# Patient Record
Sex: MALE | Race: WHITE | NOT HISPANIC OR LATINO | Employment: FULL TIME | URBAN - METROPOLITAN AREA
[De-identification: names, ages, dates, MRNs, and addresses within clinical notes are randomized per-mention and may not be internally consistent; named-entity substitution may affect disease eponyms.]

---

## 2017-11-08 ENCOUNTER — OFFICE VISIT (OUTPATIENT)
Dept: URGENT CARE | Facility: CLINIC | Age: 37
End: 2017-11-08
Payer: COMMERCIAL

## 2017-11-08 VITALS
TEMPERATURE: 98 F | OXYGEN SATURATION: 98 % | WEIGHT: 300 LBS | HEIGHT: 72 IN | BODY MASS INDEX: 40.63 KG/M2 | HEART RATE: 109 BPM | DIASTOLIC BLOOD PRESSURE: 80 MMHG | SYSTOLIC BLOOD PRESSURE: 136 MMHG

## 2017-11-08 DIAGNOSIS — K52.9 GASTROENTERITIS: Primary | ICD-10-CM

## 2017-11-08 DIAGNOSIS — B34.9 VIRAL SYNDROME: ICD-10-CM

## 2017-11-08 PROCEDURE — 99213 OFFICE O/P EST LOW 20 MIN: CPT | Mod: 25,S$GLB,, | Performed by: NURSE PRACTITIONER

## 2017-11-08 PROCEDURE — 96372 THER/PROPH/DIAG INJ SC/IM: CPT | Mod: S$GLB,,, | Performed by: EMERGENCY MEDICINE

## 2017-11-08 RX ORDER — DICYCLOMINE HYDROCHLORIDE 20 MG/1
20 TABLET ORAL
Status: COMPLETED | OUTPATIENT
Start: 2017-11-08 | End: 2017-11-08

## 2017-11-08 RX ORDER — INSULIN LISPRO 100 [IU]/ML
INJECTION, SOLUTION INTRAVENOUS; SUBCUTANEOUS
COMMUNITY
End: 2017-11-09

## 2017-11-08 RX ORDER — ONDANSETRON 2 MG/ML
8 INJECTION INTRAMUSCULAR; INTRAVENOUS
Status: COMPLETED | OUTPATIENT
Start: 2017-11-08 | End: 2017-11-08

## 2017-11-08 RX ORDER — ONDANSETRON 8 MG/1
8 TABLET, ORALLY DISINTEGRATING ORAL
Status: DISCONTINUED | OUTPATIENT
Start: 2017-11-08 | End: 2017-11-08

## 2017-11-08 RX ORDER — ONDANSETRON 4 MG/1
4 TABLET, ORALLY DISINTEGRATING ORAL EVERY 6 HOURS PRN
Qty: 12 TABLET | Refills: 0 | Status: SHIPPED | OUTPATIENT
Start: 2017-11-08 | End: 2017-11-11

## 2017-11-08 RX ORDER — DICYCLOMINE HYDROCHLORIDE 20 MG/1
20 TABLET ORAL 3 TIMES DAILY PRN
Qty: 12 TABLET | Refills: 0 | Status: SHIPPED | OUTPATIENT
Start: 2017-11-08 | End: 2018-09-29

## 2017-11-08 RX ADMIN — DICYCLOMINE HYDROCHLORIDE 20 MG: 20 TABLET ORAL at 08:11

## 2017-11-08 RX ADMIN — ONDANSETRON 8 MG: 2 INJECTION INTRAMUSCULAR; INTRAVENOUS at 08:11

## 2017-11-09 ENCOUNTER — HOSPITAL ENCOUNTER (EMERGENCY)
Facility: HOSPITAL | Age: 37
Discharge: HOME | End: 2017-11-09
Attending: EMERGENCY MEDICINE
Payer: COMMERCIAL

## 2017-11-09 VITALS
OXYGEN SATURATION: 99 % | TEMPERATURE: 98 F | SYSTOLIC BLOOD PRESSURE: 134 MMHG | HEART RATE: 72 BPM | DIASTOLIC BLOOD PRESSURE: 73 MMHG | BODY MASS INDEX: 40.63 KG/M2 | HEIGHT: 72 IN | RESPIRATION RATE: 18 BRPM | WEIGHT: 300 LBS

## 2017-11-09 DIAGNOSIS — Z79.4 ENCOUNTER FOR LONG-TERM (CURRENT) USE OF INSULIN: ICD-10-CM

## 2017-11-09 DIAGNOSIS — Z96.41 INSULIN PUMP STATUS: ICD-10-CM

## 2017-11-09 DIAGNOSIS — R10.13 EPIGASTRIC PAIN: Primary | ICD-10-CM

## 2017-11-09 DIAGNOSIS — E10.8 TYPE I DIABETES MELLITUS WITH MANIFESTATIONS: ICD-10-CM

## 2017-11-09 LAB
ALBUMIN SERPL BCP-MCNC: 3.3 G/DL
ALLENS TEST: ABNORMAL
ALP SERPL-CCNC: 103 U/L
ALT SERPL W/O P-5'-P-CCNC: 12 U/L
ANION GAP SERPL CALC-SCNC: 12 MMOL/L
AST SERPL-CCNC: 14 U/L
BACTERIA #/AREA URNS AUTO: ABNORMAL /HPF
BASOPHILS # BLD AUTO: 0.03 K/UL
BASOPHILS NFR BLD: 0.3 %
BILIRUB SERPL-MCNC: 0.8 MG/DL
BILIRUB UR QL STRIP: NEGATIVE
BUN SERPL-MCNC: 27 MG/DL
CALCIUM SERPL-MCNC: 9.2 MG/DL
CHLORIDE SERPL-SCNC: 98 MMOL/L
CLARITY UR REFRACT.AUTO: CLEAR
CO2 SERPL-SCNC: 21 MMOL/L
COLOR UR AUTO: YELLOW
CREAT SERPL-MCNC: 1.2 MG/DL
DIFFERENTIAL METHOD: ABNORMAL
EOSINOPHIL # BLD AUTO: 0 K/UL
EOSINOPHIL NFR BLD: 0.4 %
ERYTHROCYTE [DISTWIDTH] IN BLOOD BY AUTOMATED COUNT: 12.8 %
EST. GFR  (AFRICAN AMERICAN): >60 ML/MIN/1.73 M^2
EST. GFR  (NON AFRICAN AMERICAN): >60 ML/MIN/1.73 M^2
GLUCOSE SERPL-MCNC: 286 MG/DL
GLUCOSE UR QL STRIP: ABNORMAL
HCO3 UR-SCNC: 21.9 MMOL/L (ref 24–28)
HCT VFR BLD AUTO: 45.6 %
HGB BLD-MCNC: 15.5 G/DL
HGB UR QL STRIP: NEGATIVE
HYALINE CASTS UR QL AUTO: 2 /LPF
IMM GRANULOCYTES # BLD AUTO: 0.04 K/UL
IMM GRANULOCYTES NFR BLD AUTO: 0.4 %
KETONES UR QL STRIP: ABNORMAL
LEUKOCYTE ESTERASE UR QL STRIP: NEGATIVE
LIPASE SERPL-CCNC: 5 U/L
LYMPHOCYTES # BLD AUTO: 0.9 K/UL
LYMPHOCYTES NFR BLD: 8.4 %
MCH RBC QN AUTO: 26.7 PG
MCHC RBC AUTO-ENTMCNC: 34 G/DL
MCV RBC AUTO: 79 FL
MICROSCOPIC COMMENT: ABNORMAL
MONOCYTES # BLD AUTO: 1 K/UL
MONOCYTES NFR BLD: 9.2 %
NEUTROPHILS # BLD AUTO: 8.6 K/UL
NEUTROPHILS NFR BLD: 81.3 %
NITRITE UR QL STRIP: NEGATIVE
NRBC BLD-RTO: 0 /100 WBC
PCO2 BLDA: 39 MMHG (ref 35–45)
PH SMN: 7.36 [PH] (ref 7.35–7.45)
PH UR STRIP: 6 [PH] (ref 5–8)
PLATELET # BLD AUTO: 295 K/UL
PMV BLD AUTO: 8.9 FL
PO2 BLDA: 21 MMHG (ref 40–60)
POC BE: -4 MMOL/L
POC SATURATED O2: 32 % (ref 95–100)
POC TCO2: 23 MMOL/L (ref 24–29)
POCT GLUCOSE: 231 MG/DL (ref 70–110)
POCT GLUCOSE: 274 MG/DL (ref 70–110)
POTASSIUM SERPL-SCNC: 4.1 MMOL/L
PROT SERPL-MCNC: 7.2 G/DL
PROT UR QL STRIP: NEGATIVE
RBC # BLD AUTO: 5.81 M/UL
RBC #/AREA URNS AUTO: 0 /HPF (ref 0–4)
SAMPLE: ABNORMAL
SITE: ABNORMAL
SODIUM SERPL-SCNC: 131 MMOL/L
SP GR UR STRIP: 1.03 (ref 1–1.03)
SQUAMOUS #/AREA URNS AUTO: 1 /HPF
URN SPEC COLLECT METH UR: ABNORMAL
UROBILINOGEN UR STRIP-ACNC: NEGATIVE EU/DL
WBC # BLD AUTO: 10.59 K/UL
WBC #/AREA URNS AUTO: 1 /HPF (ref 0–5)
YEAST UR QL AUTO: ABNORMAL

## 2017-11-09 PROCEDURE — 99284 EMERGENCY DEPT VISIT MOD MDM: CPT | Mod: ,,,

## 2017-11-09 PROCEDURE — 80053 COMPREHEN METABOLIC PANEL: CPT

## 2017-11-09 PROCEDURE — 99284 EMERGENCY DEPT VISIT MOD MDM: CPT | Mod: 25

## 2017-11-09 PROCEDURE — 63600175 PHARM REV CODE 636 W HCPCS

## 2017-11-09 PROCEDURE — 25000003 PHARM REV CODE 250

## 2017-11-09 PROCEDURE — 82803 BLOOD GASES ANY COMBINATION: CPT

## 2017-11-09 PROCEDURE — 85025 COMPLETE CBC W/AUTO DIFF WBC: CPT

## 2017-11-09 PROCEDURE — 83690 ASSAY OF LIPASE: CPT

## 2017-11-09 PROCEDURE — 96361 HYDRATE IV INFUSION ADD-ON: CPT

## 2017-11-09 PROCEDURE — 96375 TX/PRO/DX INJ NEW DRUG ADDON: CPT

## 2017-11-09 PROCEDURE — 96372 THER/PROPH/DIAG INJ SC/IM: CPT

## 2017-11-09 PROCEDURE — S0028 INJECTION, FAMOTIDINE, 20 MG: HCPCS

## 2017-11-09 PROCEDURE — 82962 GLUCOSE BLOOD TEST: CPT

## 2017-11-09 PROCEDURE — 96374 THER/PROPH/DIAG INJ IV PUSH: CPT

## 2017-11-09 PROCEDURE — 81001 URINALYSIS AUTO W/SCOPE: CPT

## 2017-11-09 RX ORDER — PROMETHAZINE HYDROCHLORIDE 25 MG/1
25 SUPPOSITORY RECTAL EVERY 6 HOURS PRN
Qty: 12 SUPPOSITORY | Refills: 0 | Status: ON HOLD | OUTPATIENT
Start: 2017-11-09 | End: 2018-10-01

## 2017-11-09 RX ORDER — DICYCLOMINE HYDROCHLORIDE 10 MG/ML
20 INJECTION INTRAMUSCULAR
Status: COMPLETED | OUTPATIENT
Start: 2017-11-09 | End: 2017-11-09

## 2017-11-09 RX ORDER — FAMOTIDINE 10 MG/ML
20 INJECTION INTRAVENOUS
Status: COMPLETED | OUTPATIENT
Start: 2017-11-09 | End: 2017-11-09

## 2017-11-09 RX ORDER — PANTOPRAZOLE SODIUM 20 MG/1
20 TABLET, DELAYED RELEASE ORAL DAILY
Qty: 30 TABLET | Refills: 0 | OUTPATIENT
Start: 2017-11-09 | End: 2018-09-29

## 2017-11-09 RX ORDER — ONDANSETRON 2 MG/ML
4 INJECTION INTRAMUSCULAR; INTRAVENOUS
Status: COMPLETED | OUTPATIENT
Start: 2017-11-09 | End: 2017-11-09

## 2017-11-09 RX ADMIN — DICYCLOMINE HYDROCHLORIDE 20 MG: 20 INJECTION, SOLUTION INTRAMUSCULAR at 08:11

## 2017-11-09 RX ADMIN — SODIUM CHLORIDE 1000 ML: 0.9 INJECTION, SOLUTION INTRAVENOUS at 09:11

## 2017-11-09 RX ADMIN — FAMOTIDINE 20 MG: 10 INJECTION, SOLUTION INTRAVENOUS at 08:11

## 2017-11-09 RX ADMIN — ONDANSETRON 4 MG: 2 INJECTION INTRAMUSCULAR; INTRAVENOUS at 08:11

## 2017-11-09 RX ADMIN — ALUMINUM HYDROXIDE, MAGNESIUM HYDROXIDE, AND SIMETHICONE 50 ML: 200; 200; 20 SUSPENSION ORAL at 09:11

## 2017-11-09 NOTE — ED PROVIDER NOTES
Encounter Date: 11/9/2017       History     Chief Complaint   Patient presents with    Emesis     stomach bug since monday, nvd, all went around my house, diabetic and still vomiting inspite of zofran , on insuline pump     37 y.o. male with medical history of DM type 1 on insulin pump presents to ED with abdominal pain. Patient endorses nausea, vomiting, subjective fever. Symptoms have been present since Monday. Patient states his entire household has similar symptoms. Patient denies hematemesis, blood in stool. Patient seen at [facility] last night and prescribed zofran with minimal relief. Patient has had minimal solid and liquid intake since Monday and reports glucose readings have been elevated. Also reports history of gastric ulcers in the past that feel similar. Patient denies chills, chest pain, shortness of breath, changes in urination, weakness, syncope.          Review of patient's allergies indicates:   Allergen Reactions    Codeine Nausea And Vomiting     Past Medical History:   Diagnosis Date    Diabetes mellitus type I      History reviewed. No pertinent surgical history.  History reviewed. No pertinent family history.  Social History   Substance Use Topics    Smoking status: Never Smoker    Smokeless tobacco: Never Used    Alcohol use Yes      Comment: occas, none recently     Review of Systems   Constitutional: Positive for fatigue. Negative for chills, diaphoresis and fever (subjective).   HENT: Negative for congestion and nosebleeds.    Eyes: Negative for visual disturbance.   Respiratory: Negative for cough and shortness of breath.    Cardiovascular: Negative for chest pain and leg swelling.   Gastrointestinal: Positive for abdominal pain, diarrhea, nausea and vomiting. Negative for abdominal distention and blood in stool.   Genitourinary: Negative for dysuria and hematuria.   Musculoskeletal: Negative for back pain and neck pain.   Skin: Negative for rash.   Neurological: Negative  for syncope, weakness, light-headedness and headaches.   Psychiatric/Behavioral: The patient is not nervous/anxious.        Physical Exam     Initial Vitals [11/09/17 0749]   BP Pulse Resp Temp SpO2   (!) 142/74 93 18 98.2 °F (36.8 °C) 97 %      MAP       96.67         Physical Exam    Vitals reviewed.  Constitutional: He appears well-developed and well-nourished. He is not diaphoretic. No distress.   HENT:   Head: Normocephalic and atraumatic.   Nose: Nose normal.   Mouth/Throat: Oropharynx is clear and moist. No oropharyngeal exudate.   Eyes: Conjunctivae and EOM are normal. Pupils are equal, round, and reactive to light.   Neck: Normal range of motion. Neck supple. No thyromegaly present.   Cardiovascular: Normal rate, regular rhythm and intact distal pulses.   Pulmonary/Chest: Breath sounds normal. No respiratory distress. He has no wheezes. He exhibits no tenderness.   Abdominal: Soft. He exhibits no distension. Bowel sounds are increased. There is tenderness in the epigastric area. There is guarding. There is no rigidity, no rebound, no CVA tenderness, no tenderness at McBurney's point and negative Solis's sign.   Musculoskeletal: Normal range of motion.   Lymphadenopathy:     He has no cervical adenopathy.   Neurological: He is alert and oriented to person, place, and time. He has normal strength. No cranial nerve deficit or sensory deficit.   Skin: Skin is warm and dry. Capillary refill takes less than 2 seconds. No rash noted.   Psychiatric: He has a normal mood and affect.         ED Course   Procedures  Labs Reviewed   CBC W/ AUTO DIFFERENTIAL - Abnormal; Notable for the following:        Result Value    MCV 79 (*)     MCH 26.7 (*)     MPV 8.9 (*)     Gran # 8.6 (*)     Lymph # 0.9 (*)     Gran% 81.3 (*)     Lymph% 8.4 (*)     All other components within normal limits   COMPREHENSIVE METABOLIC PANEL - Abnormal; Notable for the following:     Sodium 131 (*)     CO2 21 (*)     Glucose 286 (*)     BUN, Bld  27 (*)     Albumin 3.3 (*)     All other components within normal limits   URINALYSIS, REFLEX TO URINE CULTURE - Abnormal; Notable for the following:     Glucose, UA 3+ (*)     Ketones, UA 3+ (*)     All other components within normal limits    Narrative:     Preferred Collection Type->Urine, Clean Catch   URINALYSIS MICROSCOPIC - Abnormal; Notable for the following:     Hyaline Casts, UA 2 (*)     All other components within normal limits    Narrative:     Preferred Collection Type->Urine, Clean Catch   POCT GLUCOSE - Abnormal; Notable for the following:     POCT Glucose 274 (*)     All other components within normal limits   ISTAT PROCEDURE - Abnormal; Notable for the following:     POC PO2 21 (*)     POC HCO3 21.9 (*)     POC SATURATED O2 32 (*)     POC TCO2 23 (*)     All other components within normal limits   POCT GLUCOSE - Abnormal; Notable for the following:     POCT Glucose 231 (*)     All other components within normal limits   LIPASE             Medical Decision Making:   History:   Old Medical Records: I decided to obtain old medical records.  Old Records Summarized: records from clinic visits.  Clinical Tests:   Lab Tests: Ordered and Reviewed       APC / Resident Notes:   37 y.o. male with medical history of DM type 1 on insulin pump presents to ED with abdominal pain.     DDX includes but is not limited to gastroenteritis, pancreatitis, hyperglycemia, dehydration, GERMAN, electrolyte abnormality. Will get labs and give IVF, IV zofran 4mg, IV pepcid 20mg, IM bentyl 20mg and reassess. Patient reports minimal relief after pepcid. Patient tender in epigastric region, no RUQ or RLQ tenderness. BUN 27, sodium 131, glucose 286. All other labs benign. After IVF and GI cocktail, patient states pain is much improved. POCT 231.patient tolerated po challenge. Most likely gastroenteritis, considered gastric ulcer.  Discharged to home in stable condition, return to ED warnings given, follow up and patient care  instructions given.  Will discharge home with phenergan suppository and protonix 20mg with GI follow up.    I have discussed and reviewed with my supervising physician.              ED Course      Clinical Impression:   The encounter diagnosis was Epigastric pain.    Disposition:   Disposition: Discharged  Condition: Stable                        Shae Patterson PA-C  11/09/17 1442

## 2017-11-09 NOTE — ED TRIAGE NOTES
Patient states vomiting diarrhea since Monday, Today emesis x1, Went to Urgent Care yesterday, shot and rx Zofran that has not helped. On insulin pump with basal rate.

## 2017-11-09 NOTE — ED NOTES
Patient identifiers verified and correct for Mr Waters  C/C: Vomiting  APPEARANCE: awake and alert in NAD.  SKIN: warm, dry and intact. No breakdown or bruising.Right flank insulin pump  MUSCULOSKELETAL: Patient moving all extremities spontaneously, no obvious swelling or deformities noted. Ambulates independently.  RESPIRATORY: Denies shortness of breath.Respirations unlabored.   CARDIAC: Denies CP, 2+ distal pulses; no peripheral edema  ABDOMEN:Abdomen soft tender upper abdomen, positive vomiting and diarrhea.   : voids spontaneously, denies difficulty  Neurologic: AAO x 4; follows commands equal strength in all extremities; denies numbness/tingling. Denies dizziness

## 2017-11-09 NOTE — PROGRESS NOTES
Subjective:       Patient ID: Clay Waters is a 37 y.o. male.    Vitals:  height is 6' (1.829 m) and weight is 136.1 kg (300 lb). His oral temperature is 97.9 °F (36.6 °C). His blood pressure is 136/80 and his pulse is 109. His oxygen saturation is 98%.     Chief Complaint: Vomiting    Emesis    This is a new problem. The current episode started in the past 7 days. The problem occurs 2 to 4 times per day. The problem has been waxing and waning. The emesis has an appearance of stomach contents and bile. There has been no fever. The fever has been present for 1 to 2 days. Associated symptoms include diarrhea and headaches. Pertinent negatives include no abdominal pain, chest pain, chills, coughing, dizziness, fever or myalgias. Risk factors include ill contacts (family member with same symptoms). He has tried increased fluids, sleep and acetaminophen for the symptoms. The treatment provided mild relief.     Review of Systems   Constitution: Positive for malaise/fatigue. Negative for chills and fever.   HENT: Negative for congestion, ear pain, hoarse voice and sore throat.    Eyes: Negative for discharge and redness.   Cardiovascular: Negative for chest pain, dyspnea on exertion and leg swelling.   Respiratory: Negative for cough, shortness of breath, sputum production and wheezing.    Musculoskeletal: Negative for myalgias.   Gastrointestinal: Positive for diarrhea, nausea and vomiting. Negative for abdominal pain.   Neurological: Positive for headaches. Negative for dizziness.       Objective:      Physical Exam   Constitutional: He is oriented to person, place, and time. He appears well-developed and well-nourished.   HENT:   Head: Normocephalic and atraumatic.   Right Ear: Hearing, tympanic membrane, external ear and ear canal normal. Tympanic membrane is not erythematous.   Left Ear: Hearing, tympanic membrane, external ear and ear canal normal. Tympanic membrane is not erythematous.   Nose: Nose normal.  No mucosal edema or rhinorrhea. Right sinus exhibits no maxillary sinus tenderness and no frontal sinus tenderness. Left sinus exhibits no maxillary sinus tenderness and no frontal sinus tenderness.   Mouth/Throat: Uvula is midline, oropharynx is clear and moist and mucous membranes are normal. No posterior oropharyngeal edema or posterior oropharyngeal erythema.   Eyes: Conjunctivae, EOM and lids are normal. Right conjunctiva is not injected. Left conjunctiva is not injected.   Neck: Normal range of motion and full passive range of motion without pain. Neck supple.   Cardiovascular: Regular rhythm, S1 normal, S2 normal, normal heart sounds and normal pulses.  Tachycardia present.    Pulmonary/Chest: Effort normal and breath sounds normal. No respiratory distress. He has no decreased breath sounds. He has no wheezes.   Abdominal: Soft. Normal appearance. Bowel sounds are increased. There is no tenderness.   Neurological: He is alert and oriented to person, place, and time.   Skin: Skin is warm and dry.   Nursing note and vitals reviewed.      Assessment:       1. Gastroenteritis    2. Viral syndrome        Plan:         Gastroenteritis    Viral syndrome    Other orders  -     dicyclomine tablet 20 mg; Take 1 tablet (20 mg total) by mouth one time.  -     Discontinue: ondansetron disintegrating tablet 8 mg; Take 1 tablet (8 mg total) by mouth one time.  -     ondansetron (ZOFRAN-ODT) 4 MG TbDL; Take 1 tablet (4 mg total) by mouth every 6 (six) hours as needed (nausea vomiting).  Dispense: 12 tablet; Refill: 0  -     dicyclomine (BENTYL) 20 mg tablet; Take 1 tablet (20 mg total) by mouth 3 (three) times daily as needed.  Dispense: 12 tablet; Refill: 0  -     ondansetron injection 8 mg; Inject 8 mg into the vein one time.        accucheck unavailable in clinic, patient instructed to go to ER if >400 or unable to tolerate fluids

## 2017-11-09 NOTE — PATIENT INSTRUCTIONS
Please arrange follow up with your primary medical clinic as soon as possible. You must understand that you've received an Urgent Care treatment only and that you may be released before all of your medical problems are known or treated. You, the patient, will arrange for follow up as instructed. If your symptoms worsen or fail to improve you should go to the Emergency Room.      Noninfectious Gastroenteritis (Ages 6 Years to Adult)    Gastroenteritis can cause nausea, vomiting, diarrhea, and abdominal cramping. This may occur as a result of food sensitivity, inflammation of your gastrointestinal tract, medicines, stress, or other causes not related to infection. Your symptoms will usually last from 1 to 3 days, but can last longer. Antibiotics are not effective, but simple home treatment will be helpful.  Home care  Medicine  · You may use acetaminophen or NSAID medicines like ibuprofen or naproxen to control fever, unless another medicine is prescribed. (Note: If you have chronic liver or kidney disease, or ever had a stomach ulcer or gastrointestinalI bleeding, talk with your healthcare provider before using these medicines.) Aspirin should never be used in anyone under 18 years of age who is ill with a fever. It may cause severe liver damage. Don't increase your NSAID medicines if you are already taking these medicines for another condition (like arthritis). Don't use NSAIDS if you are on aspirin (such as for heart disease, or after a stroke).  · If medicines for diarrhea or vomiting are prescribed, take only as directed.  General care and preventing spread of the illness  · If symptoms are severe, rest at home for the next 24 hours or until you feel better.  · Hand washing with soap and water is the best way to prevent the spread of infection. Wash your hands after touching anyone who is sick.  · Wash your hands after using the toilet and before meals. Clean the toilet after each use.  · Caffeine, tobacco, and  alcohol can make your diarrhea, cramping, and pain worse.  Diet  · Water and clear liquids are important so you do not get dehydrated. Drink a small amount at a time.  · Do not force yourself to eat, especially if you have cramps, vomiting, or diarrhea. When you finally decide to start eating, do not eat large amounts at a time, even if you are hungry.  · If you eat, avoid fatty, greasy, spicy, or fried foods.  · Do not eat dairy products if you have diarrhea; they can make the diarrhea worse.  During the first 24 hours (the first full day), follow the diet below:  · Beverages: Water, clear liquids, soft drinks without caffeine, like ginger ale; mineral water (plain or flavored); decaffeinated tea and coffee.  · Soups: Clear broth, consommé, and bouillon Sports drinks aren't a good choice because they have too much sugar and not enough electrolytes. In this case, commercially available products called oral rehydration solutions are best.  · Desserts: Plain gelatin, popsicles, and fruit juice bars.  During the next 24 hours (the second day), you may add the following to the above if you have improved. If not, continue what you did the first day:  · Hot cereal, plain toast, bread, rolls, crackers  · Plain noodles, rice, mashed potatoes, chicken noodle or rice soup  · Unsweetened canned fruit (avoid pineapple), bananas  · Limit caffeine and chocolate. No spices or seasonings except salt.  During the next 24 hours  · Gradually resume a normal diet, as you feel better and your symptoms improve.  · If at any time your symptoms start getting worse, go back to clear liquids until you feel better.  Food preparation  · If you have diarrhea, you should not prepare food for others. When you  prepare food for yourself, wash your hands before and after.  · Wash your hands after using cutting boards, countertops, and knives that have been in contact with raw food.  · Keep uncooked meats away from cooked and ready-to-eat  foods.  Follow-up care  Follow up with your healthcare provider if you are not improving over the next 2 to 3 days, or as advised. If a stool (diarrhea) sample was taken, call for the results as directed.  When to seek medical care  Call your healthcare provider right away if any of these occur:   · Increasing abdominal pain or constant lower right abdominal pain  · Continued vomiting (unable to keep liquids down)  · Frequent diarrhea (more than 5 times a day)  · Blood in vomit or stool (black or red color)  · Inability to tolerate solid food after a few days.  · Dark urine, reduced urine output  · Weakness, dizziness  · Drowsiness  · Fever of 100.4ºF (38.0ºC) or higher, or as directed by your healthcare provider  · New rash  Call 911  Call 911 if any of these occur:  · Trouble breathing  · Chest pain  · Confusion  · Severe drowsiness or trouble awakening  · Seizure  · Stiff neck  Date Last Reviewed: 11/16/2015  © 4926-8294 [employer]. [address] [place] [address], [place] [address]. All rights reserved. This information is not intended as a substitute for professional medical care. Always follow your healthcare professional's instructions.

## 2018-09-29 PROCEDURE — 0H94XZZ DRAINAGE OF NECK SKIN, EXTERNAL APPROACH: ICD-10-PCS | Performed by: EMERGENCY MEDICINE

## 2018-09-30 ENCOUNTER — HOSPITAL ENCOUNTER (INPATIENT)
Facility: HOSPITAL | Age: 38
LOS: 2 days | Discharge: HOME | DRG: 603 | End: 2018-10-03
Attending: EMERGENCY MEDICINE | Admitting: HOSPITALIST
Payer: COMMERCIAL

## 2018-09-30 DIAGNOSIS — E10.65 TYPE 1 DIABETES MELLITUS WITH HYPERGLYCEMIA: ICD-10-CM

## 2018-09-30 DIAGNOSIS — L03.221 CELLULITIS OF NECK: Primary | ICD-10-CM

## 2018-09-30 DIAGNOSIS — I10 ESSENTIAL HYPERTENSION: ICD-10-CM

## 2018-09-30 DIAGNOSIS — Z96.41 INSULIN PUMP IN PLACE: ICD-10-CM

## 2018-09-30 DIAGNOSIS — L03.90 CELLULITIS: ICD-10-CM

## 2018-09-30 DIAGNOSIS — R91.8 LUNG NODULE, MULTIPLE: ICD-10-CM

## 2018-09-30 LAB
ALLENS TEST: ABNORMAL
ANION GAP SERPL CALC-SCNC: 15 MMOL/L
B-OH-BUTYR BLD STRIP-SCNC: 1.4 MMOL/L
BASOPHILS # BLD AUTO: 0.12 K/UL
BASOPHILS NFR BLD: 1.1 %
BUN SERPL-MCNC: 20 MG/DL
CALCIUM SERPL-MCNC: 9.3 MG/DL
CHLORIDE SERPL-SCNC: 100 MMOL/L
CO2 SERPL-SCNC: 17 MMOL/L
CREAT SERPL-MCNC: 1.2 MG/DL
DELSYS: ABNORMAL
DIFFERENTIAL METHOD: ABNORMAL
EOSINOPHIL # BLD AUTO: 0.3 K/UL
EOSINOPHIL NFR BLD: 2.6 %
ERYTHROCYTE [DISTWIDTH] IN BLOOD BY AUTOMATED COUNT: 11.9 %
EST. GFR  (AFRICAN AMERICAN): >60 ML/MIN/1.73 M^2
EST. GFR  (NON AFRICAN AMERICAN): >60 ML/MIN/1.73 M^2
GLUCOSE SERPL-MCNC: 571 MG/DL
HCO3 UR-SCNC: 25 MMOL/L (ref 24–28)
HCT VFR BLD AUTO: 45.5 %
HGB BLD-MCNC: 14.9 G/DL
IMM GRANULOCYTES # BLD AUTO: 0.04 K/UL
IMM GRANULOCYTES NFR BLD AUTO: 0.4 %
INR PPP: 1
LDH SERPL L TO P-CCNC: 0.81 MMOL/L (ref 0.5–2.2)
LYMPHOCYTES # BLD AUTO: 1.9 K/UL
LYMPHOCYTES NFR BLD: 17.2 %
MCH RBC QN AUTO: 26.9 PG
MCHC RBC AUTO-ENTMCNC: 32.7 G/DL
MCV RBC AUTO: 82 FL
MODE: ABNORMAL
MONOCYTES # BLD AUTO: 0.9 K/UL
MONOCYTES NFR BLD: 8.1 %
NEUTROPHILS # BLD AUTO: 7.7 K/UL
NEUTROPHILS NFR BLD: 70.6 %
NRBC BLD-RTO: 0 /100 WBC
PCO2 BLDA: 42.3 MMHG (ref 35–45)
PH SMN: 7.38 [PH] (ref 7.35–7.45)
PLATELET # BLD AUTO: 295 K/UL
PMV BLD AUTO: 9.4 FL
PO2 BLDA: 34 MMHG (ref 40–60)
POC BE: 0 MMOL/L
POC SATURATED O2: 64 % (ref 95–100)
POC TCO2: 26 MMOL/L (ref 24–29)
POCT GLUCOSE: 482 MG/DL (ref 70–110)
POCT GLUCOSE: 493 MG/DL (ref 70–110)
POTASSIUM SERPL-SCNC: 4.4 MMOL/L
PROTHROMBIN TIME: 10.2 SEC
RBC # BLD AUTO: 5.54 M/UL
SAMPLE: ABNORMAL
SITE: ABNORMAL
SODIUM SERPL-SCNC: 132 MMOL/L
WBC # BLD AUTO: 10.91 K/UL

## 2018-09-30 PROCEDURE — 96365 THER/PROPH/DIAG IV INF INIT: CPT

## 2018-09-30 PROCEDURE — 85610 PROTHROMBIN TIME: CPT

## 2018-09-30 PROCEDURE — S0077 INJECTION, CLINDAMYCIN PHOSP: HCPCS | Performed by: EMERGENCY MEDICINE

## 2018-09-30 PROCEDURE — 99285 EMERGENCY DEPT VISIT HI MDM: CPT | Mod: 25

## 2018-09-30 PROCEDURE — 99284 EMERGENCY DEPT VISIT MOD MDM: CPT | Mod: ,,, | Performed by: EMERGENCY MEDICINE

## 2018-09-30 PROCEDURE — 99900035 HC TECH TIME PER 15 MIN (STAT)

## 2018-09-30 PROCEDURE — 96375 TX/PRO/DX INJ NEW DRUG ADDON: CPT

## 2018-09-30 PROCEDURE — 82803 BLOOD GASES ANY COMBINATION: CPT

## 2018-09-30 PROCEDURE — 87040 BLOOD CULTURE FOR BACTERIA: CPT

## 2018-09-30 PROCEDURE — 80048 BASIC METABOLIC PNL TOTAL CA: CPT

## 2018-09-30 PROCEDURE — G0378 HOSPITAL OBSERVATION PER HR: HCPCS

## 2018-09-30 PROCEDURE — 25500020 PHARM REV CODE 255: Performed by: EMERGENCY MEDICINE

## 2018-09-30 PROCEDURE — 96376 TX/PRO/DX INJ SAME DRUG ADON: CPT

## 2018-09-30 PROCEDURE — 83036 HEMOGLOBIN GLYCOSYLATED A1C: CPT

## 2018-09-30 PROCEDURE — 25000003 PHARM REV CODE 250: Performed by: EMERGENCY MEDICINE

## 2018-09-30 PROCEDURE — 83605 ASSAY OF LACTIC ACID: CPT

## 2018-09-30 PROCEDURE — 96361 HYDRATE IV INFUSION ADD-ON: CPT

## 2018-09-30 PROCEDURE — 63600175 PHARM REV CODE 636 W HCPCS: Performed by: EMERGENCY MEDICINE

## 2018-09-30 PROCEDURE — 85025 COMPLETE CBC W/AUTO DIFF WBC: CPT

## 2018-09-30 PROCEDURE — 82962 GLUCOSE BLOOD TEST: CPT

## 2018-09-30 PROCEDURE — 82010 KETONE BODYS QUAN: CPT

## 2018-09-30 RX ORDER — CLINDAMYCIN PHOSPHATE 900 MG/50ML
900 INJECTION, SOLUTION INTRAVENOUS
Status: COMPLETED | OUTPATIENT
Start: 2018-09-30 | End: 2018-09-30

## 2018-09-30 RX ADMIN — SODIUM CHLORIDE 1000 ML: 0.9 INJECTION, SOLUTION INTRAVENOUS at 10:09

## 2018-09-30 RX ADMIN — INSULIN HUMAN 10 UNITS: 100 INJECTION, SOLUTION PARENTERAL at 09:09

## 2018-09-30 RX ADMIN — SODIUM CHLORIDE 1000 ML: 0.9 INJECTION, SOLUTION INTRAVENOUS at 09:09

## 2018-09-30 RX ADMIN — INSULIN HUMAN 10 UNITS: 100 INJECTION, SOLUTION PARENTERAL at 10:09

## 2018-09-30 RX ADMIN — CLINDAMYCIN IN 5 PERCENT DEXTROSE 900 MG: 18 INJECTION, SOLUTION INTRAVENOUS at 09:09

## 2018-09-30 RX ADMIN — Medication 2 ML: at 08:09

## 2018-09-30 RX ADMIN — IOHEXOL 100 ML: 350 INJECTION, SOLUTION INTRAVENOUS at 10:09

## 2018-10-01 PROBLEM — L03.221 CELLULITIS OF NECK: Status: ACTIVE | Noted: 2018-09-30

## 2018-10-01 PROBLEM — E66.813 CLASS 3 SEVERE OBESITY IN ADULT: Status: ACTIVE | Noted: 2018-10-01

## 2018-10-01 PROBLEM — E66.01 CLASS 3 SEVERE OBESITY IN ADULT: Status: ACTIVE | Noted: 2018-10-01

## 2018-10-01 PROBLEM — E11.10 METABOLIC ACIDOSIS DUE TO DIABETES MELLITUS: Status: ACTIVE | Noted: 2018-10-01

## 2018-10-01 PROBLEM — E10.65 TYPE 1 DIABETES MELLITUS WITH HYPERGLYCEMIA: Status: ACTIVE | Noted: 2018-10-01

## 2018-10-01 PROBLEM — I10 ESSENTIAL HYPERTENSION: Status: ACTIVE | Noted: 2018-10-01

## 2018-10-01 PROBLEM — E10.9 DIABETES MELLITUS TYPE I: Status: ACTIVE | Noted: 2018-10-01

## 2018-10-01 PROBLEM — Z96.41 INSULIN PUMP IN PLACE: Status: ACTIVE | Noted: 2018-10-01

## 2018-10-01 LAB
ALBUMIN SERPL BCP-MCNC: 3.1 G/DL
ANION GAP SERPL CALC-SCNC: 10 MMOL/L
B-OH-BUTYR BLD STRIP-SCNC: 1.7 MMOL/L
BUN SERPL-MCNC: 17 MG/DL
CALCIUM SERPL-MCNC: 8.5 MG/DL
CHLORIDE SERPL-SCNC: 105 MMOL/L
CO2 SERPL-SCNC: 21 MMOL/L
CREAT SERPL-MCNC: 0.9 MG/DL
EST. GFR  (AFRICAN AMERICAN): >60 ML/MIN/1.73 M^2
EST. GFR  (NON AFRICAN AMERICAN): >60 ML/MIN/1.73 M^2
ESTIMATED AVG GLUCOSE: 252 MG/DL
GLUCOSE SERPL-MCNC: 325 MG/DL
HBA1C MFR BLD HPLC: 10.4 %
MAGNESIUM SERPL-MCNC: 1.9 MG/DL
POCT GLUCOSE: 193 MG/DL (ref 70–110)
POCT GLUCOSE: 253 MG/DL (ref 70–110)
POCT GLUCOSE: 261 MG/DL (ref 70–110)
POCT GLUCOSE: 270 MG/DL (ref 70–110)
POCT GLUCOSE: 413 MG/DL (ref 70–110)
POTASSIUM SERPL-SCNC: 4.4 MMOL/L
SODIUM SERPL-SCNC: 136 MMOL/L

## 2018-10-01 PROCEDURE — 25000003 PHARM REV CODE 250: Performed by: HOSPITALIST

## 2018-10-01 PROCEDURE — 99219 PR INITIAL OBSERVATION CARE,LEVL II: CPT | Mod: ,,, | Performed by: HOSPITALIST

## 2018-10-01 PROCEDURE — 36415 COLL VENOUS BLD VENIPUNCTURE: CPT

## 2018-10-01 PROCEDURE — S0077 INJECTION, CLINDAMYCIN PHOSP: HCPCS | Performed by: HOSPITALIST

## 2018-10-01 PROCEDURE — 83735 ASSAY OF MAGNESIUM: CPT

## 2018-10-01 PROCEDURE — 80048 BASIC METABOLIC PNL TOTAL CA: CPT

## 2018-10-01 PROCEDURE — 82010 KETONE BODYS QUAN: CPT

## 2018-10-01 PROCEDURE — 63600175 PHARM REV CODE 636 W HCPCS: Performed by: HOSPITALIST

## 2018-10-01 PROCEDURE — 11000001 HC ACUTE MED/SURG PRIVATE ROOM

## 2018-10-01 PROCEDURE — 82040 ASSAY OF SERUM ALBUMIN: CPT

## 2018-10-01 PROCEDURE — 99223 1ST HOSP IP/OBS HIGH 75: CPT | Mod: ,,, | Performed by: NURSE PRACTITIONER

## 2018-10-01 RX ORDER — KETOROLAC TROMETHAMINE 30 MG/ML
15 INJECTION, SOLUTION INTRAMUSCULAR; INTRAVENOUS EVERY 6 HOURS PRN
Status: DISCONTINUED | OUTPATIENT
Start: 2018-10-01 | End: 2018-10-03 | Stop reason: HOSPADM

## 2018-10-01 RX ORDER — IBUPROFEN 200 MG
16 TABLET ORAL
Status: DISCONTINUED | OUTPATIENT
Start: 2018-10-01 | End: 2018-10-03 | Stop reason: HOSPADM

## 2018-10-01 RX ORDER — IBUPROFEN 200 MG
16 TABLET ORAL
Status: DISCONTINUED | OUTPATIENT
Start: 2018-10-01 | End: 2018-10-01

## 2018-10-01 RX ORDER — CLINDAMYCIN PHOSPHATE 900 MG/50ML
900 INJECTION, SOLUTION INTRAVENOUS
Status: DISCONTINUED | OUTPATIENT
Start: 2018-10-01 | End: 2018-10-01

## 2018-10-01 RX ORDER — LOSARTAN POTASSIUM 50 MG/1
50 TABLET ORAL DAILY
Status: DISCONTINUED | OUTPATIENT
Start: 2018-10-01 | End: 2018-10-03 | Stop reason: HOSPADM

## 2018-10-01 RX ORDER — CLINDAMYCIN PHOSPHATE 900 MG/50ML
900 INJECTION, SOLUTION INTRAVENOUS
Status: DISCONTINUED | OUTPATIENT
Start: 2018-10-01 | End: 2018-10-03 | Stop reason: HOSPADM

## 2018-10-01 RX ORDER — INSULIN ASPART 100 [IU]/ML
1-10 INJECTION, SOLUTION INTRAVENOUS; SUBCUTANEOUS
Status: DISCONTINUED | OUTPATIENT
Start: 2018-10-01 | End: 2018-10-01

## 2018-10-01 RX ORDER — RAMELTEON 8 MG/1
8 TABLET ORAL NIGHTLY PRN
Status: DISCONTINUED | OUTPATIENT
Start: 2018-10-01 | End: 2018-10-03 | Stop reason: HOSPADM

## 2018-10-01 RX ORDER — IBUPROFEN 200 MG
24 TABLET ORAL
Status: DISCONTINUED | OUTPATIENT
Start: 2018-10-01 | End: 2018-10-03 | Stop reason: HOSPADM

## 2018-10-01 RX ORDER — IBUPROFEN 200 MG
24 TABLET ORAL
Status: DISCONTINUED | OUTPATIENT
Start: 2018-10-01 | End: 2018-10-01

## 2018-10-01 RX ORDER — ACETAMINOPHEN 325 MG/1
650 TABLET ORAL EVERY 4 HOURS PRN
Status: DISCONTINUED | OUTPATIENT
Start: 2018-10-01 | End: 2018-10-03 | Stop reason: HOSPADM

## 2018-10-01 RX ORDER — AMOXICILLIN 250 MG
1 CAPSULE ORAL 2 TIMES DAILY PRN
Status: DISCONTINUED | OUTPATIENT
Start: 2018-10-01 | End: 2018-10-03 | Stop reason: HOSPADM

## 2018-10-01 RX ORDER — ONDANSETRON 4 MG/1
4 TABLET, ORALLY DISINTEGRATING ORAL EVERY 8 HOURS PRN
Status: DISCONTINUED | OUTPATIENT
Start: 2018-10-01 | End: 2018-10-03 | Stop reason: HOSPADM

## 2018-10-01 RX ORDER — GLUCAGON 1 MG
1 KIT INJECTION
Status: DISCONTINUED | OUTPATIENT
Start: 2018-10-01 | End: 2018-10-01

## 2018-10-01 RX ORDER — SODIUM CHLORIDE 0.9 % (FLUSH) 0.9 %
5 SYRINGE (ML) INJECTION
Status: DISCONTINUED | OUTPATIENT
Start: 2018-10-01 | End: 2018-10-03 | Stop reason: HOSPADM

## 2018-10-01 RX ORDER — LOSARTAN POTASSIUM 50 MG/1
50 TABLET ORAL DAILY
COMMUNITY
End: 2021-01-15 | Stop reason: SDUPTHER

## 2018-10-01 RX ORDER — GLUCAGON 1 MG
1 KIT INJECTION
Status: DISCONTINUED | OUTPATIENT
Start: 2018-10-01 | End: 2018-10-03 | Stop reason: HOSPADM

## 2018-10-01 RX ADMIN — KETOROLAC TROMETHAMINE 15 MG: 30 INJECTION, SOLUTION INTRAMUSCULAR; INTRAVENOUS at 08:10

## 2018-10-01 RX ADMIN — CLINDAMYCIN IN 5 PERCENT DEXTROSE 900 MG: 18 INJECTION, SOLUTION INTRAVENOUS at 10:10

## 2018-10-01 RX ADMIN — CLINDAMYCIN IN 5 PERCENT DEXTROSE 900 MG: 18 INJECTION, SOLUTION INTRAVENOUS at 08:10

## 2018-10-01 RX ADMIN — CLINDAMYCIN IN 5 PERCENT DEXTROSE 900 MG: 18 INJECTION, SOLUTION INTRAVENOUS at 02:10

## 2018-10-01 RX ADMIN — LOSARTAN POTASSIUM 50 MG: 50 TABLET ORAL at 09:10

## 2018-10-01 RX ADMIN — INSULIN ASPART 6 UNITS: 100 INJECTION, SOLUTION INTRAVENOUS; SUBCUTANEOUS at 09:10

## 2018-10-01 RX ADMIN — KETOROLAC TROMETHAMINE 15 MG: 30 INJECTION, SOLUTION INTRAMUSCULAR; INTRAVENOUS at 10:10

## 2018-10-01 RX ADMIN — KETOROLAC TROMETHAMINE 15 MG: 30 INJECTION, SOLUTION INTRAMUSCULAR; INTRAVENOUS at 01:10

## 2018-10-01 NOTE — SUBJECTIVE & OBJECTIVE
Interval HPI:   Patient in observation. BG elevated in ED; patient forgot to bolus for meal. Pump stopped in ED. Given regular insulin x2 and novolog for meals this AM.   Eatin%  Nausea: No  Hypoglycemia and intervention: No  Fever: No  TPN and/or TF: No    PMH, PSH, FH, SH reviewed     Review of Systems   Constitutional: Negative for weight changes.  Eyes: Negative for visual disturbance.  Respiratory: Negative for cough.   Cardiovascular: Negative for chest pain.  Gastrointestinal: Negative for nausea.  Endocrine: Negative for polyuria, polydipsia.  Musculoskeletal: Negative for back pain.  Skin: Negative for rash.  Neurological: Negative for syncope.  Psychiatric/Behavioral: Negative for depression.      Current Medications and/or Treatments Impacting Glycemic Control  Immunotherapy:    Immunosuppressants     None        Steroids:   Hormones (From admission, onward)    None        Pressors:    Autonomic Drugs (From admission, onward)    None        Hyperglycemia/Diabetes Medications:   Antihyperglycemics (From admission, onward)    Start     Stop Route Frequency Ordered    10/01/18 0107  insulin aspart U-100 pen 1-10 Units      -- SubQ Before meals & nightly PRN 10/01/18 0007             PHYSICAL EXAMINATION:  Vitals:    10/01/18 0936   BP: 134/78   Pulse: 86   Resp: 18   Temp: 98.5 °F (36.9 °C)     Body mass index is 40.29 kg/m².    Physical Exam   Constitutional:  Well developed, well nourished, NAD.  ENT: External ears no masses with nose patent; normal hearing.   Neck:  Supple; trachea midline.  Cardiovascular: Normal heart sounds, no LE edema.     Lungs:  Normal effort; lungs anterior bilaterally clear to auscultation.  Abdomen:  Soft, no masses,  no hernias.  MS: No clubbing or cyanosis of nails noted; unable to assess gait.  Skin: Redness and swelling to posterior neck.  No rashes, lesions, or ulcers; no nodules.  Psychiatric: Good judgement and insight; normal mood and affect.  Neurological:  Cranial nerves are grossly intact.

## 2018-10-01 NOTE — NURSING
Insulin pump restarted by endocrinology. Basal rate 1.6 at this time and changes will be made by pt.

## 2018-10-01 NOTE — ED NOTES
Dr. Ramirez to speak with medicine regarding orders for admission. Per Dr. Ramirez and charge rn, DH: OK to start insulin infusion in [facility].

## 2018-10-01 NOTE — ASSESSMENT & PLAN NOTE
Patient is alert and oriented and can manage his/her pump.  Infusion set and insertion site to be changed every 48-72hrs or prn  Date of last infusion set and insertion site change was 10/2/18  The insertion is located to the LL abdomen  and is clear, without redness

## 2018-10-01 NOTE — ASSESSMENT & PLAN NOTE
BG goal 110-150 (not critically ill)    Resume home insulin pump with above settings except change target to 110-150.

## 2018-10-01 NOTE — NURSING
Pt to room 3079 from ER via wheel chair with transport. Pt is alert and oriented. Pt has peripheral IV to LAC, pt has insulin pump to LLQ that is currently off. Pt has pain of 10/10 to incision to back of neck, dressing is clean, dry, and intact some swelling noted. Pt does have Toradol will administer. Pt is ambulatory and has ambulated to restroom. Will assess and continue to monitor.

## 2018-10-01 NOTE — ED NOTES
"Pt returned from CT and states he was "feeling a little weird." Pt denies any SOB/CP/chills. Pt requesting water. Dr. Ramirez contacted and states to begin 1000 NS bolus and to hold off on PO intake until CT results. Pt updated on POC.  "

## 2018-10-01 NOTE — HPI
Reason for Consult: Management of type 1 DM, Hyperglycemia     Surgical Procedure and Date: n/a    Diabetes diagnosis year: 7    Home Diabetes Medications:  Novolog in medtronic 751   Basal rates  12a- 5a: 1.6 u/hr  5a-9a: 3.2 u/hr  9a-1300: 2.2 u/hr  2850-2657: 2.3 u/hr  6906-6367: 2 u/hr    ICR   3651-8305: 5  5169-2853: 6.5  1630-000-: 5    ISF 25    Target     IOB 4 hours        How often checking glucose at home? 3 times a day  BG readings on regimen: 120-260  Hypoglycemia on the regimen?  yes weekly or less  Missed doses on regimen?  No    Diabetes Complications include:     Hyperglycemia and Hypoglycemia     Complicating diabetes co morbidities:   infection      HPI:   Patient is a 38 y.o. male with a diagnosis of T1DM (A1C of 10.4, uses insulin pump) and HTN admitted for cellulitis/abscess and DKA. Patient forgot to bolus dose his insulin pump with dinner and his resultant glucose in the ED was >500. In the ED, labs were notable for HCO3 of 17 , AG of 15, BHB of 1.4, with no GERMAN. CT neck showed no drainable abscess. Denies chills, fevers, NV, or systemic symptoms.

## 2018-10-01 NOTE — CONSULTS
[facility]  Endocrinology  Diabetes Consult Note    Consult Requested by: Carmen Hoskins MD   Reason for admit: Cellulitis of neck    HISTORY OF PRESENT ILLNESS:  Reason for Consult: Management of type 1 DM, Hyperglycemia     Surgical Procedure and Date: n/a    Diabetes diagnosis year: 7    Home Diabetes Medications:  Novolog in medtronic 751   Basal rates  12a- 5a: 1.6 u/hr  5a-9a: 3.2 u/hr  9a-1300: 2.2 u/hr  5754-9761: 2.3 u/hr  5987-1266: 2 u/hr    ICR   3391-2973: 5  7761-1914: 6.5  1630-000-: 5    ISF 25    Target     IOB 4 hours        How often checking glucose at home? 3 times a day  BG readings on regimen: 120-260  Hypoglycemia on the regimen?  yes weekly or less  Missed doses on regimen?  No    Diabetes Complications include:     Hyperglycemia and Hypoglycemia     Complicating diabetes co morbidities:   infection      HPI:   Patient is a 38 y.o. male with a diagnosis of T1DM (A1C of 10.4, uses insulin pump) and HTN admitted for cellulitis/abscess and DKA. Patient forgot to bolus dose his insulin pump with dinner and his resultant glucose in the ED was >500. In the ED, labs were notable for HCO3 of 17 , AG of 15, BHB of 1.4, with no GERMAN. CT neck showed no drainable abscess. Denies chills, fevers, NV, or systemic symptoms.         No new subjective & objective note has been filed under this hospital service since the last note was generated.      Labs Reviewed and Include   Recent Labs   Lab  10/01/18   0501   GLU  325*   CALCIUM  8.5*   ALBUMIN  3.1*   NA  136   K  4.4   CO2  21*   CL  105   BUN  17   CREATININE  0.9     Lab Results   Component Value Date    WBC 10.91 09/30/2018    HGB 14.9 09/30/2018    HCT 45.5 09/30/2018    MCV 82 09/30/2018     09/30/2018     No results for input(s): TSH, FREET4 in the last 168 hours.  Lab Results   Component Value Date    HGBA1C 10.4 (H) 09/30/2018       Nutritional status:   Body mass index is 40.29 kg/m².  Lab Results   Component  Value Date    ALBUMIN 3.1 (L) 10/01/2018    ALBUMIN 3.3 (L) 11/09/2017    ALBUMIN 4.6 07/30/2007     No results found for: PREALBUMIN    Estimated Creatinine Clearance: 161.3 mL/min (based on SCr of 0.9 mg/dL).    Accu-Checks  Recent Labs      09/30/18   2110  09/30/18   2149  09/30/18   2247  10/01/18   0841  10/01/18   1240   POCTGLUCOSE  493*  482*  413*  270*  193*        ASSESSMENT and PLAN    * Cellulitis of neck    Managed per primary.   Infection may increase insulin resistance.           Type 1 diabetes mellitus with hyperglycemia    BG goal 110-150 (not critically ill)    Resume home insulin pump with above settings except change target to 110-150.         Insulin pump in place    Patients is alert and oriented and can manage his/her pump  Infusion set and insertion site to be changed every 48-72hrs or prn  Date of last infusion set and insertion site change was 9/30/18  The insertion is located to the LL abdomen  and is clear, without redness          Class 3 severe obesity in adult    May increase insulin resistance.  Body mass index is 40.29 kg/m².                Plan discussed with patient, family, and RN at bedside.     Edith Fisher NP  Endocrinology  [facility]

## 2018-10-01 NOTE — ED TRIAGE NOTES
Pt reports to ED with c/o abscess last night and received an I&D. Pt reports increased swelling/redness today. Pt reports limited ROM near site. Pt reports mild nausea, but denies fever/chills or h/a.    Patient identifiers verified and correct  LOC: The patient is awake, alert and aware of environment with an appropriate affect, the patient is oriented x 3 and speaking appropriately.   APPEARANCE: Patient appears comfortable and in no acute distress, patient is clean and well groomed.  SKIN: The skin is warm, red, and swollen to the back of the neck.  MUSCULOSKELETAL: Patient moving all extremities spontaneously, no swelling noted.  RESPIRATORY: Airway is open and patent, respirations are spontaneous, patient has a normal effort and rate, no accessory muscle use noted

## 2018-10-01 NOTE — HPI
Mr. Clay Waters is a 38 y.o. male with uncontrolled T1DM (A1C of 10.4, uses insulin pump) and HTN presented to the ED on 9/30 with worsening posterior neck pain associated with cellulitis/ abscess. approx 1 week ago, patient noted a small ingrown hair/ pimple. Since then, it has been increasing in size and pain has been worsening. He was seen in the ED on 9/28, at which time an attempt to drain the site was made, however, only 1/2 cc of pus was returned. Patient was prescribed bactrim, and took it for 2-3 doses. On 9/30, the pain and swelling was worsening which prompted the pt to come back to the ED. Patient forgot to bolus dose his insulin pump with dinner and his resultant glucose in the ED was >500. In the ED, labs were notable for HCO3 of 17 , AG of 15, BHB of 1.4, with no GERMAN. CT neck showed no drainable abscess. Denied any fevers or systemic symptoms. No prior hx of MRSA

## 2018-10-01 NOTE — ED PROVIDER NOTES
Encounter Date: 9/30/2018    SCRIBE #1 NOTE: I, Shae Denney, am scribing for, and in the presence of,  Dr. Ramirez. I have scribed the following portions of the note - Other sections scribed: HPI, ROS, PE.       History     Chief Complaint   Patient presents with    Abscess     Patient reports that he was seen in the ED last night for an abscess to his neck. he had an I/D and was sent home with antibiotics. Pt reports increased swelling     Time patient was seen by the provider: 8:45 PM      The patient is a 38 y.o. male with HTN and DM who presents to the ED with a complaint of worsening neck pain and swelling. Patient was seen in the ED yesterday for similar complaint and at that time redness of neck was opened with 18g needle with drainage of approx 0.5ml pus. He was treated with a dose of bactrim in the ED and was sent home with rx for bactrim. He has taken 2 doses of bactrim today. He now states the redness and swelling has worsened in size and pain. He reports his wife applied compression and massaged the area to produce puss, but no relief. He notes he kept a band-aid on throughout he night, and once he took it off today, there was a small amount of pus. He endorses limited ROM to neck and mild nausea. He denies fevers, SOB or chest pain. HI blood sugar has been elevated recently.      The history is provided by the patient and medical records.     Review of patient's allergies indicates:   Allergen Reactions    Codeine Nausea And Vomiting     Past Medical History:   Diagnosis Date    Diabetes mellitus type I     Hypertension      History reviewed. No pertinent surgical history.  History reviewed. No pertinent family history.  Social History     Tobacco Use    Smoking status: Never Smoker    Smokeless tobacco: Never Used   Substance Use Topics    Alcohol use: Yes     Comment: occas, none recently    Drug use: No     Review of Systems   Constitutional: Negative for fever.   HENT: Negative for sore  throat.    Respiratory: Negative for shortness of breath.    Cardiovascular: Negative for chest pain.   Gastrointestinal: Positive for nausea (mild).   Musculoskeletal: Positive for neck pain. Negative for neck stiffness.   Skin: Positive for wound (neck abscess with pain).   Neurological: Negative for headaches.       Physical Exam     Initial Vitals [09/30/18 2013]   BP Pulse Resp Temp SpO2   (!) 157/72 84 18 98.7 °F (37.1 °C) 97 %      MAP       --         Physical Exam    Nursing note and vitals reviewed.  Constitutional: He appears well-developed and well-nourished. No distress.   HENT:   Head: Normocephalic and atraumatic.   Mouth/Throat: No oropharyngeal exudate.   Eyes: EOM are normal.   Neck: Neck supple.   approx 10x5cm area redness and induration Rt posterior neck, small punctate area purulence. No fluctuance. Limited rom of head to lt   Cardiovascular: Normal rate.   Pulmonary/Chest: Breath sounds normal. No respiratory distress.   Abdominal: Soft. Bowel sounds are normal. There is no tenderness.   Musculoskeletal: Normal range of motion. He exhibits no tenderness.   Neurological: He is alert and oriented to person, place, and time. GCS score is 15. GCS eye subscore is 4. GCS verbal subscore is 5. GCS motor subscore is 6.   Skin: Skin is warm and dry.         ED Course   Procedures  Labs Reviewed   CBC W/ AUTO DIFFERENTIAL - Abnormal; Notable for the following components:       Result Value    MCH 26.9 (*)     Lymph% 17.2 (*)     All other components within normal limits   BASIC METABOLIC PANEL - Abnormal; Notable for the following components:    Sodium 132 (*)     CO2 17 (*)     Glucose 571 (*)     All other components within normal limits   BETA - HYDROXYBUTYRATE, SERUM - Abnormal; Notable for the following components:    Beta-Hydroxybutyrate 1.4 (*)     All other components within normal limits   HEMOGLOBIN A1C - Abnormal; Notable for the following components:    Hemoglobin A1C 10.4 (*)     Estimated  Avg Glucose 252 (*)     All other components within normal limits    Narrative:     Add on per Dr Craig order #[id number] 10/01/2018  00:18 [facility]   POCT GLUCOSE - Abnormal; Notable for the following components:    POCT Glucose 493 (*)     All other components within normal limits   ISTAT PROCEDURE - Abnormal; Notable for the following components:    POC PO2 34 (*)     POC SATURATED O2 64 (*)     All other components within normal limits   POCT GLUCOSE - Abnormal; Notable for the following components:    POCT Glucose 482 (*)     All other components within normal limits   POCT GLUCOSE - Abnormal; Notable for the following components:    POCT Glucose 413 (*)     All other components within normal limits   CULTURE, BLOOD   CULTURE, BLOOD   PROTIME-INR   HEMOGLOBIN A1C   POCT GLUCOSE MONITORING CONTINUOUS   POCT GLUCOSE MONITORING CONTINUOUS   POCT GLUCOSE MONITORING CONTINUOUS   POCT GLUCOSE MONITORING CONTINUOUS          Imaging Results          CT Soft Tissue Neck With Contrast (Final result)     Abnormal  Result time 09/30/18 23:16:13    Final result by Han Kruse MD (09/30/18 23:16:13)                 Impression:      Sub solid nodules at both apices measuring 6.5 mm at the right apex and 7.6 mm at the left apex. For multiple sub solid nodules with any ?6 mm, Fleischner Society 2017 guidelines recommend short term follow up non-contrast chest CT in 3-6 months, as these may be secondary to infectious etiology. If these findings persist at that time, subsequent management should be based on the most suspicious nodule.    Over the left occipital scalp region there is some skin thickening and subcutaneous fat stranding as well as a 5 mm lymph node.  Findings could be related to cellulitis and/or postsurgical change from recent incision and drainage.  No drainable abscess collection identified.    Right maxillary sinus mucous retention cyst.    This report was flagged in Epic as abnormal.      Electronically  signed by: Han Kruse MD  Date:    09/30/2018  Time:    23:16             Narrative:    EXAMINATION:  CT SOFT TISSUE NECK WITH CONTRAST    CLINICAL HISTORY:  Mass or lump, skull base;    TECHNIQUE:  Low dose axial images as well as sagittal and coronal reconstructions were performed from the skull base to the clavicles following the intravenous administration of 100 mL of Omnipaque 350.    COMPARISON:  None.    FINDINGS:  Skull Base and Brain (limited evaluation): No obvious abnormality.    Sinuses and Mastoid Air Cells: 2.4 cm mucous retention cyst right maxillary antrum.  Remaining visualized paranasal sinuses and mastoid air cells appear clear.    Salivary Glands: Some fatty involution of the right parotid gland.  Salivary glands otherwise appear unremarkable.    Thyroid: Normal.    Cervical Lymph Nodes: No pathologic enlargement.    Pharynx/Larynx: Normal, with preserved fat planes.    Vasculature (limited evaluation): No obvious filling defect.    Upper Airways and Lungs: Sub solid nodules at both apices measuring 6.5 mm at the right apex and 7.6 mm at the left apex.    Bones: No acute fracture or suspicious lytic or sclerotic lesion.  Over the left occipital scalp region there is some skin thickening and subcutaneous fat stranding as well as a 5 mm lymph node.  No drainable abscess collection identified.                                 Medical Decision Making:   Initial Assessment:   39 y/o M HTN and DM with posteriot Lt neck cellulitis- mild worsening of redness and swelling despite 3 doses of bactrim.  No fluctuance- unlikely abscess but atttempt to needle decompress unsuccessful  Will send routine blood work including blood cultures  POC glucose >450  Beta hydroxybutyrate ordered as well as pH  Bolus 10U insulin and 1L saline and IV clindamycin  CT neck IV contrast to eval for deep abscess/fluid collection  Clinical Tests:   Lab Tests: Ordered and Reviewed  Radiological Study: Ordered and Reviewed             Scribe Attestation:   Scribe #1: I performed the above scribed service and the documentation accurately describes the services I performed. I attest to the accuracy of the note.    Attending Attestation:             Attending ED Notes:   PH 7.38  CT without e/o fluid collection  Discussed with pt going home and continuing PO abx 1-2 days more but pt wants to ensure cellulitis is treated as fast as possible. Will admit for IVF, glucose control and IV abx.             Clinical Impression:   The encounter diagnosis was Cellulitis.      Disposition:   Disposition: Admitted  Condition: Stable                        Floresita Ramirez MD  10/01/18 0328

## 2018-10-01 NOTE — PLAN OF CARE
Problem: Patient Care Overview  Goal: Plan of Care Review  Outcome: Ongoing (interventions implemented as appropriate)  Pt is alert and oriented and is able to ambulate to the restroom. Pt is admitted with cellulitis to back of neck. Pt had an I&D on 9/30 and was released home on antibiotics, pt returned to ER with complaints of swelling and pain. CT of the neck was done and showed some sub-solid nodules and skin thickening. Pt is now receiving IV antibiotics of clindamycin every 6 hours, pt has toradol and tylenol for pain. Blood cultures drawn 9/30 no result to date. Pt is a diabetic and has an insulin pump located to his [facility], the pump was turned off in the ER. Pt did sign insulin pump paperwork. Pt last BG was 274. Pt vitals have been stable no fever noted. Pt is on a diabetic diet. Will continue to monitor pt.

## 2018-10-01 NOTE — PROGRESS NOTES
Food & Nutrition  Education    Diet Education:  Diabetes education  Time Spent:   15 minutes  Learners:  Patient      Nutrition Education provided with handouts: pt did not want handouts      Comments: A1c 10.4%. Pt with DM since young age, uses insulin pump. Per pt, he was very busy over the weekend and forgot to bolus. Pt is aware that one high glucose level will not cause elevated A1c. Discussed importance of monitoring and using insulin pump properly as well as diet and exercise.      All questions and concerns answered. Dietitian's contact information provided.       Follow-Up: 10/08/18    Please Re-consult as needed  Chasity Ruano RD      Thanks!

## 2018-10-01 NOTE — ASSESSMENT & PLAN NOTE
BG goal 110-150 (not critically ill)  Target 110-150  FBG 89, with BG levels >200 throughout the day.       Recommend:  Lower 0000 basal to 1.3u/h (20% decrease)  Increase 0900 basal to 2.6u/h (20% increase)  Adjust ISF 25   Target adjusted to 110-150  Continue ICR 1:5

## 2018-10-01 NOTE — H&P
History and Physical   Hospital Medicine     Patient Name: Clay Waters  MRN:  [id number]  Hospital Medicine Team: Networked reference to record PCT  Shy Craig MD  Date of Admission:  9/30/2018     Principal Problem:  Cellulitis of neck   Primary Care Physician: Primary Doctor No      History of Present Illness:     Mr. Clay Waters is a 38 y.o. male with uncontrolled T1DM (A1C of 10.4, uses insulin pump) and HTN presented to the ED on 9/30 with worsening posterior neck pain associated with cellulitis/ abscess. approx 1 week ago, patient noted a small ingrown hair/ pimple. Since then, it has been increasing in size and pain has been worsening. He was seen in the ED on 9/28, at which time an attempt to drain the site was made, however, only 1/2 cc of pus was returned. Patient was prescribed bactrim, and took it for 2-3 doses. On 9/30, the pain and swelling was worsening which prompted the pt to come back to the ED. Patient forgot to bolus dose his insulin pump with dinner and his resultant glucose in the ED was >500. In the ED, labs were notable for HCO3 of 17 , AG of 15, BHB of 1.4, with no GERMAN. CT neck showed no drainable abscess. Denied any fevers or systemic symptoms. No prior hx of MRSA    Review of Systems   Constitutional: Negative for chills, fatigue, fever.   HENT: Negative for sore throat, trouble swallowing.    Eyes: Negative for photophobia, visual disturbance.   Respiratory: Negative for cough, shortness of breath.    Cardiovascular: Negative for chest pain, palpitations, leg swelling.   Gastrointestinal: Negative for abdominal pain, constipation, diarrhea, nausea, vomiting.   Endocrine: Negative for cold intolerance, heat intolerance.   Genitourinary: Negative for dysuria, frequency.   Musculoskeletal: Negative for arthralgias, myalgias.   Skin: + posterior neck swelling, erythema and tenderness   Neurological: Negative for dizziness, syncope, weakness, light-headedness.    Psychiatric/Behavioral: Negative for confusion, hallucinations, anxiety  All other systems reviewed and are negative.      Past Medical History:   Past Medical History:   Diagnosis Date    Diabetes mellitus type I     Hypertension        Past Surgical History:   History reviewed. No pertinent surgical history.    Social History:   Social History     Tobacco Use    Smoking status: Never Smoker    Smokeless tobacco: Never Used   Substance Use Topics    Alcohol use: Yes     Comment: occas, none recently    Drug use: No       Family History:   History reviewed. No pertinent family history.      Medications: Scheduled Meds:   clindamycin (CLEOCIN) IVPB  900 mg Intravenous Q6H    losartan  50 mg Oral Daily     Continuous Infusions:  PRN Meds:.acetaminophen, dextrose 50%, dextrose 50%, glucagon (human recombinant), glucose, glucose, insulin aspart U-100, ketorolac, ondansetron, ramelteon, senna-docusate 8.6-50 mg, sodium chloride 0.9%    Allergies: Patient is allergic to codeine.    Physical Exam:     Vital Signs (Most Recent):  Temp: 98.7 °F (37.1 °C) (10/01/18 0017)  Pulse: 89 (10/01/18 0017)  Resp: 17 (10/01/18 0017)  BP: (!) 147/93 (10/01/18 0017)  SpO2: 99 % (10/01/18 0017) Vital Signs Range (Last 24H):  Temp:  [98.7 °F (37.1 °C)]   Pulse:  [84-89]   Resp:  [17-18]   BP: (147-157)/(72-93)   SpO2:  [97 %-99 %]    Body mass index is 40.01 kg/m².     Physical Exam:  Constitutional: Well-developed, well-nourished, non-distressed, not diaphoretic.   HENT: NC/AT, external ears normal, oropharynx clear, MMM w/o exudates.   Eyes: PERRL, EOMI, conjunctiva normal, no discharge b/l, no scleral icterus   Neck: normal ROM, supple, (-) cervical adenopathy   CV: RRR, no m/r/g, no carotid bruits, +2 peripheral pulses.  Pulmonary/Chest wall: Breathing comfortably w/o distress, CTAB, no w/r/r, no crackles.  GI: Soft, non-tender, non-distended, (+) BS, (+) BM   Musculoskeletal: Normal ROM, no edema, no atrophy, no tenderness  throughout   Neurological: AAO x 4, CN II-XI in tact, nl sensation, nl strength/tone   Skin: posterior neck with erythema, swelling, tenderness, small area of induration with no drainable abscess  Psych: normal mood and affect, normal behavior, thought content and judgement.  Vitals reviewed.            Labs:    ABG:   Recent Labs   Lab  09/30/18 2133   PH  7.379   PCO2  42.3   PO2  34*   HCO3  25.0   POCSATURATED  64*   BE  0        Chemistries:   Recent Labs   Lab  09/30/18 2106   NA  132*   K  4.4   CL  100   CO2  17*   BUN  20   CREATININE  1.2   CALCIUM  9.3        CBC/Anemia Labs: Coags:    Recent Labs   Lab  09/30/18 2106   WBC  10.91   HGB  14.9   HCT  45.5   PLT  295   MCV  82   RDW  11.9    Recent Labs   Lab  09/30/18 2106   INR  1.0        POCT Glucose: HbA1c:    Recent Labs   Lab  09/30/18 2110 09/30/18 2149 09/30/18   2247   POCTGLUCOSE  493*  482*  413*    Hemoglobin A1C   Date Value Ref Range Status   09/30/2018 10.4 (H) 4.0 - 5.6 % Final     Comment:        Diagnostic Results:    CT Soft Tissue Neck With Contrast     Over the left occipital scalp region there is some skin thickening and subcutaneous fat stranding as well as a 5 mm lymph node.  Findings could be related to cellulitis and/or postsurgical change from recent incision and drainage.  No drainable abscess collection identified.    Sub solid nodules at both apices measuring 6.5 mm at the right apex and 7.6 mm at the left apex. For multiple sub solid nodules with any ?6 mm, Fleischner Society 2017 guidelines recommend short term follow up non-contrast chest CT in 3-6 months, as these may be secondary to infectious etiology. If these findings persist at that time, subsequent management should be based on the most suspicious nodule.      Assessment and Plan:     Mr. Clay Waters is a 38 y.o. male with uncontrolled T1DM (A1C of 10.4, uses insulin pump) and HTN presented to the ED on 9/30 with worsening posterior neck  cellulitis.     Active Hospital Problems    Diagnosis  POA    *Cellulitis of neck [L03.221]  Yes     Priority: 1 - High    Type 1 diabetes mellitus with hyperglycemia [E10.65]  Yes     Priority: 2     Metabolic acidosis due to diabetes mellitus [E11.69, E87.2]  Yes     Priority: 2     Essential hypertension [I10]  Yes      Resolved Hospital Problems   No resolved problems to display.     Cellulitis of neck  - see HPI and photo above. Did not technically fail outpatient bactrim therapy  - no drainable abscess  - IV clindamycin     T2DM with hyperglycemia- A1C of 10.4  Metabolic acidosis due to diabetes mellitus  - s/p 10U insulin IV X2 with 2L NS, in the ED  - bicarb of 17, AG of 15, BHBof 1.4. pH wnl. Likely 2/2 ongoing cellulitis. No concern for DKA at this time  - cont home insulin pump infusion  - POCT glucose checks Q 4 hrs  - diabetic diet  - check BMP in the AM to ensure resolution     HTN  - cont home losartan 50mg       Diet:  diabetic  GI PPx:  n/a  DVT PPx:  scd  Goals of Care:   Disposition:  Admitted to OBS. D/c likely PM of 10/1    Signing Physician:     Shy Craig MD  Hospital Medicine Department  [facility]  Pager [phone] [id number]  10/1/2018

## 2018-10-01 NOTE — ED NOTES
Per Dr. Ramirez: after speaking with med team, insulin not to be given. Insulin not given per order.

## 2018-10-02 PROBLEM — R91.8 LUNG NODULE, MULTIPLE: Status: ACTIVE | Noted: 2018-10-02

## 2018-10-02 LAB
ANION GAP SERPL CALC-SCNC: 12 MMOL/L
BUN SERPL-MCNC: 14 MG/DL
CALCIUM SERPL-MCNC: 9.2 MG/DL
CHLORIDE SERPL-SCNC: 103 MMOL/L
CO2 SERPL-SCNC: 24 MMOL/L
CREAT SERPL-MCNC: 0.9 MG/DL
EST. GFR  (AFRICAN AMERICAN): >60 ML/MIN/1.73 M^2
EST. GFR  (NON AFRICAN AMERICAN): >60 ML/MIN/1.73 M^2
GLUCOSE SERPL-MCNC: 234 MG/DL
POCT GLUCOSE: 190 MG/DL (ref 70–110)
POCT GLUCOSE: 213 MG/DL (ref 70–110)
POCT GLUCOSE: 256 MG/DL (ref 70–110)
POCT GLUCOSE: 265 MG/DL (ref 70–110)
POCT GLUCOSE: 272 MG/DL (ref 70–110)
POTASSIUM SERPL-SCNC: 3.7 MMOL/L
SODIUM SERPL-SCNC: 139 MMOL/L

## 2018-10-02 PROCEDURE — 11000001 HC ACUTE MED/SURG PRIVATE ROOM

## 2018-10-02 PROCEDURE — 36415 COLL VENOUS BLD VENIPUNCTURE: CPT

## 2018-10-02 PROCEDURE — S0077 INJECTION, CLINDAMYCIN PHOSP: HCPCS | Performed by: HOSPITALIST

## 2018-10-02 PROCEDURE — 80048 BASIC METABOLIC PNL TOTAL CA: CPT

## 2018-10-02 PROCEDURE — 25000003 PHARM REV CODE 250: Performed by: HOSPITALIST

## 2018-10-02 PROCEDURE — 63600175 PHARM REV CODE 636 W HCPCS: Performed by: HOSPITALIST

## 2018-10-02 PROCEDURE — 99232 SBSQ HOSP IP/OBS MODERATE 35: CPT | Mod: ,,, | Performed by: INTERNAL MEDICINE

## 2018-10-02 RX ADMIN — CLINDAMYCIN IN 5 PERCENT DEXTROSE 900 MG: 18 INJECTION, SOLUTION INTRAVENOUS at 02:10

## 2018-10-02 RX ADMIN — LOSARTAN POTASSIUM 50 MG: 50 TABLET ORAL at 09:10

## 2018-10-02 RX ADMIN — KETOROLAC TROMETHAMINE 15 MG: 30 INJECTION, SOLUTION INTRAMUSCULAR; INTRAVENOUS at 12:10

## 2018-10-02 RX ADMIN — KETOROLAC TROMETHAMINE 15 MG: 30 INJECTION, SOLUTION INTRAMUSCULAR; INTRAVENOUS at 09:10

## 2018-10-02 RX ADMIN — CLINDAMYCIN IN 5 PERCENT DEXTROSE 900 MG: 18 INJECTION, SOLUTION INTRAVENOUS at 12:10

## 2018-10-02 RX ADMIN — CLINDAMYCIN IN 5 PERCENT DEXTROSE 900 MG: 18 INJECTION, SOLUTION INTRAVENOUS at 06:10

## 2018-10-02 RX ADMIN — CLINDAMYCIN IN 5 PERCENT DEXTROSE 900 MG: 18 INJECTION, SOLUTION INTRAVENOUS at 09:10

## 2018-10-02 RX ADMIN — KETOROLAC TROMETHAMINE 15 MG: 30 INJECTION, SOLUTION INTRAMUSCULAR; INTRAVENOUS at 04:10

## 2018-10-02 NOTE — MEDICAL/APP STUDENT
Hospital Medicine  Progress Note    Patient Name: Clay Waters  MRN: [id number]  Team: [facility] D Caroline Mosquera MD  Admit Date: 9/30/2018  MUSA 10/4/2018  Code status: Full Code    Principal Problem:  Cellulitis of neck    Interval history: Still reporting some neck pain, worse with movement.      Review of Systems   Constitutional: Negative for fever.   Respiratory: Negative for shortness of breath.    Musculoskeletal: Positive for neck pain.       Physical Exam:  Temp:  [96.1 °F (35.6 °C)-98.8 °F (37.1 °C)]   Pulse:  [63-75]   Resp:  [17-20]   BP: (109-144)/(62-74)   SpO2:  [93 %-99 %]      Temp: 96.4 °F (35.8 °C) (10/02/18 1147)  Pulse: 75 (10/02/18 1147)  Resp: 18 (10/02/18 1147)  BP: 139/73 (10/02/18 1147)  SpO2: 98 % (10/02/18 1147)    Intake/Output Summary (Last 24 hours) at 10/2/2018 1430  Last data filed at 10/2/2018 0855  Gross per 24 hour   Intake 240 ml   Output --   Net 240 ml     Weight: 134.8 kg (297 lb 1.1 oz)  Body mass index is 40.29 kg/m².    Physical Exam   Constitutional: No distress.   Eyes: Conjunctivae and lids are normal.   Cardiovascular: S1 normal and S2 normal.   Pulmonary/Chest: Effort normal and breath sounds normal.   Abdominal: Soft. Bowel sounds are normal. There is no tenderness.   Musculoskeletal: He exhibits no edema.   Skin: Skin is warm and dry. There is erythema (Posterior neck, decreased).   Psychiatric: Mood and affect normal.       Significant Labs:  Recent Labs   Lab  09/30/18   2106   WBC  10.91   HGB  14.9   HCT  45.5   PLT  295     Recent Labs   Lab  09/30/18   2106  10/01/18   0501  10/02/18   0545   NA  132*  136  139   K  4.4  4.4  3.7   CL  100  105  103   CO2  17*  21*  24   BUN  20  17  14   CREATININE  1.2  0.9  0.9   GLU  571*  325*  234*   CALCIUM  9.3  8.5*  9.2   MG   --   1.9   --    ALBUMIN   --   3.1*   --    INR  1.0   --    --      Recent Labs   Lab  10/01/18   1240  10/01/18   1739  10/01/18   2013  10/02/18   0432  10/02/18   0756   10/02/18   1237   POCTGLUCOSE  193*  261*  253*  265*  190*  272*     A1C:   Recent Labs   Lab  09/30/18   2119   HGBA1C  10.4*       ABGs:   Recent Labs   Lab  09/30/18   2133   PH  7.379   PCO2  42.3   HCO3  25.0   POCSATURATED  64*   BE  0         Inpatient Medications prescribed for management of current Problems:   Scheduled Meds:    clindamycin (CLEOCIN) IVPB  900 mg Intravenous Q6H    losartan  50 mg Oral Daily     Continuous Infusions:    Home Insulin Pump       As Needed: acetaminophen, dextrose 50%, dextrose 50%, glucagon (human recombinant), glucose, glucose, influenza, ketorolac, ondansetron, pneumoc 13-johnny conj-dip cr(PF), ramelteon, senna-docusate 8.6-50 mg, sodium chloride 0.9%    Active Hospital Problems    Diagnosis  POA    *Cellulitis of neck [L03.221]  Yes    Lung nodule, multiple [R91.8]  Yes    Type 1 diabetes mellitus with hyperglycemia [E10.65]  Yes    Essential hypertension [I10]  Yes    Metabolic acidosis due to diabetes mellitus [E11.69, E87.2]  Yes    Class 3 severe obesity in adult [E66.01]  Yes    Insulin pump in place [Z96.41]  Not Applicable      Resolved Hospital Problems   No resolved problems to display.       Overview:    38 y.o. male with uncontrolled T1DM (A1C of 10.4%, uses insulin pump) and HTN presented with worsening posterior neck cellulitis.     Assessment and Plan for Problems addressed today:    Cellulitis of neck  Lung nodule, multiple  · CT Neck: No drainable abscess  · Continue IV clindamycin, improving  · Consulted ID for further antibiotic recommendations in a diabetic in setting of incidental, multiple sub-solid pulmonary nodules on CT with possibility of infectious etiology (10/2)      Type I DM with hyperglycemia- A1C of 10.4  Metabolic acidosis due to diabetes mellitus  Class 3 severe obesity in adult   · S/p 10Units insulin IV X2 with 2L NS, in the ED.   · Acidosis likely compounded by ongoing cellulitis. No concern for DKA at time of  admission.  · Endocrinology consulted: Continue home insulin pump infusion; monitoring BG.      HTN  · Continue home losartan    Diet: Diet diabetic [facility]; 2000 Calorie  DVT Prophylaxis: KALA hose/SCDs  Anticoagulants   Medication Route Frequency     L/D/A:  PIV    Wound:  Neck    Discharge plan and follow up  Home or Self Care      Provider  Caroline Mosquera MD  [facility] D   Department of Hospital Medicine    [place] Attestation: I personally scribed for Caroline Mosquera MD on 10/02/2018 at 2:41 PM. Electronically signed by katie Edouard on 10/02/2018 at 2:41 PM.

## 2018-10-02 NOTE — PLAN OF CARE
Reviewed BG levels and insulin regimen.     Goal BG while inpatient: 140-180 mg/dl  Current BG levels are above goal    Type 1 DM, on insulin pump with his home settings.  Currently being treated for cellulitis of neck.     Recommendations:   Continue insulin pump with current settings.   POC Q4h    Will continue to follow

## 2018-10-02 NOTE — NURSING
Patient is lying in bed aaox4 with no complaint of pain or discomfort. Vitals are WDL and fall precautions are in place. Patient has verbalized understanding concerning iv antibiotic therapy, and pain medication administration. Will continue to monitor until hand off report.

## 2018-10-02 NOTE — PLAN OF CARE
Problem: Patient Care Overview  Goal: Plan of Care Review  Outcome: Ongoing (interventions implemented as appropriate)  NO ACUTE EVENTS DURING SHIFT. PT TOLERATED ANY AND ALL ACTIVITIES WELL. PT VOIDING WITH NO DIFFICULTY. PT PARTICIPATED WITH APPROPRIATE THERAPIES. ALL QUESTIONS ANSWERED. WILL ENDORSE CARE TO NOC RN

## 2018-10-03 VITALS
HEART RATE: 71 BPM | SYSTOLIC BLOOD PRESSURE: 132 MMHG | OXYGEN SATURATION: 99 % | TEMPERATURE: 98 F | RESPIRATION RATE: 20 BRPM | BODY MASS INDEX: 40.23 KG/M2 | HEIGHT: 72 IN | DIASTOLIC BLOOD PRESSURE: 56 MMHG | WEIGHT: 297.06 LBS

## 2018-10-03 PROBLEM — E11.10 METABOLIC ACIDOSIS DUE TO DIABETES MELLITUS: Status: RESOLVED | Noted: 2018-10-01 | Resolved: 2018-10-03

## 2018-10-03 LAB
ALBUMIN SERPL BCP-MCNC: 3.1 G/DL
ANION GAP SERPL CALC-SCNC: 10 MMOL/L
BASOPHILS # BLD AUTO: 0.07 K/UL
BASOPHILS NFR BLD: 0.9 %
BUN SERPL-MCNC: 10 MG/DL
CALCIUM SERPL-MCNC: 9.4 MG/DL
CHLORIDE SERPL-SCNC: 104 MMOL/L
CO2 SERPL-SCNC: 27 MMOL/L
CREAT SERPL-MCNC: 0.8 MG/DL
DIFFERENTIAL METHOD: NORMAL
EOSINOPHIL # BLD AUTO: 0.3 K/UL
EOSINOPHIL NFR BLD: 4.2 %
ERYTHROCYTE [DISTWIDTH] IN BLOOD BY AUTOMATED COUNT: 12.2 %
EST. GFR  (AFRICAN AMERICAN): >60 ML/MIN/1.73 M^2
EST. GFR  (NON AFRICAN AMERICAN): >60 ML/MIN/1.73 M^2
GLUCOSE SERPL-MCNC: 80 MG/DL
HCT VFR BLD AUTO: 44.8 %
HGB BLD-MCNC: 14.9 G/DL
IMM GRANULOCYTES # BLD AUTO: 0.03 K/UL
IMM GRANULOCYTES NFR BLD AUTO: 0.4 %
LYMPHOCYTES # BLD AUTO: 1.8 K/UL
LYMPHOCYTES NFR BLD: 23.2 %
MAGNESIUM SERPL-MCNC: 1.8 MG/DL
MCH RBC QN AUTO: 27.2 PG
MCHC RBC AUTO-ENTMCNC: 33.3 G/DL
MCV RBC AUTO: 82 FL
MONOCYTES # BLD AUTO: 0.8 K/UL
MONOCYTES NFR BLD: 10.3 %
NEUTROPHILS # BLD AUTO: 4.6 K/UL
NEUTROPHILS NFR BLD: 61 %
NRBC BLD-RTO: 0 /100 WBC
PHOSPHATE SERPL-MCNC: 4.4 MG/DL
PLATELET # BLD AUTO: 292 K/UL
PMV BLD AUTO: 9.8 FL
POCT GLUCOSE: 171 MG/DL (ref 70–110)
POCT GLUCOSE: 89 MG/DL (ref 70–110)
POTASSIUM SERPL-SCNC: 3.6 MMOL/L
RBC # BLD AUTO: 5.47 M/UL
SODIUM SERPL-SCNC: 141 MMOL/L
WBC # BLD AUTO: 7.58 K/UL

## 2018-10-03 PROCEDURE — S0077 INJECTION, CLINDAMYCIN PHOSP: HCPCS | Performed by: HOSPITALIST

## 2018-10-03 PROCEDURE — 85025 COMPLETE CBC W/AUTO DIFF WBC: CPT

## 2018-10-03 PROCEDURE — 63600175 PHARM REV CODE 636 W HCPCS: Performed by: HOSPITALIST

## 2018-10-03 PROCEDURE — 99232 SBSQ HOSP IP/OBS MODERATE 35: CPT | Mod: ,,, | Performed by: INTERNAL MEDICINE

## 2018-10-03 PROCEDURE — 25000003 PHARM REV CODE 250: Performed by: HOSPITALIST

## 2018-10-03 PROCEDURE — 90686 IIV4 VACC NO PRSV 0.5 ML IM: CPT | Performed by: INTERNAL MEDICINE

## 2018-10-03 PROCEDURE — 99239 HOSP IP/OBS DSCHRG MGMT >30: CPT | Mod: ,,, | Performed by: INTERNAL MEDICINE

## 2018-10-03 PROCEDURE — 99254 IP/OBS CNSLTJ NEW/EST MOD 60: CPT | Mod: ,,, | Performed by: INTERNAL MEDICINE

## 2018-10-03 PROCEDURE — 3E02340 INTRODUCTION OF INFLUENZA VACCINE INTO MUSCLE, PERCUTANEOUS APPROACH: ICD-10-PCS | Performed by: INTERNAL MEDICINE

## 2018-10-03 PROCEDURE — 63600175 PHARM REV CODE 636 W HCPCS: Performed by: INTERNAL MEDICINE

## 2018-10-03 PROCEDURE — 80069 RENAL FUNCTION PANEL: CPT

## 2018-10-03 PROCEDURE — 83735 ASSAY OF MAGNESIUM: CPT

## 2018-10-03 PROCEDURE — 90471 IMMUNIZATION ADMIN: CPT | Performed by: INTERNAL MEDICINE

## 2018-10-03 PROCEDURE — 36415 COLL VENOUS BLD VENIPUNCTURE: CPT

## 2018-10-03 RX ORDER — CLINDAMYCIN HYDROCHLORIDE 300 MG/1
300 CAPSULE ORAL EVERY 6 HOURS
Qty: 40 CAPSULE | Refills: 0 | Status: SHIPPED | OUTPATIENT
Start: 2018-10-03 | End: 2018-10-13

## 2018-10-03 RX ADMIN — INFLUENZA A VIRUS A/MICHIGAN/45/2015 X-275 (H1N1) ANTIGEN (FORMALDEHYDE INACTIVATED), INFLUENZA A VIRUS A/SINGAPORE/INFIMH-16-0019/2016 IVR-186 (H3N2) ANTIGEN (FORMALDEHYDE INACTIVATED), INFLUENZA B VIRUS B/PHUKET/3073/2013 ANTIGEN (FORMALDEHYDE INACTIVATED), AND INFLUENZA B VIRUS B/MARYLAND/15/2016 BX-69A ANTIGEN (FORMALDEHYDE INACTIVATED) 0.5 ML: 15; 15; 15; 15 INJECTION, SUSPENSION INTRAMUSCULAR at 06:10

## 2018-10-03 RX ADMIN — LOSARTAN POTASSIUM 50 MG: 50 TABLET ORAL at 10:10

## 2018-10-03 RX ADMIN — CLINDAMYCIN IN 5 PERCENT DEXTROSE 900 MG: 18 INJECTION, SOLUTION INTRAVENOUS at 02:10

## 2018-10-03 RX ADMIN — KETOROLAC TROMETHAMINE 15 MG: 30 INJECTION, SOLUTION INTRAMUSCULAR; INTRAVENOUS at 03:10

## 2018-10-03 RX ADMIN — KETOROLAC TROMETHAMINE 15 MG: 30 INJECTION, SOLUTION INTRAMUSCULAR; INTRAVENOUS at 10:10

## 2018-10-03 RX ADMIN — CLINDAMYCIN IN 5 PERCENT DEXTROSE 900 MG: 18 INJECTION, SOLUTION INTRAVENOUS at 03:10

## 2018-10-03 RX ADMIN — KETOROLAC TROMETHAMINE 15 MG: 30 INJECTION, SOLUTION INTRAMUSCULAR; INTRAVENOUS at 05:10

## 2018-10-03 RX ADMIN — CLINDAMYCIN IN 5 PERCENT DEXTROSE 900 MG: 18 INJECTION, SOLUTION INTRAVENOUS at 10:10

## 2018-10-03 NOTE — CONSULTS
[facility]  Infectious Disease  Consult Note    Patient Name: Clay Waters  MRN: [id number]  Admission Date: 9/30/2018  Hospital Length of Stay: 2 days  Attending Physician: Caroline Mosquera MD  Primary Care Provider: Primary Doctor No     Isolation Status: No active isolations        Inpatient consult to Infectious Diseases  Consult performed by: Mary Jane Chakraborty MD  Consult ordered by: Caroline Mosquera MD      Consult received full consult to follow

## 2018-10-03 NOTE — PLAN OF CARE
BG and insulin regimen reviewed.  Patient T1DM on home insulin pump.    BG goal 140-180.  FBG 80s.        PUMP SETTINGS:   Basal rates  12a- 5a: 1.6 u/hr  5a-9a: 3.2 u/hr  9a-1300: 2.2 u/hr  1389-8726: 2.3 u/hr  3333-5361: 2 u/hr     ICR   4785-5725: 5  5074-8056: 6.5  1630-000-: 5     ISF 25     Target      IOB 4 hours      Recommend:  Lower 0000 basal to 1.3u/h (20% decrease)  Increase 0900 basal to 2.6u/h (20% increase)  Adjust ISF 25      Noemi Rubens Guthrie MD  Endocrinology Fellow

## 2018-10-03 NOTE — SUBJECTIVE & OBJECTIVE
Past Medical History:   Diagnosis Date    Diabetes mellitus type I     Hypertension        History reviewed. No pertinent surgical history.    Review of patient's allergies indicates:   Allergen Reactions    Codeine Nausea And Vomiting       Medications:  Medications Prior to Admission   Medication Sig    INSULIN PUMP CARTRIDGE SUBQ Inject into the skin.    losartan (COZAAR) 50 MG tablet Take 50 mg by mouth once daily.    sulfamethoxazole-trimethoprim 800-160mg (BACTRIM DS) 800-160 mg Tab Take 1 tablet by mouth 2 (two) times daily. for 7 days    [DISCONTINUED] promethazine (PHENERGAN) 25 MG suppository Place 1 suppository (25 mg total) rectally every 6 (six) hours as needed for Nausea.     Antibiotics (From admission, onward)    Start     Stop Route Frequency Ordered    10/01/18 0300  clindamycin 900 MG/50 ML D5W 900 mg/50 mL IVPB 900 mg      -- IV Every 6 hours (non-standard times) 10/01/18 0038        Antifungals (From admission, onward)    None        Antivirals (From admission, onward)    None           There is no immunization history for the selected administration types on file for this patient.    Family History     None        Social History     Socioeconomic History    Marital status:      Spouse name: None    Number of children: None    Years of education: None    Highest education level: None   Social Needs    Financial resource strain: None    Food insecurity - worry: None    Food insecurity - inability: None    Transportation needs - medical: None    Transportation needs - non-medical: None   Occupational History    None   Tobacco Use    Smoking status: Never Smoker    Smokeless tobacco: Never Used   Substance and Sexual Activity    Alcohol use: Yes     Comment: occas, none recently    Drug use: No    Sexual activity: None   Other Topics Concern    None   Social History Narrative    None     Review of Systems   Constitutional: Negative for activity change, appetite  change, chills, diaphoresis, fatigue and fever.   Eyes: Negative for visual disturbance.   Respiratory: Negative for cough, choking, chest tightness, shortness of breath, wheezing and stridor.    Cardiovascular: Negative for chest pain, palpitations and leg swelling.   Gastrointestinal: Negative for abdominal pain.   Musculoskeletal: Negative for arthralgias, back pain and joint swelling.   Neurological: Negative for headaches.     Objective:     Vital Signs (Most Recent):  Temp: 98.6 °F (37 °C) (10/03/18 0830)  Pulse: 71 (10/03/18 0830)  Resp: 19 (10/03/18 0830)  BP: 136/70 (10/03/18 0830)  SpO2: 99 % (10/03/18 0830) Vital Signs (24h Range):  Temp:  [96.1 °F (35.6 °C)-98.6 °F (37 °C)] 98.6 °F (37 °C)  Pulse:  [61-87] 71  Resp:  [18-19] 19  SpO2:  [95 %-99 %] 99 %  BP: (119-142)/(70-82) 136/70     Weight: 134.8 kg (297 lb 1.1 oz)  Body mass index is 40.29 kg/m².    Estimated Creatinine Clearance: 161.3 mL/min (based on SCr of 0.9 mg/dL).    Physical Exam   Constitutional: He is oriented to person, place, and time. He appears well-developed and well-nourished. No distress.   HENT:   Head: Normocephalic and atraumatic.   Eyes: EOM are normal. Pupils are equal, round, and reactive to light.   Neck: Normal range of motion. Neck supple.   Cellulitis in the back side of neck, warm and tender    Cardiovascular: Normal rate, regular rhythm and normal heart sounds.   Pulmonary/Chest: Effort normal and breath sounds normal. No stridor. No respiratory distress. He has no wheezes. He has no rales. He exhibits no tenderness.   Abdominal: He exhibits no distension. There is no tenderness.   Musculoskeletal: He exhibits no edema.   Neurological: He is alert and oriented to person, place, and time.   Skin: Skin is warm and dry. Capillary refill takes less than 2 seconds. He is not diaphoretic. There is erythema.   Psychiatric: He has a normal mood and affect. His behavior is normal. Judgment and thought content normal.   Vitals  reviewed.      Significant Labs:   Blood Culture:   Recent Labs   Lab  09/30/18 2106 09/30/18 2129   LABBLOO  No Growth to date  No Growth to date  No Growth to date  No Growth to date  No Growth to date  No Growth to date     CMP:   Recent Labs   Lab  10/02/18   0545   NA  139   K  3.7   CL  103   CO2  24   GLU  234*   BUN  14   CREATININE  0.9   CALCIUM  9.2   ANIONGAP  12   EGFRNONAA  >60.0       Significant Imaging: I have reviewed all pertinent imaging results/findings within the past 24 hours.

## 2018-10-03 NOTE — SUBJECTIVE & OBJECTIVE
Interval HPI:   Overnight events:  AFVSS. NAEO  Patient concerned with pump settings.  BG globally elevated (200s).  FBG 89 today.     Eatin%  Nausea: No  Hypoglycemia and intervention: No  Fever: No  TPN and/or TF: No    /70   Pulse 71   Temp 98.6 °F (37 °C)   Resp 19   Ht 6' (1.829 m)   Wt 134.8 kg (297 lb 1.1 oz)   SpO2 99%   BMI 40.29 kg/m²     Labs Reviewed and Include    No results for input(s): GLU, CALCIUM, ALBUMIN, PROT, NA, K, CO2, CL, BUN, CREATININE, ALKPHOS, ALT, AST, BILITOT in the last 24 hours.  Lab Results   Component Value Date    WBC 10.91 2018    HGB 14.9 2018    HCT 45.5 2018    MCV 82 2018     2018     No results for input(s): TSH, FREET4 in the last 168 hours.  Lab Results   Component Value Date    HGBA1C 10.4 (H) 2018       Nutritional status:   Body mass index is 40.29 kg/m².  Lab Results   Component Value Date    ALBUMIN 3.1 (L) 10/01/2018    ALBUMIN 3.3 (L) 2017    ALBUMIN 4.6 2007     No results found for: PREALBUMIN    Estimated Creatinine Clearance: 161.3 mL/min (based on SCr of 0.9 mg/dL).    Accu-Checks  Recent Labs      10/01/18   0841  10/01/18   1240  10/01/18   1739  10/01/18   2013  10/02/18   0432  10/02/18   0759  10/02/18   1237  10/02/18   1713  10/02/18   2136  10/03/18   0834   POCTGLUCOSE  270*  193*  261*  253*  265*  190*  272*  256*  213*  89       Current Medications and/or Treatments Impacting Glycemic Control  Immunotherapy:    Immunosuppressants     None        Steroids:   Hormones (From admission, onward)    None        Pressors:    Autonomic Drugs (From admission, onward)    None        Hyperglycemia/Diabetes Medications:   Antihyperglycemics (From admission, onward)    Start     Stop Route Frequency Ordered    10/03/18 0930  insulin aspart U-100 (NovoLOG) insulin pump from home     Question Answer Comment   Basal Rate #1 1.6    Target number 150    Sensitivity #1 25    Carbohydrate  coverage #1 5    Basal rate #1 time 0000    Basal Rate #2 3.2    Basal rate #2 time 0500    Basal rate #3 2.2    Basal rate #3 time 0900    Basal rate #4 2.1    Basal rate #4 time 1300    Basal rate #5 2.3    Basal rate #5 time 1730    Carbohydrate coverage #1 time 1100    Carbohydrate coverage #2 6.5    Carbohydrate coverage #2 time 1630    Carbohydrate coverage #3 5    Sensitivity #1 time 0000        -- SubQ Continuous 10/03/18 0916

## 2018-10-03 NOTE — ASSESSMENT & PLAN NOTE
Patient is a 37 yo male with IDDM, current cellulitis, and incidental found multiple lung nodules with no respiratory symptoms. Afebrile, no change in weight. No risk factor for ID etiologies.  Patient does not have any symptoms/RF concerning for acute/sub acute infectious etiology for the lung nodules. At this time patient does not need to have a work up for this medical problem, although it is benefital to f/u with PCP with interval lung imaging to observe the nodules.  Considering patient's age, Hx of DM, and current cellulitis it is recommended to test for HIV.

## 2018-10-03 NOTE — PROGRESS NOTES
[facility]  Endocrinology  Progress Note    Admit Date: 2018     Reason for Consult: Management of type 1 DM, Hyperglycemia     Surgical Procedure and Date: n/a    Diabetes diagnosis year: 7    Home Diabetes Medications:  Novolog in medtronic 751   Basal rates  12a- 5a: 1.6 u/hr  5a-9a: 3.2 u/hr  9a-1300: 2.2 u/hr  5028-9284: 2.3 u/hr  4433-2684: 2 u/hr    ICR   8708-4445: 5  2252-3979: 6.5  1630-000-: 5    ISF 25    Target     IOB 4 hours        How often checking glucose at home? 3 times a day  BG readings on regimen: 120-260  Hypoglycemia on the regimen?  yes weekly or less  Missed doses on regimen?  No    Diabetes Complications include:     Hyperglycemia and Hypoglycemia     Complicating diabetes co morbidities:   infection      HPI:   Patient is a 38 y.o. male with a diagnosis of T1DM (A1C of 10.4, uses insulin pump) and HTN admitted for cellulitis/abscess and DKA. Patient forgot to bolus dose his insulin pump with dinner and his resultant glucose in the ED was >500. In the ED, labs were notable for HCO3 of 17 , AG of 15, BHB of 1.4, with no GERMAN. CT neck showed no drainable abscess. Denies chills, fevers, NV, or systemic symptoms.         Interval HPI:   Overnight events:  AFVSS. NAEO  Patient concerned with pump settings.  BG globally elevated (200s).  FBG 89 today.     Eatin%  Nausea: No  Hypoglycemia and intervention: No  Fever: No  TPN and/or TF: No    /70   Pulse 71   Temp 98.6 °F (37 °C)   Resp 19   Ht 6' (1.829 m)   Wt 134.8 kg (297 lb 1.1 oz)   SpO2 99%   BMI 40.29 kg/m²      Labs Reviewed and Include    No results for input(s): GLU, CALCIUM, ALBUMIN, PROT, NA, K, CO2, CL, BUN, CREATININE, ALKPHOS, ALT, AST, BILITOT in the last 24 hours.  Lab Results   Component Value Date    WBC 10.91 2018    HGB 14.9 2018    HCT 45.5 2018    MCV 82 2018     2018     No results for input(s): TSH, FREET4 in the last 168 hours.  Lab  Results   Component Value Date    HGBA1C 10.4 (H) 09/30/2018       Nutritional status:   Body mass index is 40.29 kg/m².  Lab Results   Component Value Date    ALBUMIN 3.1 (L) 10/01/2018    ALBUMIN 3.3 (L) 11/09/2017    ALBUMIN 4.6 07/30/2007     No results found for: PREALBUMIN    Estimated Creatinine Clearance: 161.3 mL/min (based on SCr of 0.9 mg/dL).    Accu-Checks  Recent Labs      10/01/18   0841  10/01/18   1240  10/01/18   1739  10/01/18   2013  10/02/18   0432  10/02/18   0759  10/02/18   1237  10/02/18   1713  10/02/18   2136  10/03/18   0834   POCTGLUCOSE  270*  193*  261*  253*  265*  190*  272*  256*  213*  89       Current Medications and/or Treatments Impacting Glycemic Control  Immunotherapy:    Immunosuppressants     None        Steroids:   Hormones (From admission, onward)    None        Pressors:    Autonomic Drugs (From admission, onward)    None        Hyperglycemia/Diabetes Medications:   Antihyperglycemics (From admission, onward)    Start     Stop Route Frequency Ordered    10/03/18 0930  insulin aspart U-100 (NovoLOG) insulin pump from home     Question Answer Comment   Basal Rate #1 1.6    Target number 150    Sensitivity #1 25    Carbohydrate coverage #1 5    Basal rate #1 time 0000    Basal Rate #2 3.2    Basal rate #2 time 0500    Basal rate #3 2.2    Basal rate #3 time 0900    Basal rate #4 2.1    Basal rate #4 time 1300    Basal rate #5 2.3    Basal rate #5 time 1730    Carbohydrate coverage #1 time 1100    Carbohydrate coverage #2 6.5    Carbohydrate coverage #2 time 1630    Carbohydrate coverage #3 5    Sensitivity #1 time 0000        -- SubQ Continuous 10/03/18 0916          ASSESSMENT and PLAN    * Cellulitis of neck    Managed per primary.   Infection may increase insulin resistance.           Insulin pump in place    Patient is alert and oriented and can manage his/her pump.  Infusion set and insertion site to be changed every 48-72hrs or prn  Date of last infusion set and  insertion site change was 10/2/18  The insertion is located to the LL abdomen  and is clear, without redness          Class 3 severe obesity in adult    May increase insulin resistance.  Body mass index is 40.29 kg/m².            Type 1 diabetes mellitus with hyperglycemia    BG goal 110-150 (not critically ill)  Target 110-150  FBG 89, with BG levels >200 throughout the day.       Recommend:  Lower 0000 basal to 1.3u/h (20% decrease)  Increase 0900 basal to 2.6u/h (20% increase)  Adjust ISF 25   Target adjusted to 110-150  Continue ICR 1:5            Noemi Guthrie MD  Endocrinology  [facility]

## 2018-10-03 NOTE — HPI
Clay Waters is a 37 yo male with PMHx of IDDM(HbA1c:10.4, 9/18). He is here due to cellulitis on his neck s/p I&D, currently getting treated with IV Clindamycin. He incidentally found to have pulmonary nodules, largest ones are BL apical, 0.9 and 0.7 cm. ID is consulted to evaluate for infectious etiologies.  Patient is from [facility], has been leaving here, had a trip to [place] in5/18 which had an episode of folliculitis which was treated with oral steroids, no abx.  He works in [employer], no exposure to fumes, never smoked. He has not been abroad, not in skilled nursing, no IVDU.  Patient has an allergic history, asthma in childhood, currently denies cough, hemoptysis, SOB, gets sob with distant walking which per patient is consistent with his obesity and similar to past. No recent weight loss. Does have a dog, no exposure to birds.

## 2018-10-03 NOTE — CONSULTS
[facility]  Infectious Disease  Consult Note    Patient Name: Clay Waters  MRN: [id number]  Admission Date: 9/30/2018  Hospital Length of Stay: 2 days  Attending Physician: Caroline Mosquera MD  Primary Care Provider: Primary Doctor No     Isolation Status: No active isolations    Patient information was obtained from patient, spouse/SO, past medical records and ER records.      Consults  Assessment/Plan:     Lung nodule, multiple    Patient is a 39 yo male with IDDM, current cellulitis, and incidental found multiple lung nodules with no respiratory symptoms. Afebrile, no change in weight. No risk factor for ID etiologies.  Patient does not have any symptoms/RF concerning for acute/sub acute infectious etiology for the lung nodules. At this time patient does not need to have a work up for this medical problem, although it is benefital to f/u with PCP with interval lung imaging to observe the nodules.  Considering patient's age, Hx of DM, and current cellulitis it is recommended to test for HIV.                Thank you for your consult. I will sign off. Please contact us if you have any additional questions.    Lisa Orta MD  Infectious Disease  [facility]    Subjective:     Principal Problem: Cellulitis of neck    HPI: Clay Waters is a 39 yo male with PMHx of IDDM(HbA1c:10.4, 9/18). He is here due to cellulitis on his neck s/p I&D, currently getting treated with IV Clindamycin. He incidentally found to have pulmonary nodules, largest ones are BL apical, 0.9 and 0.7 cm. ID is consulted to evaluate for infectious etiologies.  Patient is from [facility], has been leaving here, had a trip to [place] in5/18 which had an episode of folliculitis which was treated with oral steroids, no abx.  He works in [employer], no exposure to fumes, never smoked. He has not been abroad, not in [facility], no IVDU.  Patient has an allergic history, asthma in childhood, currently denies cough,  hemoptysis, SOB, gets sob with distant walking which per patient is consistent with his obesity and similar to past. No recent weight loss. Does have a dog, no exposure to birds.             Past Medical History:   Diagnosis Date    Diabetes mellitus type I     Hypertension        History reviewed. No pertinent surgical history.    Review of patient's allergies indicates:   Allergen Reactions    Codeine Nausea And Vomiting       Medications:  Medications Prior to Admission   Medication Sig    INSULIN PUMP CARTRIDGE SUBQ Inject into the skin.    losartan (COZAAR) 50 MG tablet Take 50 mg by mouth once daily.    sulfamethoxazole-trimethoprim 800-160mg (BACTRIM DS) 800-160 mg Tab Take 1 tablet by mouth 2 (two) times daily. for 7 days    [DISCONTINUED] promethazine (PHENERGAN) 25 MG suppository Place 1 suppository (25 mg total) rectally every 6 (six) hours as needed for Nausea.     Antibiotics (From admission, onward)    Start     Stop Route Frequency Ordered    10/01/18 0300  clindamycin 900 MG/50 ML D5W 900 mg/50 mL IVPB 900 mg      -- IV Every 6 hours (non-standard times) 10/01/18 0038        Antifungals (From admission, onward)    None        Antivirals (From admission, onward)    None           There is no immunization history for the selected administration types on file for this patient.    Family History     None        Social History     Socioeconomic History    Marital status:      Spouse name: None    Number of children: None    Years of education: None    Highest education level: None   Social Needs    Financial resource strain: None    Food insecurity - worry: None    Food insecurity - inability: None    Transportation needs - medical: None    Transportation needs - non-medical: None   Occupational History    None   Tobacco Use    Smoking status: Never Smoker    Smokeless tobacco: Never Used   Substance and Sexual Activity    Alcohol use: Yes     Comment: occas, none recently     Drug use: No    Sexual activity: None   Other Topics Concern    None   Social History Narrative    None     Review of Systems   Constitutional: Negative for activity change, appetite change, chills, diaphoresis, fatigue and fever.   Eyes: Negative for visual disturbance.   Respiratory: Negative for cough, choking, chest tightness, shortness of breath, wheezing and stridor.    Cardiovascular: Negative for chest pain, palpitations and leg swelling.   Gastrointestinal: Negative for abdominal pain.   Musculoskeletal: Negative for arthralgias, back pain and joint swelling.   Neurological: Negative for headaches.     Objective:     Vital Signs (Most Recent):  Temp: 98.6 °F (37 °C) (10/03/18 0830)  Pulse: 71 (10/03/18 0830)  Resp: 19 (10/03/18 0830)  BP: 136/70 (10/03/18 0830)  SpO2: 99 % (10/03/18 0830) Vital Signs (24h Range):  Temp:  [96.1 °F (35.6 °C)-98.6 °F (37 °C)] 98.6 °F (37 °C)  Pulse:  [61-87] 71  Resp:  [18-19] 19  SpO2:  [95 %-99 %] 99 %  BP: (119-142)/(70-82) 136/70     Weight: 134.8 kg (297 lb 1.1 oz)  Body mass index is 40.29 kg/m².    Estimated Creatinine Clearance: 161.3 mL/min (based on SCr of 0.9 mg/dL).    Physical Exam   Constitutional: He is oriented to person, place, and time. He appears well-developed and well-nourished. No distress.   HENT:   Head: Normocephalic and atraumatic.   Eyes: EOM are normal. Pupils are equal, round, and reactive to light.   Neck: Normal range of motion. Neck supple.   Cellulitis in the back side of neck, warm and tender    Cardiovascular: Normal rate, regular rhythm and normal heart sounds.   Pulmonary/Chest: Effort normal and breath sounds normal. No stridor. No respiratory distress. He has no wheezes. He has no rales. He exhibits no tenderness.   Abdominal: He exhibits no distension. There is no tenderness.   Musculoskeletal: He exhibits no edema.   Neurological: He is alert and oriented to person, place, and time.   Skin: Skin is warm and dry. Capillary refill  takes less than 2 seconds. He is not diaphoretic. There is erythema.   Psychiatric: He has a normal mood and affect. His behavior is normal. Judgment and thought content normal.   Vitals reviewed.      Significant Labs:   Blood Culture:   Recent Labs   Lab  09/30/18 2106 09/30/18 2129   LABBLOO  No Growth to date  No Growth to date  No Growth to date  No Growth to date  No Growth to date  No Growth to date     CMP:   Recent Labs   Lab  10/02/18   0545   NA  139   K  3.7   CL  103   CO2  24   GLU  234*   BUN  14   CREATININE  0.9   CALCIUM  9.2   ANIONGAP  12   EGFRNONAA  >60.0       Significant Imaging: I have reviewed all pertinent imaging results/findings within the past 24 hours.

## 2018-10-03 NOTE — PROGRESS NOTES
Physician Attestation for Scribe:  I, Caroline Mosquera MD, personally performed the services described in this documentation. All medical record entries made by the scribe were at my direction and in my presence.  I have reviewed this note and agree that the record reflects my personal performance and is accurate and complete.     Hospital Medicine  Progress Note     Patient Name: Clay Waters  MRN: [id number]  [place] [facility] [place] [facility] Caroline Mosquera MD  Admit Date: 9/30/2018  MUSA 10/4/2018  Code status: Full Code     Principal Problem:  Cellulitis of neck     Interval history: Still reporting some neck pain, worse with movement.       Review of Systems   Constitutional: Negative for fever.   Respiratory: Negative for shortness of breath.    Musculoskeletal: Positive for neck pain.         Physical Exam:  Temp:  [96.1 °F (35.6 °C)-98.8 °F (37.1 °C)]   Pulse:  [63-75]   Resp:  [17-20]   BP: (109-144)/(62-74)   SpO2:  [93 %-99 %]       Temp: 96.4 °F (35.8 °C) (10/02/18 1147)  Pulse: 75 (10/02/18 1147)  Resp: 18 (10/02/18 1147)  BP: 139/73 (10/02/18 1147)  SpO2: 98 % (10/02/18 1147)     Intake/Output Summary (Last 24 hours) at 10/2/2018 1430  Last data filed at 10/2/2018 0855      Gross per 24 hour   Intake 240 ml   Output --   Net 240 ml      Weight: 134.8 kg (297 lb 1.1 oz)  Body mass index is 40.29 kg/m².     Physical Exam   Constitutional: No distress.   Eyes: Conjunctivae and lids are normal.   Cardiovascular: S1 normal and S2 normal.   Pulmonary/Chest: Effort normal and breath sounds normal.   Abdominal: Soft. Bowel sounds are normal. There is no tenderness.   Musculoskeletal: He exhibits no edema.   Skin: Skin is warm and dry. There is erythema (Posterior neck, decreased).   Psychiatric: Mood and affect normal.         Significant Labs:      Recent Labs   Lab  09/30/18 2106   WBC  10.91   HGB  14.9   HCT  45.5   PLT  295            Recent Labs   Lab  09/30/18   2106  10/01/18   0501  10/02/18   0545   NA   132*  136  139   K  4.4  4.4  3.7   CL  100  105  103   CO2  17*  21*  24   BUN  20  17  14   CREATININE  1.2  0.9  0.9   GLU  571*  325*  234*   CALCIUM  9.3  8.5*  9.2   MG   --   1.9   --    ALBUMIN   --   3.1*   --    INR  1.0   --    --       Recent Labs   Lab  10/01/18   1240  10/01/18   1739  10/01/18   2013  10/02/18   0432  10/02/18   0759  10/02/18   1237   POCTGLUCOSE  193*  261*  253*  265*  190*  272*      A1C:       Recent Labs   Lab  09/30/18   2119   HGBA1C  10.4*         ABGs:       Recent Labs   Lab  09/30/18   2133   PH  7.379   PCO2  42.3   HCO3  25.0   POCSATURATED  64*   BE  0            Inpatient Medications prescribed for management of current Problems:   Scheduled Meds:    clindamycin (CLEOCIN) IVPB  900 mg Intravenous Q6H    losartan  50 mg Oral Daily      Continuous Infusions:    Home Insulin Pump        As Needed: acetaminophen, dextrose 50%, dextrose 50%, glucagon (human recombinant), glucose, glucose, influenza, ketorolac, ondansetron, pneumoc 13-johnny conj-dip cr(PF), ramelteon, senna-docusate 8.6-50 mg, sodium chloride 0.9%           Active Hospital Problems     Diagnosis   POA    *Cellulitis of neck [L03.221]   Yes    Lung nodule, multiple [R91.8]   Yes    Type 1 diabetes mellitus with hyperglycemia [E10.65]   Yes    Essential hypertension [I10]   Yes    Metabolic acidosis due to diabetes mellitus [E11.69, E87.2]   Yes    Class 3 severe obesity in adult [E66.01]   Yes    Insulin pump in place [Z96.41]   Not Applicable       Resolved Hospital Problems   No resolved problems to display.         Overview:    38 y.o. male with uncontrolled T1DM (A1C of 10.4%, uses insulin pump) and HTN presented with worsening posterior neck cellulitis.      Assessment and Plan for Problems addressed today:     Cellulitis of neck  Lung nodule, multiple  · CT Neck: No drainable abscess  · Continue IV clindamycin, improving  · Consulted ID for further antibiotic recommendations in a diabetic in  setting of incidental, multiple sub-solid pulmonary nodules on CT with possibility of infectious etiology (10/2)      Type I DM with hyperglycemia- A1C of 10.4  Metabolic acidosis due to diabetes mellitus  Class 3 severe obesity in adult   · S/p 10Units insulin IV X2 with 2L NS, in the ED.   · Acidosis likely compounded by ongoing cellulitis. No concern for DKA at time of admission.  · Endocrinology consulted: Continue home insulin pump infusion; monitoring BG.      HTN  · Continue home losartan     Diet: Diet diabetic [facility]; 2000 Calorie  DVT Prophylaxis: KALA hose/James       Anticoagulants   Medication Route Frequency      L/D/A:  PIV     Wound:  Neck     Discharge plan and follow up  Home or Self Care        Provider  Caroline Mosquera MD  [facility] [place] [facility] MED D   Department of Hospital Medicine     Scribe Attestation: I personally scribed for Caroline Mosquera MD on 10/02/2018 at 2:41 PM. Electronically signed by katie Edouard on 10/02/2018 at 2:41 PM.

## 2018-10-03 NOTE — MEDICAL/APP STUDENT
Hospital Medicine  Progress Note    Patient Name: Clay Waters  MRN: [id number]  Team: [facility] [place] [facility] D Caroline Mosquera MD  Admit Date: 9/30/2018  MUSA 10/5/2018  Code status: Full Code    Principal Problem:  Cellulitis of neck    Interval history: Patient with no new complaints.    Review of Systems   Constitutional: Negative for fever.   Respiratory: Negative for shortness of breath.    Musculoskeletal: Positive for neck pain.       Physical Exam:  Temp:  [96.1 °F (35.6 °C)-98.6 °F (37 °C)]   Pulse:  [61-87]   Resp:  [18-19]   BP: (119-142)/(70-82)   SpO2:  [95 %-99 %]      Temp: 98.6 °F (37 °C) (10/03/18 0830)  Pulse: 71 (10/03/18 0830)  Resp: 19 (10/03/18 0830)  BP: 136/70 (10/03/18 0830)  SpO2: 99 % (10/03/18 0830)    Intake/Output Summary (Last 24 hours) at 10/3/2018 1150  Last data filed at 10/2/2018 1300  Gross per 24 hour   Intake 360 ml   Output --   Net 360 ml     Weight: 134.8 kg (297 lb 1.1 oz)  Body mass index is 40.29 kg/m².    Physical Exam   Constitutional: No distress.   Eyes: Conjunctivae and lids are normal.   Cardiovascular: S1 normal and S2 normal.   Pulmonary/Chest: Effort normal and breath sounds normal.   Abdominal: Soft. Bowel sounds are normal. There is no tenderness.   Musculoskeletal: He exhibits no edema.   Skin: Skin is warm and dry. There is erythema (Posterior neck, decreased).   Psychiatric: Mood and affect normal.       Significant Labs:  Recent Labs   Lab  09/30/18   2106  10/03/18   0728   WBC  10.91  7.58   HGB  14.9  14.9   HCT  45.5  44.8   PLT  295  292     Recent Labs   Lab  09/30/18   2106  10/01/18   0501  10/02/18   0545   NA  132*  136  139   K  4.4  4.4  3.7   CL  100  105  103   CO2  17*  21*  24   BUN  20  17  14   CREATININE  1.2  0.9  0.9   GLU  571*  325*  234*   CALCIUM  9.3  8.5*  9.2   MG   --   1.9   --    ALBUMIN   --   3.1*   --    INR  1.0   --    --      Recent Labs   Lab  10/02/18   0432  10/02/18   0759  10/02/18   1237  10/02/18   1713  10/02/18    2136  10/03/18   0834   POCTGLUCOSE  265*  190*  272*  256*  213*  89     A1C:   Recent Labs   Lab  09/30/18   2119   HGBA1C  10.4*       Inpatient Medications prescribed for management of current Problems:   Scheduled Meds:    clindamycin (CLEOCIN) IVPB  900 mg Intravenous Q6H    losartan  50 mg Oral Daily     Continuous Infusions:    Home Insulin Pump       As Needed: acetaminophen, dextrose 50%, dextrose 50%, glucagon (human recombinant), glucose, glucose, influenza, ketorolac, ondansetron, pneumoc 13-johnny conj-dip cr(PF), ramelteon, senna-docusate 8.6-50 mg, sodium chloride 0.9%    Active Hospital Problems    Diagnosis  POA    *Cellulitis of neck [L03.221]  Yes    Lung nodule, multiple [R91.8]  Yes    Type 1 diabetes mellitus with hyperglycemia [E10.65]  Yes    Essential hypertension [I10]  Yes    Metabolic acidosis due to diabetes mellitus [E11.69, E87.2]  Yes    Class 3 severe obesity in adult [E66.01]  Yes    Insulin pump in place [Z96.41]  Not Applicable      Resolved Hospital Problems   No resolved problems to display.       Overview:    38 y.o. male with uncontrolled T1DM (A1C of 10.4%, uses insulin pump) and HTN presented with worsening posterior neck cellulitis. He was managed with IV clindamycin. CT neck did not reveal any drainable abscesses, but did show an incidental finding of multiple sub-solid pulmonary nodules which may be of infectious etiology. ID was consulted and recommended HIV testing with PCP f/u for further evaluation.     Assessment and Plan for Problems addressed today:    Cellulitis of neck  Lung nodule, multiple  · CT Neck: No drainable abscess  · Continue IV clindamycin, improving  · Consulted ID for further antibiotic recommendations in a diabetic in setting of incidental, multiple sub-solid pulmonary nodules on CT with possibility of infectious etiology (10/2)   · Per ID, HIV Ab recommended. Plan for PCP f/u to assess evolution of pulmonary nodules in 3 months.      Type I  DM with hyperglycemia- A1C of 10.4  Metabolic acidosis due to diabetes mellitus  Class 3 severe obesity in adult   · S/p 10Units insulin IV X2 with 2L NS, in the ED.   · Acidosis likely compounded by ongoing cellulitis. No concern for DKA at time of admission.  · Endocrinology consulted: Continued home insulin pump infusion; monitoring BG.      HTN  · Continued home losartan    Diet: Diet diabetic [facility]; 2000 Calorie  DVT Prophylaxis: KALA hose/SCDs  Anticoagulants   Medication Route Frequency     L/D/A:  PIV    Wound:  Neck    Discharge plan and follow up  Home or Self Care      Provider  Caroline Mosquera MD  [facility]   Department of Hospital Medicine    Scribe Attestation: I personally scribed for Caroline Mosquera MD on 10/03/2018 at 2:41 PM. Electronically signed by katie Edouard on 10/03/2018 at 2:41 PM.

## 2018-10-03 NOTE — DISCHARGE SUMMARY
"Discharge Summary  Hospital Medicine    Patient Name: Clay Waters  MRN: [id number]  Attending Provider on Discharge: Caroline Mosquera MD  Hospital Medicine Team: [facility]  Date of Admission:  9/30/2018     Date of Discharge:  10/3/2018  7:10 PM   Code status: Full Code    Active Hospital Problems    Diagnosis  POA    *Cellulitis of neck [L03.221]  Yes    Lung nodule, multiple [R91.8]  Yes    Type 1 diabetes mellitus with hyperglycemia [E10.65]  Yes    Essential hypertension [I10]  Yes    Class 3 severe obesity in adult [E66.01]  Yes    Insulin pump in place [Z96.41]  Not Applicable      Resolved Hospital Problems    Diagnosis Date Resolved POA    Metabolic acidosis due to diabetes mellitus [E11.69, E87.2] 10/03/2018 Yes        HPI: "38 y.o. male with uncontrolled T1DM (A1C of 10.4, uses insulin pump) and HTN presented to the ED on 9/30 with worsening posterior neck pain associated with cellulitis/?abscess. approx 1 week ago, patient noted a small ingrown hair/ pimple. Since then, it has been increasing in size and pain has been worsening. He was seen in the ED on 9/28, at which time an attempt to drain the site was made, however, only 1/2 cc of pus was returned. Patient was prescribed Bactrim, and took it for 2 doses. On 9/30, the pain and swelling was worsening which prompted the pt to come back to the ED. Patient forgot to bolus dose his insulin pump with dinner and his resultant glucose in the ED was >500. In the ED, labs were notable for HCO3 of 17 , AG of 15, BHB of 1.4, with no GERMAN. CT neck showed no drainable abscess. Denied any fevers or systemic symptoms. No prior hx of MRSA."    Hospital Course: Patient with uncontrolled T1DM (insulin pump) and HTN admitted with worsening posterior neck cellulitis. He was managed with IV clindamycin. CT neck did not reveal any drainable abscesses, but did show an incidental finding of multiple sub-solid pulmonary nodules which may be of infectious " etiology. Chest CT confirmed the presence of 3 sub-solid pulmonary nodules. ID was consulted and recommended HIV testing with PCP f/u for further evaluation.     Cellulitis of neck  Lung nodule, multiple  · CT Neck: No drainable abscess  · Continued IV clindamycin, improving  · Consulted ID for further antibiotic recommendations in a diabetic in setting of incidental, multiple sub-solid pulmonary nodules on CT with possibility of infectious etiology and previous episode of folliculitis with possible bacteremia. (10/2)   · Per ID, HIV Ab recommended. Plan for PCP follow up to assess evolution of pulmonary nodules with CT Chest in 3 months. Continue Clindamycin.   · Patient discharged at 5 PM with continuation of previous Bactrim prescription and addition of clindamycin.     Type I DM with hyperglycemia- A1C of 10.4  Metabolic acidosis due to diabetes mellitus  Class 3 severe obesity in adult   · S/p 10 Units insulin IV X2 with 2L NS, in the ED.   · Acidosis likely compounded by ongoing cellulitis. No concern for DKA at time of admission.  · Endocrinology consulted: Continued home insulin pump infusion with adjustments as recommended by Endocrinology; monitoring BG. Needs close OP follow up.     HTN  · Continued home losartan       Recent Labs   Lab  09/30/18   2106  10/03/18   0728   WBC  10.91  7.58   HGB  14.9  14.9   HCT  45.5  44.8   PLT  295  292     Recent Labs   Lab  10/01/18   0501  10/02/18   0545  10/03/18   0728   NA  136  139  141   K  4.4  3.7  3.6   CL  105  103  104   CO2  21*  24  27   BUN  17  14  10   CREATININE  0.9  0.9  0.8   GLU  325*  234*  80   CALCIUM  8.5*  9.2  9.4   MG  1.9   --   1.8   PHOS   --    --   4.4     Recent Labs   Lab  09/30/18   2106  10/01/18   0501  10/03/18   0728   ALBUMIN   --   3.1*  3.1*   INR  1.0   --    --       Recent Labs   Lab  10/02/18   0759  10/02/18   1237  10/02/18   1713  10/02/18   2136  10/03/18   0834  10/03/18   1221   POCTGLUCOSE  190*  272*  256*  213*   89  171*     Recent Labs   Lab  09/30/18 2119   HGBA1C  10.4*     Procedure Component Value Units Date/Time   CT Chest Without Contrast [[id number]] (Abnormal) Resulted: 10/02/18 1517   Order Status: Completed Updated: 10/02/18 1519   Narrative:     EXAMINATION:  CT CHEST WITHOUT CONTRAST    CLINICAL HISTORY:  Sub solid nodules - r/o infectious etiology;    TECHNIQUE:  Low dose axial images, sagittal and coronal reformations were obtained from the thoracic inlet to the lung bases. Contrast was not administered.    COMPARISON:  CT soft tissue neck 09/30/2018    FINDINGS:  Base of Neck: No significant abnormality.    Thoracic soft tissues: Normal.    Aorta: Left-sided aortic arch.  No aneurysm and no significant atherosclerosis    Heart: Normal size. No effusion.    Pulmonary vasculature: Pulmonary arteries distribute normally.  There are four pulmonary veins.    Jessie/Mediastinum: No lymphadenopathy noted on this noncontrast exam.    Airways: Trachea midline with proximal airways patent.    Lungs/Pleura: Lungs well expanded bilaterally without significant consolidation, pleural effusion, pneumothorax, or mass.  There are 3 sub solid pulmonary nodules with the largest on the right located within the apical segment of the right upper lobe measuring approximately 0.7 cm (axial series 3, image 33) and the largest on the left located within the posterior apical segment of the left upper lobe measuring approximately 0.9 cm (axial series 3, image 54).  For multiple sub solid nodules with any ?6 mm, Fleischner Society 2017 guidelines recommend short term follow up non-contrast chest CT in 3-6 months, as these may be secondary to infectious etiology. If these findings persist at that time, subsequent management should be based on the most suspicious nodule.    Esophagus: Normal.    Upper Abdomen: Borderline splenomegaly, otherwise no significant abnormality involving the partially visualized upper abdominal  structures.    Bones: No acute fracture. No suspicious lytic or sclerotic lesions.     Impression:       Three sub-solid pulmonary nodules with the largest on the right located within the apical segment of the right upper lobe measuring approximately 0.7 cm (axial series 3, image 33) and the largest on the left located within the posterior apical segment of the left upper lobe measuring approximately 0.9 cm (axial series 3, image 54).  For multiple sub solid nodules with any ?6 mm, Fleischner Society 2017 guidelines recommend short term follow up non-contrast chest CT in 3-6 months, as these may be secondary to infectious etiology. If these findings persist at that time, subsequent management should be based on the most suspicious nodule.    Borderline splenomegaly.    This report was flagged in Epic as abnormal.    Electronically signed by resident: Олег Vila  Date: 10/02/2018  Time: 13:20    Electronically signed by: Rupal Alvarado MD  Date: 10/02/2018  Time: 15:17     Imaging Results          CT Soft Tissue Neck With Contrast (Final result)     Abnormal  Result time 09/30/18 23:16:13    Final result by Han Kruse MD (09/30/18 23:16:13)                 Impression:      Sub solid nodules at both apices measuring 6.5 mm at the right apex and 7.6 mm at the left apex. For multiple sub solid nodules with any ?6 mm, Fleischner Society 2017 guidelines recommend short term follow up non-contrast chest CT in 3-6 months, as these may be secondary to infectious etiology. If these findings persist at that time, subsequent management should be based on the most suspicious nodule.    Over the left occipital scalp region there is some skin thickening and subcutaneous fat stranding as well as a 5 mm lymph node.  Findings could be related to cellulitis and/or postsurgical change from recent incision and drainage.  No drainable abscess collection identified.    Right maxillary sinus mucous retention cyst.    This report  was flagged in Epic as abnormal.      Electronically signed by: Han Kruse MD  Date:    09/30/2018  Time:    23:16             Narrative:    EXAMINATION:  CT SOFT TISSUE NECK WITH CONTRAST    CLINICAL HISTORY:  Mass or lump, skull base;    TECHNIQUE:  Low dose axial images as well as sagittal and coronal reconstructions were performed from the skull base to the clavicles following the intravenous administration of 100 mL of Omnipaque 350.    COMPARISON:  None.    FINDINGS:  Skull Base and Brain (limited evaluation): No obvious abnormality.    Sinuses and Mastoid Air Cells: 2.4 cm mucous retention cyst right maxillary antrum.  Remaining visualized paranasal sinuses and mastoid air cells appear clear.    Salivary Glands: Some fatty involution of the right parotid gland.  Salivary glands otherwise appear unremarkable.    Thyroid: Normal.    Cervical Lymph Nodes: No pathologic enlargement.    Pharynx/Larynx: Normal, with preserved fat planes.    Vasculature (limited evaluation): No obvious filling defect.    Upper Airways and Lungs: Sub solid nodules at both apices measuring 6.5 mm at the right apex and 7.6 mm at the left apex.    Bones: No acute fracture or suspicious lytic or sclerotic lesion.  Over the left occipital scalp region there is some skin thickening and subcutaneous fat stranding as well as a 5 mm lymph node.  No drainable abscess collection identified.                              Procedures: none    Consultants:   Consults (From admission, onward)        Status Ordering Provider     Inpatient consult to Endocrinology  Once     Provider:  (Not yet assigned)    Completed CM OSUNA     Inpatient consult to Infectious Diseases  Once     Provider:  (Not yet assigned)    Completed RICHA GONZALES          Medications:  Reconciled Home Medications:      Medication List      START taking these medications    clindamycin 300 MG capsule  Commonly known as:  CLEOCIN  Take 1 capsule (300 mg total) by  mouth every 6 (six) hours. for 10 days        CONTINUE taking these medications    INSULIN PUMP CARTRIDGE SUBQ  Inject into the skin.     losartan 50 MG tablet  Commonly known as:  COZAAR  Take 50 mg by mouth once daily.     sulfamethoxazole-trimethoprim 800-160mg 800-160 mg Tab  Commonly known as:  BACTRIM DS  Take 1 tablet by mouth 2 (two) times daily. for 7 days        STOP taking these medications    promethazine 25 MG suppository  Commonly known as:  PHENERGAN            Discharge Instructions:  Discharge Procedure Orders   Ambulatory Referral to Infectious Disease   Referral Priority: Routine Referral Type: Consultation   Referral Reason: Specialty Services Required   Requested Specialty: Infectious Diseases   Number of Visits Requested: 1     Ambulatory Referral to Internal Medicine   Referral Priority: Routine Referral Type: Consultation   Referral Reason: Specialty Services Required   Requested Specialty: Internal Medicine   Number of Visits Requested: 1     Diet diabetic     Notify your health care provider if you experience any of the following:  temperature >100.4     Notify your health care provider if you experience any of the following:  persistent nausea and vomiting or diarrhea     Notify your health care provider if you experience any of the following:  difficulty breathing or increased cough     Notify your health care provider if you experience any of the following:  persistent dizziness, light-headedness, or visual disturbances     Activity as tolerated       Discharge Condition: good    Disposition:  Home or Self Care    Indwelling Lines/Drains at time of discharge: none    Tests pending at the time of discharge: none      Time spent on the discharge of the patient including review of hospital course with the patient, reviewing discharge medications and arranging follow-up care: 45 minutes.    Discharge examination Patient was seen and examined on 10/3/2018 and determined to be suitable for  discharge.    Discharge plan and follow up:  Follow-up Information     Apryl Rao PA-C On 10/17/2018.    Specialty:  Infectious Diseases  Why:  11:30 with infectious disease  Contact information:  [address]  [facility] [id number]  [phone]             [facility] On 10/12/2018.    Specialty:  Priority Care  Why:  12:30 pm  Contact information:  [address]  [facility] [address] [phone]  Additional information:  [facility]               Future Appointments   Date Time Provider Department Center   10/12/2018 12:30 PM SHAUN Peres [facility]   10/17/2018 11:30 AM Apryl Rao PA-C [facility]       Provider  Caroline Mosquera MD  Department of Hospital Medicine  [facility]

## 2018-10-05 LAB
BACTERIA BLD CULT: NORMAL
BACTERIA BLD CULT: NORMAL

## 2018-10-08 NOTE — PROGRESS NOTES
"[facility]  New Visit Progress Note   Recent Hospital Discharge     PRESENTING HISTORY     Chief Complaint/Reason for Visit:  Follow up Hospital Discharge   No chief complaint on file.    PCP: Primary Doctor No    History of Present Illness: Mr. Clay Waters is a 38 y.o. male who was recently admitted to the hospital.    Discharge Summary  Hospital Medicine     Patient Name: Clay Waters  MRN: [id number]  Attending Provider on Discharge: Caroline Mosquera MD  Hospital Medicine Team: [facility] MED D  Date of Admission:  9/30/2018     Date of Discharge:  10/3/2018  7:10 PM   Code status: Full Code           Active Hospital Problems     Diagnosis   POA    *Cellulitis of neck [L03.221]   Yes    Lung nodule, multiple [R91.8]   Yes    Type 1 diabetes mellitus with hyperglycemia [E10.65]   Yes    Essential hypertension [I10]   Yes    Class 3 severe obesity in adult [E66.01]   Yes    Insulin pump in place [Z96.41]   Not Applicable       Resolved Hospital Problems     Diagnosis Date Resolved POA    Metabolic acidosis due to diabetes mellitus [E11.69, E87.2] 10/03/2018 Yes         HPI: "38 y.o. male with uncontrolled T1DM (A1C of 10.4, uses insulin pump) and HTN presented to the ED on 9/30 with worsening posterior neck pain associated with cellulitis/?abscess. approx 1 week ago, patient noted a small ingrown hair/ pimple. Since then, it has been increasing in size and pain has been worsening. He was seen in the ED on 9/28, at which time an attempt to drain the site was made, however, only 1/2 cc of pus was returned. Patient was prescribed Bactrim, and took it for 2 doses. On 9/30, the pain and swelling was worsening which prompted the pt to come back to the ED. Patient forgot to bolus dose his insulin pump with dinner and his resultant glucose in the ED was >500. In the ED, labs were notable for HCO3 of 17 , AG of 15, BHB of 1.4, with no GERMAN. CT neck showed no drainable abscess. Denied any fevers or " "systemic symptoms. No prior hx of MRSA."     Hospital Course: Patient with uncontrolled T1DM (insulin pump) and HTN admitted with worsening posterior neck cellulitis. He was managed with IV clindamycin. CT neck did not reveal any drainable abscesses, but did show an incidental finding of multiple sub-solid pulmonary nodules which may be of infectious etiology. Chest CT confirmed the presence of 3 sub-solid pulmonary nodules. ID was consulted and recommended HIV testing with PCP f/u for further evaluation.      Cellulitis of neck  Lung nodule, multiple  · CT Neck: No drainable abscess  · Continued IV clindamycin, improving  · Consulted ID for further antibiotic recommendations in a diabetic in setting of incidental, multiple sub-solid pulmonary nodules on CT with possibility of infectious etiology and previous episode of folliculitis with possible bacteremia. (10/2)   · Per ID, HIV Ab recommended. Plan for PCP follow up to assess evolution of pulmonary nodules with CT Chest in 3 months. Continue Clindamycin.   · Patient discharged at 5 PM with continuation of previous Bactrim prescription and addition of clindamycin.     Type I DM with hyperglycemia- A1C of 10.4  Metabolic acidosis due to diabetes mellitus  Class 3 severe obesity in adult   · S/p 10 Units insulin IV X2 with 2L NS, in the ED.   · Acidosis likely compounded by ongoing cellulitis. No concern for DKA at time of admission.  · Endocrinology consulted: Continued home insulin pump infusion with adjustments as recommended by Endocrinology; monitoring BG. Needs close OP follow up.     HTN  · Continued home losartan             Recent Labs   Lab  09/30/18   2106  10/03/18   0728   WBC  10.91  7.58   HGB  14.9  14.9   HCT  45.5  44.8   PLT  295  292            Recent Labs   Lab  10/01/18   0501  10/02/18   0545  10/03/18   0728   NA  136  139  141   K  4.4  3.7  3.6   CL  105  103  104   CO2  21*  24  27   BUN  17  14  10   CREATININE  0.9  0.9  0.8   GLU  325*  " 234*  80   CALCIUM  8.5*  9.2  9.4   MG  1.9   --   1.8   PHOS   --    --   4.4            Recent Labs   Lab  09/30/18   2106  10/01/18   0501  10/03/18   0728   ALBUMIN   --   3.1*  3.1*   INR  1.0   --    --                Recent Labs   Lab  10/02/18   0759  10/02/18   1237  10/02/18   1713  10/02/18   2136  10/03/18   0834  10/03/18   1221   POCTGLUCOSE  190*  272*  256*  213*  89  171*          Recent Labs   Lab  09/30/18   2119   HGBA1C  10.4*      Procedure Component Value Units Date/Time   CT Chest Without Contrast [[id number]] (Abnormal) Resulted: 10/02/18 1517   Order Status: Completed Updated: 10/02/18 1519   Narrative:     EXAMINATION:  CT CHEST WITHOUT CONTRAST    CLINICAL HISTORY:  Sub solid nodules - r/o infectious etiology;    TECHNIQUE:  Low dose axial images, sagittal and coronal reformations were obtained from the thoracic inlet to the lung bases. Contrast was not administered.    COMPARISON:  CT soft tissue neck 09/30/2018    FINDINGS:  Base of Neck: No significant abnormality.    Thoracic soft tissues: Normal.    Aorta: Left-sided aortic arch.  No aneurysm and no significant atherosclerosis    Heart: Normal size. No effusion.    Pulmonary vasculature: Pulmonary arteries distribute normally.  There are four pulmonary veins.    Jessie/Mediastinum: No lymphadenopathy noted on this noncontrast exam.    Airways: Trachea midline with proximal airways patent.    Lungs/Pleura: Lungs well expanded bilaterally without significant consolidation, pleural effusion, pneumothorax, or mass.  There are 3 sub solid pulmonary nodules with the largest on the right located within the apical segment of the right upper lobe measuring approximately 0.7 cm (axial series 3, image 33) and the largest on the left located within the posterior apical segment of the left upper lobe measuring approximately 0.9 cm (axial series 3, image 54).  For multiple sub solid nodules with any ?6 mm, Fleischner Society 2017 guidelines  recommend short term follow up non-contrast chest CT in 3-6 months, as these may be secondary to infectious etiology. If these findings persist at that time, subsequent management should be based on the most suspicious nodule.    Esophagus: Normal.    Upper Abdomen: Borderline splenomegaly, otherwise no significant abnormality involving the partially visualized upper abdominal structures.    Bones: No acute fracture. No suspicious lytic or sclerotic lesions.      Impression:       Three sub-solid pulmonary nodules with the largest on the right located within the apical segment of the right upper lobe measuring approximately 0.7 cm (axial series 3, image 33) and the largest on the left located within the posterior apical segment of the left upper lobe measuring approximately 0.9 cm (axial series 3, image 54).  For multiple sub solid nodules with any ?6 mm, Fleischner Society 2017 guidelines recommend short term follow up non-contrast chest CT in 3-6 months, as these may be secondary to infectious etiology. If these findings persist at that time, subsequent management should be based on the most suspicious nodule.    Borderline splenomegaly.    This report was flagged in Epic as abnormal.    Electronically signed by resident: Олег Vila  Date: 10/02/2018  Time: 13:20    Electronically signed by: Rupal Alvarado MD  Date: 10/02/2018  Time: 15:17          Imaging Results                   CT Soft Tissue Neck With Contrast (Final result)     Abnormal  Result time 09/30/18 23:16:13                Final result by Han Kruse MD (09/30/18 23:16:13)                           Impression:        Sub solid nodules at both apices measuring 6.5 mm at the right apex and 7.6 mm at the left apex. For multiple sub solid nodules with any ?6 mm, Fleischner Society 2017 guidelines recommend short term follow up non-contrast chest CT in 3-6 months, as these may be secondary to infectious etiology. If these findings persist at that  time, subsequent management should be based on the most suspicious nodule.     Over the left occipital scalp region there is some skin thickening and subcutaneous fat stranding as well as a 5 mm lymph node.  Findings could be related to cellulitis and/or postsurgical change from recent incision and drainage.  No drainable abscess collection identified.     Right maxillary sinus mucous retention cyst.     This report was flagged in Epic as abnormal.        Electronically signed by:     Han Kruse MD  Date:                                            09/30/2018  Time:                                            23:16                         Narrative:     EXAMINATION:  CT SOFT TISSUE NECK WITH CONTRAST     CLINICAL HISTORY:  Mass or lump, skull base;     TECHNIQUE:  Low dose axial images as well as sagittal and coronal reconstructions were performed from the skull base to the clavicles following the intravenous administration of 100 mL of Omnipaque 350.     COMPARISON:  None.     FINDINGS:  Skull Base and Brain (limited evaluation): No obvious abnormality.     Sinuses and Mastoid Air Cells: 2.4 cm mucous retention cyst right maxillary antrum.  Remaining visualized paranasal sinuses and mastoid air cells appear clear.     Salivary Glands: Some fatty involution of the right parotid gland.  Salivary glands otherwise appear unremarkable.     Thyroid: Normal.     Cervical Lymph Nodes: No pathologic enlargement.     Pharynx/Larynx: Normal, with preserved fat planes.     Vasculature (limited evaluation): No obvious filling defect.     Upper Airways and Lungs: Sub solid nodules at both apices measuring 6.5 mm at the right apex and 7.6 mm at the left apex.     Bones: No acute fracture or suspicious lytic or sclerotic lesion.  Over the left occipital scalp region there is some skin thickening and subcutaneous fat stranding as well as a 5 mm lymph node.  No drainable abscess collection identified.                                           Procedures: none     Consultants:   Consults (From admission, onward)         Status Ordering Provider       Inpatient consult to Endocrinology  Once     Provider:  (Not yet assigned)    Completed CM OSUNA       Inpatient consult to Infectious Diseases  Once     Provider:  (Not yet assigned)    Completed RICHA GONZALES             Medications:  Reconciled Home Medications:            Medication List        START taking these medications    clindamycin 300 MG capsule  Commonly known as:  CLEOCIN  Take 1 capsule (300 mg total) by mouth every 6 (six) hours. for 10 days          CONTINUE taking these medications    INSULIN PUMP CARTRIDGE SUBQ  Inject into the skin.      losartan 50 MG tablet  Commonly known as:  COZAAR  Take 50 mg by mouth once daily.      sulfamethoxazole-trimethoprim 800-160mg 800-160 mg Tab  Commonly known as:  BACTRIM DS  Take 1 tablet by mouth 2 (two) times daily. for 7 days          STOP taking these medications    promethazine 25 MG suppository  Commonly known as:  PHENERGAN                Discharge Instructions:      Discharge Procedure Orders   Ambulatory Referral to Infectious Disease   Referral Priority: Routine Referral Type: Consultation   Referral Reason: Specialty Services Required   Requested Specialty: Infectious Diseases   Number of Visits Requested: 1          Ambulatory Referral to Internal Medicine   Referral Priority: Routine Referral Type: Consultation   Referral Reason: Specialty Services Required   Requested Specialty: Internal Medicine   Number of Visits Requested: 1      Diet diabetic      Notify your health care provider if you experience any of the following:  temperature >100.4      Notify your health care provider if you experience any of the following:  persistent nausea and vomiting or diarrhea      Notify your health care provider if you experience any of the following:  difficulty breathing or increased cough      Notify your health care provider  if you experience any of the following:  persistent dizziness, light-headedness, or visual disturbances      Activity as tolerated         Discharge Condition: good     Disposition:  Home or Self Care     Indwelling Lines/Drains at time of discharge: none     Tests pending at the time of discharge: none      Time spent on the discharge of the patient including review of hospital course with the patient, reviewing discharge medications and arranging follow-up care: 45 minutes.     Discharge examination Patient was seen and examined on 10/3/2018 and determined to be suitable for discharge.     Discharge plan and follow up:      Follow-up Information      Apryl Rao PA-C On 10/17/2018.    Specialty:  Infectious Diseases  Why:  11:30 with infectious disease  Contact information:  [address]  [facility] [address]  [phone]                 [facility] [facility] On 10/12/2018.    Specialty:  [facility]  Why:  12:30 pm  Contact information:  [address]  [facility] [id number] [phone]  Additional information:  [facility]                          Future Appointments   Date Time Provider Department Center   10/12/2018 12:30 PM SHAUN Peres [facility] IM[facility] [address] [facility]   10/17/2018 11:30 AM Apryl Rao PA-C [facility] ID [address]         Provider  Caroline Mosquera MD  Department of Hospital Medicine  [facility] [address]             Electronically signed by Caroline Mosquera MD at 10/4/2018  5:36 PM       ED to Hosp-Admission (Discharged) on 9/30/2018            Detailed Report           Note shared with patient     _______________________________________    Today:  Clay presents to  today for hospital follow up.   Since most recent discharge, does not endorse fever, chills, sob, coughing, headaches, dizziness, joint discomforts, or pain.     Review of Systems:  Eyes: denies visual  changes at this time denies floaters   ENT: no nasal congestion or sore throat  Respiratory: no cough or shorness of breath  Cardiovascular: no chest pain or palpitations  Gastrointestinal: no nausea or vomiting, no abdominal pain or change in bowel habits  Genitourinary: no hematuria or dysuria; denies frequency  Hematologic/Lymphatic: no easy bruising or lymphadenopathy  Musculoskeletal: no arthralgias or myalgias  Neurological: no seizures or tremors  Endocrine: no heat or cold intolerance      PAST HISTORY:     Past Medical History:   Diagnosis Date    Diabetes mellitus type I     Hypertension        No past surgical history on file.    No family history on file.    Social History     Socioeconomic History    Marital status:      Spouse name: Not on file    Number of children: Not on file    Years of education: Not on file    Highest education level: Not on file   Social Needs    Financial resource strain: Not on file    Food insecurity - worry: Not on file    Food insecurity - inability: Not on file    Transportation needs - medical: Not on file    Transportation needs - non-medical: Not on file   Occupational History    Not on file   Tobacco Use    Smoking status: Never Smoker    Smokeless tobacco: Never Used   Substance and Sexual Activity    Alcohol use: Yes     Comment: occas, none recently    Drug use: No    Sexual activity: Not on file   Other Topics Concern    Not on file   Social History Narrative    Not on file       MEDICATIONS & ALLERGIES:     Current Outpatient Medications on File Prior to Visit   Medication Sig Dispense Refill    clindamycin (CLEOCIN) 300 MG capsule Take 1 capsule (300 mg total) by mouth every 6 (six) hours. for 10 days 40 capsule 0    INSULIN PUMP CARTRIDGE SUBQ Inject into the skin.      losartan (COZAAR) 50 MG tablet Take 50 mg by mouth once daily.       No current facility-administered medications on file prior to visit.         Review of patient's  allergies indicates:   Allergen Reactions    Codeine Nausea And Vomiting       OBJECTIVE:     Vital Signs:  There were no vitals filed for this visit.  Wt Readings from Last 1 Encounters:   10/01/18 0651 134.8 kg (297 lb 1.1 oz)   10/01/18 0111 133.8 kg (295 lb)   09/30/18 2013 133.8 kg (295 lb)     There is no height or weight on file to calculate BMI.   Wt Readings from Last 3 Encounters:   10/12/18 (!) 140.5 kg (309 lb 11.9 oz)   10/01/18 134.8 kg (297 lb 1.1 oz)   09/29/18 133.8 kg (295 lb)     Temp Readings from Last 3 Encounters:   10/03/18 98.2 °F (36.8 °C)   09/29/18 98.8 °F (37.1 °C) (Oral)   11/09/17 98.1 °F (36.7 °C) (Oral)     BP Readings from Last 3 Encounters:   10/12/18 110/76   10/03/18 (!) 132/56   09/29/18 137/85     Pulse Readings from Last 3 Encounters:   10/12/18 82   10/03/18 71   09/29/18 99         Physical Exam:  General: Well developed, well nourished. No distress.  HEENT: Head is normocephalic, atraumatic; ears are normal.   Eyes: Clear conjunctiva.  Neck: Supple, symmetrical neck; trachea midline.  Lungs: Clear to auscultation bilaterally and normal respiratory effort.  Cardiovascular: Heart with regular rate and rhythm. No murmurs, gallops or rubs  Extremities: No LE edema. Pulses 2+ and symmetric.   Abdomen: Abdomen is soft, non-tender non-distended with normal bowel sounds.  Skin: Skin color, texture, turgor normal. No rashes.   No appreciable edema or erythema to posterior neck region.   Musculoskeletal: Normal gait.   Lymph Nodes: No cervical or supraclavicular adenopathy.  Neurologic: Normal strength and tone. No focal numbness or weakness.   Psychiatric: Not depressed.        Laboratory  Lab Results   Component Value Date    WBC 7.58 10/03/2018    HGB 14.9 10/03/2018    HCT 44.8 10/03/2018    MCV 82 10/03/2018     10/03/2018     BMP  Lab Results   Component Value Date     10/03/2018    K 3.6 10/03/2018     10/03/2018    CO2 27 10/03/2018    BUN 10 10/03/2018     CREATININE 0.8 10/03/2018    CALCIUM 9.4 10/03/2018    ANIONGAP 10 10/03/2018    ESTGFRAFRICA >60.0 10/03/2018    EGFRNONAA >60.0 10/03/2018     Lab Results   Component Value Date    ALT 12 11/09/2017    AST 14 11/09/2017    ALKPHOS 103 11/09/2017    BILITOT 0.8 11/09/2017     Lab Results   Component Value Date    INR 1.0 09/30/2018     Lab Results   Component Value Date    HGBA1C 10.4 (H) 09/30/2018     No results for input(s): POCTGLUCOSE in the last 72 hours.    CT Chest Without Contrast [[id number]] (Abnormal) Resulted: 10/02/18 1517   Order Status: Completed Updated: 10/02/18 1519   Narrative:     EXAMINATION:  CT CHEST WITHOUT CONTRAST    CLINICAL HISTORY:  Sub solid nodules - r/o infectious etiology;    TECHNIQUE:  Low dose axial images, sagittal and coronal reformations were obtained from the thoracic inlet to the lung bases. Contrast was not administered.    COMPARISON:  CT soft tissue neck 09/30/2018    FINDINGS:  Base of Neck: No significant abnormality.    Thoracic soft tissues: Normal.    Aorta: Left-sided aortic arch.  No aneurysm and no significant atherosclerosis    Heart: Normal size. No effusion.    Pulmonary vasculature: Pulmonary arteries distribute normally.  There are four pulmonary veins.    Jessie/Mediastinum: No lymphadenopathy noted on this noncontrast exam.    Airways: Trachea midline with proximal airways patent.    Lungs/Pleura: Lungs well expanded bilaterally without significant consolidation, pleural effusion, pneumothorax, or mass.  There are 3 sub solid pulmonary nodules with the largest on the right located within the apical segment of the right upper lobe measuring approximately 0.7 cm (axial series 3, image 33) and the largest on the left located within the posterior apical segment of the left upper lobe measuring approximately 0.9 cm (axial series 3, image 54).  For multiple sub solid nodules with any ?6 mm, Fleischner Society 2017 guidelines recommend short term follow up  non-contrast chest CT in 3-6 months, as these may be secondary to infectious etiology. If these findings persist at that time, subsequent management should be based on the most suspicious nodule.    Esophagus: Normal.    Upper Abdomen: Borderline splenomegaly, otherwise no significant abnormality involving the partially visualized upper abdominal structures.    Bones: No acute fracture. No suspicious lytic or sclerotic lesions.      Impression:       Three sub-solid pulmonary nodules with the largest on the right located within the apical segment of the right upper lobe measuring approximately 0.7 cm (axial series 3, image 33) and the largest on the left located within the posterior apical segment of the left upper lobe measuring approximately 0.9 cm (axial series 3, image 54).  For multiple sub solid nodules with any ?6 mm, Fleischner Society 2017 guidelines recommend short term follow up non-contrast chest CT in 3-6 months, as these may be secondary to infectious etiology. If these findings persist at that time, subsequent management should be based on the most suspicious nodule.    Borderline splenomegaly.    This report was flagged in Epic as abnormal.    Electronically signed by resident: Олег Vila  Date: 10/02/2018  Time: 13:20    Electronically signed by: Rupal Alvarado MD  Date: 10/02/2018  Time: 15:17           TRANSITION OF CARE:     [facility] Contact Note:  10/5/2018    Family and/or Caretaker present at visit?  Yes.  Diagnostic tests reviewed/disposition: I have reviewed all completed as well as pending diagnostic tests at the time of discharge.  Disease/illness education:    Home health/community services discussion/referrals: Patient does not have home health established from hospital visit.  They do not need home health.  If needed, we will set up home health for the patient.   Establishment or re-establishment of referral orders for community resources: No other necessary community resources.    Discussion with other health care providers: No discussion with other health care providers necessary.     Transition of Care Visit:     I have reviewed and updated the history and problem list.  I have reconciled the medication list.  I have discussed the hospitalization and current medical issues, prognosis and plans with the patient/family.  I  spent more than 50% of time discussing the care with the patient/family.  Total Encounter in the [facility]: 60 minutes.    Medications Reconciliation:   I have reconciled the patient's home medications and discharge medications with the patient/family. I have updated all changes.  Refer to After-Visit Medication List.    ASSESSMENT & PLAN:     HIGH RISK CONDITION(S):        Recent admission for Cellulitis of Posterior Left Neck Region, in the setting of Diabetic Type I:  Resolving / Improving without complications  *S/p I and D   *Seen by ID during admission   - continue Cleocin till 10/13  - follow up with ID in 2-3 weeks (apt: 10/17)  Home Instructions:   Eat 1 cup of yogurt (Activia, Greek, Togolese or Kefir) 1 serving twice daily for 10 days to prevent antibiotic associated diarrhea or yeast infection.          Incidental Notations to Multiple Lung Nodules on CT during most recent admission (?? Etiology):   *Per Fleischner Society 2017 guidelines,  recommend follow up non-contrast chest CT in 3-6 months  - will follow up with ID for Cellulitic process in a Type I DM   - apt with Pulmonary (apt 11/14) for their expert input on the case; ? Infectious etiology        Diabetes Mellitus, type I:   (Diagnosed at age 9)  Insulin Pump   *Most recent A1c: 10.4%, reflective of Poor OP control   *Followed by Dr. Hernandez at [facility] (10/31); defer to his Endocrinologist           Hypertension:   *Goal: < 130/90  Today: 110/76 (controlled)  - continue Losartan 50      Immunizations:   (up to date)  Flu Vaccine: 10/2018  TDap: 1/17/2017      *Will establish care with our PCP  clinic, as he has requested on 11/3.       [facility] Visit (Post Discharge Follow-up) Today:   - Our clinic physicians and nurses plan to follow the patient up for any medical issues in the [facility] for 30 days post discharge.    Future Appointments   Date Time Provider Department Center   10/17/2018 11:30 AM Apryl Rao PA-C [facility] ID [address]   11/2/2018  1:00 PM Forest Monroe MD [facility] IM [address] [facility]   11/14/2018  9:00 AM Ranjana Nam MD [facility] [address]        Medication List           Accurate as of 10/12/18  1:19 PM. If you have any questions, ask your nurse or doctor.               CONTINUE taking these medications    clindamycin 300 MG capsule  Commonly known as:  CLEOCIN  Take 1 capsule (300 mg total) by mouth every 6 (six) hours. for 10 days     INSULIN PUMP CARTRIDGE SUBQ     losartan 50 MG tablet  Commonly known as:  COZAAR            Signing Physician:  SHAUN Schwab

## 2018-10-12 ENCOUNTER — OFFICE VISIT (OUTPATIENT)
Dept: PRIMARY CARE CLINIC | Facility: CLINIC | Age: 38
End: 2018-10-12
Payer: COMMERCIAL

## 2018-10-12 VITALS
HEART RATE: 82 BPM | OXYGEN SATURATION: 96 % | DIASTOLIC BLOOD PRESSURE: 76 MMHG | BODY MASS INDEX: 41.95 KG/M2 | WEIGHT: 309.75 LBS | SYSTOLIC BLOOD PRESSURE: 110 MMHG | HEIGHT: 72 IN

## 2018-10-12 DIAGNOSIS — E10.65 TYPE 1 DIABETES MELLITUS WITH HYPERGLYCEMIA: ICD-10-CM

## 2018-10-12 DIAGNOSIS — R91.8 LUNG NODULE, MULTIPLE: ICD-10-CM

## 2018-10-12 DIAGNOSIS — L03.221 CELLULITIS OF NECK: Primary | ICD-10-CM

## 2018-10-12 DIAGNOSIS — I10 ESSENTIAL HYPERTENSION: ICD-10-CM

## 2018-10-12 PROCEDURE — 99999 PR PBB SHADOW E&M-EST. PATIENT-LVL IV: CPT | Mod: PBBFAC,,, | Performed by: NURSE PRACTITIONER

## 2018-10-12 PROCEDURE — 99495 TRANSJ CARE MGMT MOD F2F 14D: CPT | Mod: S$GLB,,, | Performed by: NURSE PRACTITIONER

## 2018-10-12 NOTE — PATIENT INSTRUCTIONS
[facility] Visit (Post Discharge Follow-up) Today:   - Our clinic physicians and nurses plan to follow the patient up for any medical issues in the [facility] for 30 days post discharge.    Future Appointments   Date Time Provider Department Center   10/17/2018 11:30 AM Apryl Rao PA-C [facility] ID [address]   11/2/2018  1:00 PM Forest Monroe MD [facility] IM [address] PCW   11/14/2018  9:00 AM Ranjana Nam MD [facility] [address]     Eat 1 cup of yogurt (Activia, Greek, Latvian or Kefir) 1 serving twice daily for 10 days to prevent antibiotic associated diarrhea or yeast infection.

## 2018-10-17 ENCOUNTER — OFFICE VISIT (OUTPATIENT)
Dept: INFECTIOUS DISEASES | Facility: CLINIC | Age: 38
End: 2018-10-17
Payer: COMMERCIAL

## 2018-10-17 VITALS
HEIGHT: 72 IN | DIASTOLIC BLOOD PRESSURE: 87 MMHG | HEART RATE: 73 BPM | WEIGHT: 308 LBS | SYSTOLIC BLOOD PRESSURE: 131 MMHG | TEMPERATURE: 99 F | BODY MASS INDEX: 41.72 KG/M2

## 2018-10-17 DIAGNOSIS — L03.221 CELLULITIS OF NECK: Primary | ICD-10-CM

## 2018-10-17 DIAGNOSIS — R91.8 LUNG NODULE, MULTIPLE: ICD-10-CM

## 2018-10-17 PROCEDURE — 99213 OFFICE O/P EST LOW 20 MIN: CPT | Mod: S$GLB,,, | Performed by: PHYSICIAN ASSISTANT

## 2018-10-17 PROCEDURE — 3008F BODY MASS INDEX DOCD: CPT | Mod: CPTII,S$GLB,, | Performed by: PHYSICIAN ASSISTANT

## 2018-10-17 PROCEDURE — 99999 PR PBB SHADOW E&M-EST. PATIENT-LVL III: CPT | Mod: PBBFAC,,, | Performed by: PHYSICIAN ASSISTANT

## 2018-10-17 RX ORDER — INSULIN ASPART 100 [IU]/ML
INJECTION, SOLUTION INTRAVENOUS; SUBCUTANEOUS
COMMUNITY
Start: 2018-09-12 | End: 2020-09-09 | Stop reason: SDUPTHER

## 2018-10-17 RX ORDER — NABUMETONE 750 MG/1
TABLET, FILM COATED ORAL
COMMUNITY
Start: 2018-10-04 | End: 2018-10-17

## 2018-10-17 RX ORDER — CETIRIZINE HYDROCHLORIDE 10 MG/1
10 TABLET ORAL DAILY
COMMUNITY
End: 2022-02-16

## 2018-10-17 NOTE — PROGRESS NOTES
Subjective:      Patient ID: Clay Waters is a 38 y.o. male.    Chief Complaint:Hospital Follow Up      History of Present Illness  Clay Waters is a 37 yo male with history of diabetes and folliculitis recently admitted to the hospital for cellulitis on his posterior neck and posterior scalp secondary to a plugged hair follicle s/p I&D. He received IV Clindamycin in the hospital with improvement in his symptoms. Blood cultures were NGTD. He incidentally was found to have pulmonary nodules on CT, largest ones are BL apical, 0.9 and 0.7 cm. He works at [employer]. No exposure to fumes, never smoked. He has not been abroad, never incarcerated, no IVDU. Patient has an allergic history and reports asthma during childhood. Currently denies cough, hemoptysis, SOB. No recent weight loss. Does have a dog, no exposure to birds. He was discharged on oral clindamycin. He completed antibiotics on Saturday. Denies fevers, chills, sweats at home. His cellulitis has significantly improved/nearly resolved. No neck pain with ROM/decreased ROM of neck. No swollen LNs. He has follow up with pulmonology next month.      Review of Systems   Constitution: Negative for chills, decreased appetite, fever, weakness, malaise/fatigue, night sweats, weight gain and weight loss.   HENT: Negative for congestion, ear pain, hearing loss, hoarse voice, sore throat and tinnitus.    Eyes: Negative for blurred vision, redness and visual disturbance.   Cardiovascular: Negative for chest pain, leg swelling and palpitations.   Respiratory: Negative for cough, hemoptysis, shortness of breath and sputum production.    Hematologic/Lymphatic: Negative for adenopathy. Does not bruise/bleed easily.   Skin: Negative for dry skin, itching, rash and suspicious lesions.   Musculoskeletal: Negative for back pain, joint pain, myalgias and neck pain.   Gastrointestinal: Negative for abdominal pain, constipation, diarrhea, heartburn, nausea and vomiting.    Genitourinary: Negative for dysuria, flank pain, frequency, hematuria, hesitancy and urgency.   Neurological: Negative for dizziness, headaches, numbness and paresthesias.   Psychiatric/Behavioral: Negative for depression and memory loss. The patient does not have insomnia and is not nervous/anxious.      Objective:   Physical Exam   Constitutional: He is oriented to person, place, and time. He appears well-developed and well-nourished. No distress.   HENT:   Head: Normocephalic and atraumatic.   Mouth/Throat: No oropharyngeal exudate.   Eyes: Conjunctivae are normal. No scleral icterus.   Neck: Normal range of motion. Neck supple.   Cardiovascular: Normal rate, regular rhythm and normal heart sounds.   Pulmonary/Chest: Effort normal. No stridor. No respiratory distress. He has no wheezes.   Musculoskeletal: Normal range of motion. He exhibits no edema.   Lymphadenopathy:     He has no cervical adenopathy.   Neurological: He is alert and oriented to person, place, and time. No cranial nerve deficit.   Skin: Skin is warm and dry. No rash noted. He is not diaphoretic.   Posterior scalp cellulitis resolved. Small papule remaining. Non tender. No drainage. (see picture)   Psychiatric: He has a normal mood and affect. His behavior is normal. Thought content normal.            Assessment:       1. Cellulitis of neck    2. Lung nodule, multiple        Plan:   - Patient has completed a full course of Clindamycin with resolution of his cellulitis. Will monitor off antibiotics. Encouraged patient to avoid shaving his hair.   - Follow up with pulmonology as scheduled for evaluation of his pulmonary nodules  - RTC as needed. Advised patient to call for fevers, chills, sweats, or increase in neck pain, swelling or new wound.

## 2018-10-17 NOTE — LETTER
October 17, 2018      Mary Jane Chakraborty MD  [address] [facility] [address]           [facility] - Infectious [facility]  [address]  [place] [address]  Phone: [phone]  Fax: [phone]          Patient: Clay Waters   MR Number: [id number]   YOB: 1980   Date of Visit: 10/17/2018       Dear Dr. Mary Jane Chakraborty:    Thank you for referring Clay Waters to me for evaluation. Attached you will find relevant portions of my assessment and plan of care.    If you have questions, please do not hesitate to call me. I look forward to following Clay Waters along with you.    Sincerely,    Apryl Rao PA-C    Enclosure  CC:  No Recipients    If you would like to receive this communication electronically, please contact [email or web address] or [phone] to request more information on [employer] Link access.    For providers and/or their staff who would like to refer a patient to [facility], please contact us through our one-stop-shop provider referral line, [facility], at [phone].    If you feel you have received this communication in error or would no longer like to receive these types of communications, please e-mail [email or web address]

## 2018-11-14 ENCOUNTER — OFFICE VISIT (OUTPATIENT)
Dept: PULMONOLOGY | Facility: CLINIC | Age: 38
End: 2018-11-14
Payer: COMMERCIAL

## 2018-11-14 VITALS
OXYGEN SATURATION: 98 % | SYSTOLIC BLOOD PRESSURE: 132 MMHG | BODY MASS INDEX: 41.38 KG/M2 | HEART RATE: 95 BPM | DIASTOLIC BLOOD PRESSURE: 84 MMHG | WEIGHT: 305.13 LBS

## 2018-11-14 DIAGNOSIS — R91.1 LUNG NODULE: ICD-10-CM

## 2018-11-14 PROCEDURE — 3008F BODY MASS INDEX DOCD: CPT | Mod: CPTII,S$GLB,, | Performed by: INTERNAL MEDICINE

## 2018-11-14 PROCEDURE — 99204 OFFICE O/P NEW MOD 45 MIN: CPT | Mod: S$GLB,,, | Performed by: INTERNAL MEDICINE

## 2018-11-14 PROCEDURE — 99999 PR PBB SHADOW E&M-EST. PATIENT-LVL III: CPT | Mod: PBBFAC,,, | Performed by: INTERNAL MEDICINE

## 2018-11-14 RX ORDER — ALBUTEROL SULFATE 90 UG/1
2 AEROSOL, METERED RESPIRATORY (INHALATION) EVERY 6 HOURS PRN
Qty: 1 INHALER | Refills: 12 | Status: SHIPPED | OUTPATIENT
Start: 2018-11-14 | End: 2020-01-13

## 2018-11-14 NOTE — PROGRESS NOTES
Subjective:       Patient ID: Clay Waters is a 38 y.o. male.    Chief Complaint: Lung Nodules    HPI Clay Waters 38 y.o. male    has a past medical history of Diabetes mellitus type I and Hypertension.    has no past surgical history on file.   reports that  has never smoked. he has never used smokeless tobacco. He reports that he drinks alcohol. He reports that he does not use drugs.  Referred by: Skye Craig  Who had concerns including Lung Nodules.  The patient's last visit with me was on Visit date not found.    Doing well, no complaints  Had neck staph infection  Ct neck showed lung nodules therefore full ct chest done  Patient finished abx, now working out, no issues  No fever chills, ns, wt changes, nausea, vomiting, diarrhea, constipation, chest pain, tightness, pressure    Review of Systems   All other systems reviewed and are negative.      Objective:      Physical Exam   Constitutional: He is oriented to person, place, and time. He appears well-developed and well-nourished. He appears not cachectic. No distress.   HENT:   Head: Normocephalic.   Nose: Nose normal. No mucosal edema.   Mouth/Throat: Oropharynx is clear and moist. Normal dentition. No oropharyngeal exudate.   Neck: Normal range of motion. Neck supple.   Cardiovascular: Normal rate, regular rhythm, normal heart sounds and intact distal pulses. Exam reveals no gallop and no friction rub.   No murmur heard.  Pulmonary/Chest: Effort normal and breath sounds normal. No stridor.   Abdominal: Soft. Bowel sounds are normal. He exhibits no distension.   Musculoskeletal: Normal range of motion. He exhibits no edema or tenderness.   Lymphadenopathy: No supraclavicular adenopathy is present.     He has no cervical adenopathy.   Neurological: He is alert and oriented to person, place, and time. Gait normal.   Skin: Skin is warm and dry. No rash noted. He is not diaphoretic. No cyanosis or erythema. No pallor. Nails show no  clubbing.   Psychiatric: He has a normal mood and affect. His behavior is normal. Judgment and thought content normal.   Nursing note and vitals reviewed.    Personal Diagnostic Review    No flowsheet data found.      Assessment:       1. Lung nodule        Outpatient Encounter Medications as of 11/14/2018   Medication Sig Dispense Refill    cetirizine (ZYRTEC) 10 MG tablet Take 10 mg by mouth once daily.      INSULIN PUMP CARTRIDGE SUBQ Inject into the skin.      losartan (COZAAR) 50 MG tablet Take 50 mg by mouth once daily.      NOVOLOG U-100 INSULIN ASPART 100 unit/mL injection       albuterol (PROVENTIL/VENTOLIN HFA) 90 mcg/actuation inhaler Inhale 2 puffs into the lungs every 6 (six) hours as needed for Wheezing or Shortness of Breath. 1 Inhaler 12     No facility-administered encounter medications on file as of 11/14/2018.      Orders Placed This Encounter   Procedures    CT Chest Without Contrast     Standing Status:   Future     Standing Expiration Date:   11/14/2019       Plan:            I personally reviewed the      1. CT chest   2. CT chest report     Assessment:  Clay was seen today for lung nodules.    Diagnoses and all orders for this visit:    Lung nodule  -     CT Chest Without Contrast; Future    Other orders  -     albuterol (PROVENTIL/VENTOLIN HFA) 90 mcg/actuation inhaler; Inhale 2 puffs into the lungs every 6 (six) hours as needed for Wheezing or Shortness of Breath.        Plan:  Problem List Items Addressed This Visit     Lung nodule    Overview     <1cm multiple lung nodules in the setting of staph infection  LTNS  No history of cancer         Current Assessment & Plan     Repeat 3 month ct to monitor         Relevant Orders    CT Chest Without Contrast          Patient with history of asthma  Given albuterol inhaler rx today- to use 15minutes prior to exercise  If still having sx will check pfts end of December      No Follow-up on file.    There are no Patient Instructions on  file for this visit.    Immunization History   Administered Date(s) Administered    Influenza - Quadrivalent - PF 10/03/2018

## 2018-11-14 NOTE — LETTER
November 14, 2018      Skye Craig, [facility]  [address] [facility] [address]           [facility]  [address]  [place] [address]  Phone: [phone]          Patient: Clay Waters   MR Number: [id number]   YOB: 1980   Date of Visit: 11/14/2018       Dear Skye Craig:    Thank you for referring Clay Waters to me for evaluation. Attached you will find relevant portions of my assessment and plan of care.    If you have questions, please do not hesitate to call me. I look forward to following Clay Waters along with you.    Sincerely,    Ranjana Nam MD    Enclosure  CC:  No Recipients    If you would like to receive this communication electronically, please contact [email or web address] or [phone] to request more information on [employer] Link access.    For providers and/or their staff who would like to refer a patient to [facility], please contact us through our one-stop-shop provider referral line, [facility], at [phone].    If you feel you have received this communication in error or would no longer like to receive these types of communications, please e-mail [email or web address]

## 2018-12-26 ENCOUNTER — HOSPITAL ENCOUNTER (OUTPATIENT)
Dept: RADIOLOGY | Facility: HOSPITAL | Age: 38
Discharge: HOME | End: 2018-12-26
Attending: INTERNAL MEDICINE
Payer: COMMERCIAL

## 2018-12-26 DIAGNOSIS — R91.1 LUNG NODULE: ICD-10-CM

## 2018-12-26 PROCEDURE — 71250 CT THORAX DX C-: CPT | Mod: TC

## 2018-12-26 PROCEDURE — 71250 CT THORAX DX C-: CPT | Mod: 26,,, | Performed by: RADIOLOGY

## 2018-12-28 ENCOUNTER — PATIENT MESSAGE (OUTPATIENT)
Dept: PULMONOLOGY | Facility: CLINIC | Age: 38
End: 2018-12-28

## 2019-01-29 DIAGNOSIS — R91.8 ABNORMAL FINDINGS ON DIAGNOSTIC IMAGING OF LUNG: ICD-10-CM

## 2019-07-10 ENCOUNTER — OFFICE VISIT (OUTPATIENT)
Dept: URGENT CARE | Facility: CLINIC | Age: 39
End: 2019-07-10
Payer: COMMERCIAL

## 2019-07-10 VITALS
DIASTOLIC BLOOD PRESSURE: 78 MMHG | SYSTOLIC BLOOD PRESSURE: 122 MMHG | HEIGHT: 72 IN | HEART RATE: 84 BPM | OXYGEN SATURATION: 97 % | TEMPERATURE: 98 F | RESPIRATION RATE: 18 BRPM | WEIGHT: 284 LBS | BODY MASS INDEX: 38.47 KG/M2

## 2019-07-10 DIAGNOSIS — E10.65 TYPE 1 DIABETES MELLITUS WITH HYPERGLYCEMIA: ICD-10-CM

## 2019-07-10 DIAGNOSIS — S90.129A CONTUSION OF FOOT INCLUDING TOES, INITIAL ENCOUNTER: Primary | ICD-10-CM

## 2019-07-10 DIAGNOSIS — S99.921A RIGHT FOOT INJURY, INITIAL ENCOUNTER: ICD-10-CM

## 2019-07-10 DIAGNOSIS — S90.30XA CONTUSION OF FOOT INCLUDING TOES, INITIAL ENCOUNTER: Primary | ICD-10-CM

## 2019-07-10 PROCEDURE — 3078F PR MOST RECENT DIASTOLIC BLOOD PRESSURE < 80 MM HG: ICD-10-PCS | Mod: CPTII,S$GLB,, | Performed by: NURSE PRACTITIONER

## 2019-07-10 PROCEDURE — 3078F DIAST BP <80 MM HG: CPT | Mod: CPTII,S$GLB,, | Performed by: NURSE PRACTITIONER

## 2019-07-10 PROCEDURE — 3074F SYST BP LT 130 MM HG: CPT | Mod: CPTII,S$GLB,, | Performed by: NURSE PRACTITIONER

## 2019-07-10 PROCEDURE — 99214 PR OFFICE/OUTPT VISIT, EST, LEVL IV, 30-39 MIN: ICD-10-PCS | Mod: S$GLB,,, | Performed by: NURSE PRACTITIONER

## 2019-07-10 PROCEDURE — 3008F PR BODY MASS INDEX (BMI) DOCUMENTED: ICD-10-PCS | Mod: CPTII,S$GLB,, | Performed by: NURSE PRACTITIONER

## 2019-07-10 PROCEDURE — 3008F BODY MASS INDEX DOCD: CPT | Mod: CPTII,S$GLB,, | Performed by: NURSE PRACTITIONER

## 2019-07-10 PROCEDURE — 99214 OFFICE O/P EST MOD 30 MIN: CPT | Mod: S$GLB,,, | Performed by: NURSE PRACTITIONER

## 2019-07-10 PROCEDURE — 73630 X-RAY EXAM OF FOOT: CPT | Mod: RT,S$GLB,, | Performed by: RADIOLOGY

## 2019-07-10 PROCEDURE — 3074F PR MOST RECENT SYSTOLIC BLOOD PRESSURE < 130 MM HG: ICD-10-PCS | Mod: CPTII,S$GLB,, | Performed by: NURSE PRACTITIONER

## 2019-07-10 PROCEDURE — 73630 XR FOOT COMPLETE 3 VIEW RIGHT: ICD-10-PCS | Mod: RT,S$GLB,, | Performed by: RADIOLOGY

## 2019-07-10 PROCEDURE — 3046F PR MOST RECENT HEMOGLOBIN A1C LEVEL > 9.0%: ICD-10-PCS | Mod: CPTII,S$GLB,, | Performed by: NURSE PRACTITIONER

## 2019-07-10 PROCEDURE — 3046F HEMOGLOBIN A1C LEVEL >9.0%: CPT | Mod: CPTII,S$GLB,, | Performed by: NURSE PRACTITIONER

## 2019-07-10 NOTE — PROGRESS NOTES
Subjective:       Patient ID: Clay Waters is a 39 y.o. male.    Vitals:  height is 6' (1.829 m) and weight is 128.8 kg (284 lb). His tympanic temperature is 97.9 °F (36.6 °C). His blood pressure is 122/78 and his pulse is 84. His respiration is 18 and oxygen saturation is 97%.     Chief Complaint: Trauma    This is a 39 y.o. male who presents today with a chief complaint of   Pt dropped 45lbs weight on rt foot. The 3rd and 4 th toe are bruised and painful.  Pt. Reports that it is difficult to walk on the right foot.  He stands all day at work.  He is diabetic on insulin.     Trauma   The incident occurred 12 to 24 hours ago. The incident occurred at a playground (gym). The injury mechanism was a direct blow. The injury occurred in the context of sports. Restrained: tennis shoes. There is an injury to the right fourth toe and right fifth toe. The pain is moderate. It is unlikely that a foreign body is present. Pertinent negatives include no abdominal pain, light-headedness or loss of consciousness. There have been no prior injuries to these areas. His tetanus status is UTD.       Constitution: Negative for fatigue.   HENT: Negative for facial swelling and facial trauma.    Neck: Negative for neck stiffness.   Cardiovascular: Negative for chest trauma.   Eyes: Negative for eye trauma, double vision and blurred vision.   Gastrointestinal: Negative for abdominal trauma, abdominal pain and rectal bleeding.   Genitourinary: Negative for hematuria, genital trauma and pelvic pain.   Musculoskeletal: Positive for pain, trauma, abnormal ROM of joint and pain with walking. Negative for joint swelling.   Skin: Negative for color change, wound, abrasion and laceration.   Neurological: Negative for dizziness, history of vertigo, light-headedness, coordination disturbances, altered mental status and loss of consciousness.   Hematologic/Lymphatic: Negative for history of bleeding disorder.   Psychiatric/Behavioral:  Negative for altered mental status.       Objective:      Physical Exam   Constitutional: He is oriented to person, place, and time. He appears well-developed and well-nourished. He is cooperative.  Non-toxic appearance. He does not appear ill. No distress.   HENT:   Head: Normocephalic and atraumatic. Head is without abrasion, without contusion and without laceration.   Right Ear: Hearing, tympanic membrane, external ear and ear canal normal. No hemotympanum.   Left Ear: Hearing, tympanic membrane, external ear and ear canal normal. No hemotympanum.   Nose: Nose normal. No mucosal edema, rhinorrhea or nasal deformity. No epistaxis. Right sinus exhibits no maxillary sinus tenderness and no frontal sinus tenderness. Left sinus exhibits no maxillary sinus tenderness and no frontal sinus tenderness.   Mouth/Throat: Uvula is midline, oropharynx is clear and moist and mucous membranes are normal. No trismus in the jaw. Normal dentition. No uvula swelling. No posterior oropharyngeal erythema.   Eyes: Pupils are equal, round, and reactive to light. Conjunctivae, EOM and lids are normal. Right eye exhibits no discharge. Left eye exhibits no discharge. No scleral icterus.   Sclera clear bilat   Neck: Trachea normal, normal range of motion, full passive range of motion without pain and phonation normal. Neck supple. No spinous process tenderness and no muscular tenderness present. No neck rigidity. No tracheal deviation present.   Cardiovascular: Normal rate, regular rhythm, normal heart sounds, intact distal pulses and normal pulses.   Pulses:       Dorsalis pedis pulses are 2+ on the right side, and 2+ on the left side.   Pulmonary/Chest: Effort normal and breath sounds normal. No respiratory distress.   Abdominal: Soft. Normal appearance and bowel sounds are normal. He exhibits no distension, no pulsatile midline mass and no mass. There is no tenderness.   Musculoskeletal: He exhibits no edema or deformity.        Right  foot: There is tenderness and swelling. There is normal range of motion and normal capillary refill.        Feet:    Feet:   Right Foot:   Skin Integrity: Positive for erythema and warmth.   Neurological: He is alert and oriented to person, place, and time. He has normal strength. No cranial nerve deficit or sensory deficit. He exhibits normal muscle tone. He displays no seizure activity. Coordination normal. GCS eye subscore is 4. GCS verbal subscore is 5. GCS motor subscore is 6.   Skin: Skin is warm, dry and intact. Capillary refill takes less than 2 seconds. No abrasion, no bruising, no burn, no ecchymosis and no laceration noted. He is not diaphoretic. No pallor.   Psychiatric: He has a normal mood and affect. His speech is normal and behavior is normal. Judgment and thought content normal. Cognition and memory are normal.   Nursing note and vitals reviewed.        X-ray Foot Complete 3 View Right    Result Date: 7/10/2019  EXAMINATION: XR FOOT COMPLETE 3 VIEW RIGHT CLINICAL HISTORY: . Unspecified injury of right foot, initial encounter TECHNIQUE: AP, lateral, and oblique views of the right foot were performed. COMPARISON: None FINDINGS: Three views. No acute displaced fracture or dislocation of the foot.  No radiopaque foreign body.  No significant edema.     1. No acute displaced fracture or dislocation of the foot. Electronically signed by: Kaz Moore MD Date:    07/10/2019 Time:    18:52    Assessment:       1. Contusion of foot including toes, initial encounter    2. Right foot injury, initial encounter    3. Type 1 diabetes mellitus with hyperglycemia        Plan:         Contusion of foot including toes, initial encounter  -     NON-PNEUMATIC WALKING BOOT FOR HOME USE    Right foot injury, initial encounter  -     X-Ray Foot Complete 3 view Right; Future; Expected date: 07/10/2019  -     NON-PNEUMATIC WALKING BOOT FOR HOME USE    Type 1 diabetes mellitus with hyperglycemia

## 2019-07-11 NOTE — PATIENT INSTRUCTIONS
Return to Urgent Care or go to ER if symptoms worsen or fail to improve.  Follow up with PCP as recommended for further management.       Foot Contusion  You have a contusion. This is also called a bruise. There is swelling and some bleeding under the skin, but no broken bones. This injury generally takes a few days to a few weeks to heal.  During that time, the bruise will typically change in color from reddish, to purple-blue, to greenish-yellow, then to yellow-brown.  Home care  · Elevate the foot to reduce pain and swelling. As much as possible, sit or lie down with the foot raised about the level of your heart. This is especially important during the first 48 hours.  · Ice the foot to help reduce pain and swelling. Wrap a cold source (ice pack or ice cubes in a plastic bag) in a thin towel. Apply to the bruised area for 20 minutes every 1 to 2 hours the first day. Continue this 3 to 4 times a day until the pain and swelling goes away.  · Unless another medicine was prescribed, you can take acetaminophen, ibuprofen, or naproxen to control pain. (If you have chronic liver or kidney disease or ever had a stomach ulcer or gastrointestinal bleeding, talk with your healthcare provider before using these medicines.)  Follow up  Follow up with your healthcare provider or our staff as advised. Call if you are not improving within 1 to 2 weeks.  When to seek medical advice   Call your healthcare provider right away if you have any of the following:  · Increased pain or swelling  · Foot or leg becomes cold, blue, numb or tingly  · Signs of infection: Warmth, drainage, or increased redness or pain around the bruise  · Inability to move the injured foot   · Frequent bruising for unknown reasons  Date Last Reviewed: 2/1/2017  © 5615-5116 [employer] [address] [place] [address], [place] [address]. All rights reserved. This information is not intended as a substitute for professional medical care. Always follow your  healthcare professional's instructions.

## 2019-11-15 ENCOUNTER — OFFICE VISIT (OUTPATIENT)
Dept: URGENT CARE | Facility: CLINIC | Age: 39
End: 2019-11-15
Payer: COMMERCIAL

## 2019-11-15 VITALS
OXYGEN SATURATION: 100 % | HEIGHT: 72 IN | RESPIRATION RATE: 20 BRPM | BODY MASS INDEX: 37.93 KG/M2 | WEIGHT: 280 LBS | SYSTOLIC BLOOD PRESSURE: 138 MMHG | TEMPERATURE: 97 F | HEART RATE: 85 BPM | DIASTOLIC BLOOD PRESSURE: 86 MMHG

## 2019-11-15 DIAGNOSIS — M79.641 HAND PAIN, RIGHT: Primary | ICD-10-CM

## 2019-11-15 PROCEDURE — 99214 OFFICE O/P EST MOD 30 MIN: CPT | Mod: S$GLB,,, | Performed by: FAMILY MEDICINE

## 2019-11-15 PROCEDURE — 99214 PR OFFICE/OUTPT VISIT, EST, LEVL IV, 30-39 MIN: ICD-10-PCS | Mod: S$GLB,,, | Performed by: FAMILY MEDICINE

## 2019-11-15 PROCEDURE — 3079F DIAST BP 80-89 MM HG: CPT | Mod: CPTII,S$GLB,, | Performed by: FAMILY MEDICINE

## 2019-11-15 PROCEDURE — 3008F PR BODY MASS INDEX (BMI) DOCUMENTED: ICD-10-PCS | Mod: CPTII,S$GLB,, | Performed by: FAMILY MEDICINE

## 2019-11-15 PROCEDURE — 3075F SYST BP GE 130 - 139MM HG: CPT | Mod: CPTII,S$GLB,, | Performed by: FAMILY MEDICINE

## 2019-11-15 PROCEDURE — 73110 X-RAY EXAM OF WRIST: CPT | Mod: RT,S$GLB,, | Performed by: RADIOLOGY

## 2019-11-15 PROCEDURE — 73130 XR HAND COMPLETE 3 VIEW RIGHT: ICD-10-PCS | Mod: RT,S$GLB,, | Performed by: RADIOLOGY

## 2019-11-15 PROCEDURE — 3075F PR MOST RECENT SYSTOLIC BLOOD PRESS GE 130-139MM HG: ICD-10-PCS | Mod: CPTII,S$GLB,, | Performed by: FAMILY MEDICINE

## 2019-11-15 PROCEDURE — 3008F BODY MASS INDEX DOCD: CPT | Mod: CPTII,S$GLB,, | Performed by: FAMILY MEDICINE

## 2019-11-15 PROCEDURE — 73110 XR WRIST COMPLETE 3 VIEWS RIGHT: ICD-10-PCS | Mod: RT,S$GLB,, | Performed by: RADIOLOGY

## 2019-11-15 PROCEDURE — 3079F PR MOST RECENT DIASTOLIC BLOOD PRESSURE 80-89 MM HG: ICD-10-PCS | Mod: CPTII,S$GLB,, | Performed by: FAMILY MEDICINE

## 2019-11-15 PROCEDURE — 73130 X-RAY EXAM OF HAND: CPT | Mod: RT,S$GLB,, | Performed by: RADIOLOGY

## 2019-11-15 RX ORDER — IBUPROFEN 800 MG/1
800 TABLET ORAL EVERY 8 HOURS PRN
Qty: 30 TABLET | Refills: 0 | Status: ON HOLD | OUTPATIENT
Start: 2019-11-15 | End: 2020-01-02 | Stop reason: HOSPADM

## 2019-11-16 NOTE — PROGRESS NOTES
Subjective:       Patient ID: Clay Waters is a 39 y.o. male.    Vitals:  height is 6' (1.829 m) and weight is 127 kg (280 lb). His oral temperature is 97.3 °F (36.3 °C). His blood pressure is 138/86 and his pulse is 85. His respiration is 20 and oxygen saturation is 100%.     Chief Complaint: Hand Pain    This is a 39 y.o. male   with Past Medical History:  No date: Diabetes mellitus type I  No date: Hypertension   and No past surgical history on file.  who presents today with a chief complaint of right hand pain that began two days ago. He was skating with his son at the skating rink when he fell. He says that his thumb is numb. He's been taking advil to help relieve his symptoms.     Hand Pain    The incident occurred 2 days ago. Incident location: skating. The injury mechanism was a fall. The pain is present in the right hand. The pain does not radiate. The pain is at a severity of 8/10. The pain is severe. The pain has been fluctuating since the incident. Associated symptoms include numbness. Nothing aggravates the symptoms. Treatments tried: advil. The treatment provided no relief.       Constitution: Negative for fatigue.   HENT: Negative for facial swelling and facial trauma.    Neck: Negative for neck stiffness.   Cardiovascular: Negative for chest trauma.   Eyes: Negative for eye trauma, double vision and blurred vision.   Gastrointestinal: Negative for abdominal trauma, abdominal pain and rectal bleeding.   Genitourinary: Negative for hematuria, genital trauma and pelvic pain.   Musculoskeletal: Positive for pain and trauma. Negative for joint swelling, abnormal ROM of joint and pain with walking.   Skin: Negative for color change, wound, abrasion and laceration.   Neurological: Positive for numbness. Negative for dizziness, history of vertigo, light-headedness, coordination disturbances, altered mental status and loss of consciousness.   Hematologic/Lymphatic: Negative for history of bleeding  disorder.   Psychiatric/Behavioral: Negative for altered mental status.       Objective:      Physical Exam   Constitutional: He appears well-developed and well-nourished.   HENT:   Head: Normocephalic and atraumatic.   Eyes: Pupils are equal, round, and reactive to light. EOM are normal.   Neck: Normal range of motion. Neck supple.   Cardiovascular: Normal rate, regular rhythm and normal heart sounds.   Pulmonary/Chest: Effort normal and breath sounds normal.   Musculoskeletal: He exhibits tenderness (dorsum of right hand 3rd through 5th metacarpal with moderate swelling. ).   Nursing note and vitals reviewed.        Assessment:       1. Hand pain, right      r/o boxer's fracture vs contusion. Pending radiology report. Will follow up with patient by phone with results  Plan:         Hand pain, right  -     XR HAND COMPLETE 3 VIEW RIGHT; Future; Expected date: 11/15/2019  -     X-Ray Wrist Complete Right; Future; Expected date: 11/15/2019  -     Splint application; Future  -     ibuprofen (ADVIL,MOTRIN) 800 MG tablet; Take 1 tablet (800 mg total) by mouth every 8 (eight) hours as needed for Pain (with food).  Dispense: 30 tablet; Refill: 0

## 2019-11-16 NOTE — PATIENT INSTRUCTIONS
Boxers Fracture  A boxers fracture is a break in a bone in outer edge of the hand. The bone is under the pinky finger bones. Boxers fracture gets its name because it is often caused by punching a hard surface with the fist.  Understanding the bones of the hand  The bones of your hand are called metacarpal bones. They connect the bones of your fingers (phalanges) to the bones of your wrist (carpals). The fifth metacarpal is the metacarpal of the fifth finger (pinky). A metacarpal bone has a long section of the bone (shaft) connected to the end of the bone. The area where the shaft connects to the end of the bone is called the neck. The neck is the weakest point of the bone. This is where a boxers fracture happens.  What causes boxers fracture?  You may have a boxers fracture from an injury. This most often happens from punching a hard object, such as a punching bag, wall, or other firm surface. You may also get a boxers fracture if you fall on your closed fist.  Symptoms of boxers fracture  Symptoms of a boxers fracture can include:  · Painful bruising and swelling of the hand  · Bent, claw-like pinky finger that is out of its normal position  · Limited movement (range of motion) of the ring (fourth) and pinky fingers  · Change in the shape of the knuckle  Diagnosing boxers fracture  Your healthcare provider will ask about your symptoms and about how you injured the hand. He or she will also examine your hand. You may have an X-ray of the hand. This uses a small amount of radiation to create an image of the bones.  Treatment for boxers fracture  Your healthcare provider may refer you to an orthopedist. This is a doctor who treats hand problems. Your treatment may first include:  · Cleaning any cuts  · A tetanus vaccine, if you have cuts  · Resting your hand and keeping it raised (elevated) as much as possible for a few days  · Putting ice on it throughout the day  · Taking prescription or  over-the-counter pain medicine  · Wearing a splint for several weeks  You may need to have your bones put back into position. You may have a local pain medicine to keep you from feeling pain during this process. Your doctor will then move the bones back into place.  Surgery for boxers fracture  You may need surgery. This is done by an orthopedic surgeon. The surgeon makes a cut (incision) in the skin in order to put your bones back into place. You may need surgery if:  · The bone has broken through the skin  · The bone is broken in several places  · You use your hands and fingers for your work. For example, if you are a musician, craftsman, or .  · Your hand doesnt heal normally  After surgery, you may have physical therapy to help you heal. The therapy includes treatments and exercises.  What happens if you dont get treated?  An untreated boxers fracture can cause problems such as:  · You may be less able to  objects.  · You may not be able to move your hand or finger as much as you did before the injury.  · Your finger may not look normal.  Preventing boxers fracture  If you box, make sure you use the correct technique and the proper equipment.  How to manage boxers fracture  Your healthcare provider may tell you to:  · Keep your splint from getting wet.  · Keep your bones strong and help your fracture heal. Eat foods with vitamin D, calcium, and protein.  · Stop smoking. If you smoke, your fracture may not heal as quickly or properly.  · Be careful while your hand heals. You may need to use a brace for a while.     When to call the healthcare provider  Call your healthcare provider right away if you have any of these:  · Numbness or tingling in your fingers  · Fingers that look blue  · Pain or swelling that gets worse  · Problems with your splint  · Skin in the area is that is red, painful, or swollen   Date Last Reviewed: 4/1/2017  © 8720-1736 [employer]. [address] [place] [address],  [place] Rose [address]. All rights reserved. This information is not intended as a substitute for professional medical care. Always follow your healthcare professional's instructions.

## 2019-11-18 ENCOUNTER — TELEPHONE (OUTPATIENT)
Dept: URGENT CARE | Facility: CLINIC | Age: 39
End: 2019-11-18

## 2019-11-18 NOTE — TELEPHONE ENCOUNTER
Notified patient of re-read of Xray and confirmation of negative fracture. Patient reports persistent numbness in thumb but no pain. Advised to remove splint, continue with NSAIDs and if numbness persists then advise early followup. Patient verbalized understanding.

## 2019-12-30 ENCOUNTER — OFFICE VISIT (OUTPATIENT)
Dept: URGENT CARE | Facility: CLINIC | Age: 39
End: 2019-12-30
Payer: COMMERCIAL

## 2019-12-30 VITALS
DIASTOLIC BLOOD PRESSURE: 82 MMHG | WEIGHT: 280 LBS | SYSTOLIC BLOOD PRESSURE: 123 MMHG | OXYGEN SATURATION: 96 % | RESPIRATION RATE: 20 BRPM | BODY MASS INDEX: 37.93 KG/M2 | HEART RATE: 80 BPM | TEMPERATURE: 98 F | HEIGHT: 72 IN

## 2019-12-30 DIAGNOSIS — J20.8 ACUTE BRONCHITIS DUE TO OTHER SPECIFIED ORGANISMS: Primary | ICD-10-CM

## 2019-12-30 PROCEDURE — 99214 OFFICE O/P EST MOD 30 MIN: CPT | Mod: S$GLB,,, | Performed by: NURSE PRACTITIONER

## 2019-12-30 PROCEDURE — 99214 PR OFFICE/OUTPT VISIT, EST, LEVL IV, 30-39 MIN: ICD-10-PCS | Mod: S$GLB,,, | Performed by: NURSE PRACTITIONER

## 2019-12-30 RX ORDER — ALBUTEROL SULFATE 90 UG/1
2 AEROSOL, METERED RESPIRATORY (INHALATION) EVERY 6 HOURS PRN
Qty: 18 G | Refills: 1 | Status: SHIPPED | OUTPATIENT
Start: 2019-12-30 | End: 2021-04-11

## 2019-12-30 RX ORDER — PROMETHAZINE HYDROCHLORIDE AND DEXTROMETHORPHAN HYDROBROMIDE 6.25; 15 MG/5ML; MG/5ML
5 SYRUP ORAL NIGHTLY PRN
Qty: 80 ML | Refills: 0 | Status: SHIPPED | OUTPATIENT
Start: 2019-12-30 | End: 2020-01-13

## 2019-12-30 RX ORDER — PREDNISONE 10 MG/1
30 TABLET ORAL DAILY
Qty: 15 TABLET | Refills: 0 | Status: ON HOLD | OUTPATIENT
Start: 2019-12-30 | End: 2020-01-02 | Stop reason: HOSPADM

## 2019-12-31 NOTE — PATIENT INSTRUCTIONS
Return to Urgent Care or go to ER if symptoms worsen or fail to improve.  Follow up with PCP as recommended for further management.   Check blood sugar often and notify PCP if >180.      Bronchitis with Wheezing (Viral or Bacterial: Adult)    Bronchitis is an infection of the air passages. It often occurs during a cold and is usually caused by a virus. Symptoms include cough with mucus (phlegm) and low-grade fever. This illness is contagious during the first few days and is spread through the air by coughing and sneezing, or by direct contact (touching the sick person and then touching your own eyes, nose, or mouth).  If there is a lot of inflammation, air flow is restricted. The air passages may also go into spasm, especially if you have asthma. This causes wheezing and difficulty breathing even in people who do not have asthma.  Bronchitis usually lasts 7 to 14 days. The wheezing should improve with treatment during the first week. An inhaler is often prescribed to relax the air passages and stop wheezing. Antibiotics will be prescribed if your doctor thinks there is also a secondary bacterial infection.  Home care  · If symptoms are severe, rest at home for the first 2 to 3 days. When you go back to your usual activities, don't let yourself get too tired.  · Do not smoke. Also avoid being exposed to secondhand smoke.  · You may use over-the-counter medicine to control fever or pain, unless another medicine was prescribed. Note: If you have chronic liver or kidney disease or have ever had a stomach ulcer or gastrointestinal bleeding, talk with your healthcare provider before using these medicines. Also talk to your provider if you are taking medicine to prevent blood clots.) Aspirin should never be given to anyone younger than 18 years of age who is ill with a viral infection or fever. It may cause severe liver or brain damage.  · Your appetite may be poor, so a light diet is fine. Avoid dehydration by drinking 6  to 8 glasses of fluids per day (such as water, soft drinks, sports drinks, juices, tea, or soup). Extra fluids will help loosen secretions in the nose and lungs.  · Over-the-counter cough, cold, and sore-throat medicines will not shorten the length of the illness, but they may be helpful to reduce symptoms. (Note: Do not use decongestants if you have high blood pressure.)  · If you were given an inhaler, use it exactly as directed. If you need to use it more often than prescribed, your condition may be worsening. If this happens, contact your healthcare provider.  · If prescribed, finish all antibiotic medicine, even if you are feeling better after only a few days.  Follow-up care  Follow up with your healthcare provider, or as advised. If you had an X-ray or ECG (electrocardiogram), a specialist will review it. You will be notified of any new findings that may affect your care.  Note: If you are age 65 or older, or if you have a chronic lung disease or condition that affects your immune system, or you smoke, talk to your healthcare provider about having a pneumococcal vaccinations and a yearly influenza vaccination (flu shot).  When to seek medical advice  Call your healthcare provider right away if any of these occur:  · Fever of 100.4°F (38°C) or higher  · Coughing up increasing amounts of colored sputum  · Weakness, drowsiness, headache, facial pain, ear pain, or a stiff neck  Call 911, or get immediate medical care  Contact emergency services right away if any of these occur.  · Coughing up blood  · Worsening weakness, drowsiness, headache, or stiff neck  · Increased wheezing not helped with medication, shortness of breath, or pain with breathing  Date Last Reviewed: 9/13/2015  © 4124-5752 [employer]. [address] [place] [address], [place] [address]. All rights reserved. This information is not intended as a substitute for professional medical care. Always follow your healthcare professional's  instructions.

## 2019-12-31 NOTE — PROGRESS NOTES
Subjective:       Patient ID: Clay Waters is a 39 y.o. male.    Vitals:  height is 6' (1.829 m) and weight is 127 kg (280 lb). His temperature is 97.8 °F (36.6 °C). His blood pressure is 123/82 and his pulse is 80. His respiration is 20 and oxygen saturation is 96%.     Chief Complaint: Cough    Patient stated that cough started about a week ago.    Cough   This is a new problem. The current episode started 1 to 4 weeks ago. The problem has been gradually worsening. The problem occurs constantly. The cough is non-productive. Associated symptoms include headaches, nasal congestion, postnasal drip, shortness of breath and wheezing. Pertinent negatives include no chest pain, chills, ear congestion, ear pain, fever, heartburn, hemoptysis, myalgias, rash, rhinorrhea, sore throat, sweats or weight loss. The symptoms are aggravated by fumes. He has tried OTC cough suppressant for the symptoms. The treatment provided no relief. His past medical history is significant for asthma.       Constitution: Negative. Negative for chills and fever.   HENT: Positive for postnasal drip. Negative for ear pain, ear discharge, foreign body in ear, tinnitus, hearing loss, dental problem, drooling, mouth sores, tongue pain, tongue lesion, facial swelling, facial trauma, congestion, nosebleeds, foreign body in nose, sinus pain, sinus pressure, sore throat, trouble swallowing and voice change.    Neck: negative.   Cardiovascular: Negative.  Negative for chest pain.   Eyes: Negative.    Respiratory: Positive for cough, shortness of breath and wheezing. Negative for sleep apnea, chest tightness, sputum production, bloody sputum, COPD, stridor and asthma.    Gastrointestinal: Negative.  Negative for heartburn.   Genitourinary: Negative.    Musculoskeletal: Negative.  Negative for muscle ache.   Skin: Negative.  Negative for rash.   Allergic/Immunologic: Negative.  Negative for asthma.   Neurological: Positive for headaches.    Hematologic/Lymphatic: Negative.        Objective:      Physical Exam   Constitutional: He is oriented to person, place, and time. He appears well-developed and well-nourished. He is cooperative.  Non-toxic appearance. He does not have a sickly appearance. He does not appear ill. No distress.   HENT:   Head: Normocephalic and atraumatic.   Right Ear: Hearing, tympanic membrane, external ear and ear canal normal.   Left Ear: Hearing, tympanic membrane, external ear and ear canal normal.   Nose: Nose normal. No mucosal edema, rhinorrhea or nasal deformity. No epistaxis. Right sinus exhibits no maxillary sinus tenderness and no frontal sinus tenderness. Left sinus exhibits no maxillary sinus tenderness and no frontal sinus tenderness.   Mouth/Throat: Uvula is midline, oropharynx is clear and moist and mucous membranes are normal. No trismus in the jaw. Normal dentition. No uvula swelling. No oropharyngeal exudate, posterior oropharyngeal edema or posterior oropharyngeal erythema.   Eyes: Conjunctivae and lids are normal. No scleral icterus.   Neck: Trachea normal, full passive range of motion without pain and phonation normal. Neck supple. No neck rigidity. No edema and no erythema present.   Cardiovascular: Normal rate, regular rhythm, normal heart sounds, intact distal pulses and normal pulses.   Pulmonary/Chest: Effort normal. No respiratory distress. He has no decreased breath sounds. He has wheezes. He has rhonchi in the left upper field and the left middle field. He has no rales.   Abdominal: Normal appearance.   Musculoskeletal: Normal range of motion. He exhibits no edema or deformity.   Neurological: He is alert and oriented to person, place, and time. He exhibits normal muscle tone. Coordination normal.   Skin: Skin is warm, dry, intact, not diaphoretic, not pale and no rash.   Psychiatric: He has a normal mood and affect. His speech is normal and behavior is normal. Judgment and thought content normal.  Cognition and memory are normal.   Nursing note and vitals reviewed.        Assessment:       1. Acute bronchitis due to other specified organisms        Plan:         Acute bronchitis due to other specified organisms  -     albuterol (VENTOLIN HFA) 90 mcg/actuation inhaler; Inhale 2 puffs into the lungs every 6 (six) hours as needed for Wheezing. Rescue  Dispense: 18 g; Refill: 1  -     predniSONE (DELTASONE) 10 MG tablet; Take 3 tablets (30 mg total) by mouth once daily. for 5 days  Dispense: 15 tablet; Refill: 0  -     promethazine-dextromethorphan (PROMETHAZINE-DM) 6.25-15 mg/5 mL Syrp; Take 5 mLs by mouth nightly as needed (cough that keeps you awake at night).  Dispense: 80 mL; Refill: 0

## 2020-01-01 ENCOUNTER — HOSPITAL ENCOUNTER (OUTPATIENT)
Facility: HOSPITAL | Age: 40
Discharge: HOME | End: 2020-01-02
Attending: EMERGENCY MEDICINE | Admitting: EMERGENCY MEDICINE
Payer: COMMERCIAL

## 2020-01-01 DIAGNOSIS — R05.9 COUGH: ICD-10-CM

## 2020-01-01 DIAGNOSIS — E10.65 UNCONTROLLED TYPE 1 DIABETES MELLITUS WITH HYPERGLYCEMIA: ICD-10-CM

## 2020-01-01 DIAGNOSIS — E66.01 CLASS 3 SEVERE OBESITY DUE TO EXCESS CALORIES WITH SERIOUS COMORBIDITY AND BODY MASS INDEX (BMI) OF 45.0 TO 49.9 IN ADULT: ICD-10-CM

## 2020-01-01 DIAGNOSIS — I10 ESSENTIAL HYPERTENSION: ICD-10-CM

## 2020-01-01 DIAGNOSIS — E10.65 TYPE 1 DIABETES MELLITUS WITH HYPERGLYCEMIA: ICD-10-CM

## 2020-01-01 DIAGNOSIS — J10.1 INFLUENZA B: Primary | ICD-10-CM

## 2020-01-01 DIAGNOSIS — J45.901 EXACERBATION OF ASTHMA, UNSPECIFIED ASTHMA SEVERITY, UNSPECIFIED WHETHER PERSISTENT: ICD-10-CM

## 2020-01-01 DIAGNOSIS — Z96.41 INSULIN PUMP IN PLACE: ICD-10-CM

## 2020-01-01 PROBLEM — E66.813 CLASS 3 SEVERE OBESITY DUE TO EXCESS CALORIES WITH SERIOUS COMORBIDITY IN ADULT: Status: ACTIVE | Noted: 2020-01-01

## 2020-01-01 LAB
ANION GAP SERPL CALC-SCNC: 8 MMOL/L (ref 8–16)
BUN SERPL-MCNC: 11 MG/DL (ref 6–20)
BUN SERPL-MCNC: 13 MG/DL (ref 6–30)
CALCIUM SERPL-MCNC: 9 MG/DL (ref 8.7–10.5)
CHLORIDE SERPL-SCNC: 102 MMOL/L (ref 95–110)
CHLORIDE SERPL-SCNC: 96 MMOL/L (ref 95–110)
CO2 SERPL-SCNC: 25 MMOL/L (ref 23–29)
CREAT SERPL-MCNC: 0.9 MG/DL (ref 0.5–1.4)
CREAT SERPL-MCNC: 1 MG/DL (ref 0.5–1.4)
CTP QC/QA: YES
EST. GFR  (AFRICAN AMERICAN): >60 ML/MIN/1.73 M^2
EST. GFR  (NON AFRICAN AMERICAN): >60 ML/MIN/1.73 M^2
ESTIMATED AVG GLUCOSE: 249 MG/DL (ref 68–131)
GLUCOSE SERPL-MCNC: 239 MG/DL (ref 70–110)
GLUCOSE SERPL-MCNC: 359 MG/DL (ref 70–110)
HBA1C MFR BLD HPLC: 10.3 % (ref 4–5.6)
HCT VFR BLD CALC: 45 %PCV (ref 36–54)
LACTATE SERPL-SCNC: 1.4 MMOL/L (ref 0.5–2.2)
POC IONIZED CALCIUM: 1.14 MMOL/L (ref 1.06–1.42)
POC MOLECULAR INFLUENZA A AGN: NEGATIVE
POC MOLECULAR INFLUENZA B AGN: POSITIVE
POC TCO2 (MEASURED): 27 MMOL/L (ref 23–29)
POCT GLUCOSE: 191 MG/DL (ref 70–110)
POCT GLUCOSE: 248 MG/DL (ref 70–110)
POCT GLUCOSE: 351 MG/DL (ref 70–110)
POTASSIUM BLD-SCNC: 3.5 MMOL/L (ref 3.5–5.1)
POTASSIUM SERPL-SCNC: 3.9 MMOL/L (ref 3.5–5.1)
SAMPLE: ABNORMAL
SODIUM BLD-SCNC: 138 MMOL/L (ref 136–145)
SODIUM SERPL-SCNC: 135 MMOL/L (ref 136–145)

## 2020-01-01 PROCEDURE — 99285 PR EMERGENCY DEPT VISIT,LEVEL V: ICD-10-PCS | Mod: ,,, | Performed by: EMERGENCY MEDICINE

## 2020-01-01 PROCEDURE — 96374 THER/PROPH/DIAG INJ IV PUSH: CPT

## 2020-01-01 PROCEDURE — 25000003 PHARM REV CODE 250: Performed by: EMERGENCY MEDICINE

## 2020-01-01 PROCEDURE — G0378 HOSPITAL OBSERVATION PER HR: HCPCS

## 2020-01-01 PROCEDURE — 94640 AIRWAY INHALATION TREATMENT: CPT

## 2020-01-01 PROCEDURE — 82962 GLUCOSE BLOOD TEST: CPT

## 2020-01-01 PROCEDURE — 99220 PR INITIAL OBSERVATION CARE,LEVL III: CPT | Mod: ,,, | Performed by: HOSPITALIST

## 2020-01-01 PROCEDURE — 99285 EMERGENCY DEPT VISIT HI MDM: CPT | Mod: ,,, | Performed by: EMERGENCY MEDICINE

## 2020-01-01 PROCEDURE — 80047 BASIC METABLC PNL IONIZED CA: CPT | Mod: 91

## 2020-01-01 PROCEDURE — 94761 N-INVAS EAR/PLS OXIMETRY MLT: CPT

## 2020-01-01 PROCEDURE — 25000242 PHARM REV CODE 250 ALT 637 W/ HCPCS: Performed by: EMERGENCY MEDICINE

## 2020-01-01 PROCEDURE — 25000003 PHARM REV CODE 250: Performed by: HOSPITALIST

## 2020-01-01 PROCEDURE — 96361 HYDRATE IV INFUSION ADD-ON: CPT

## 2020-01-01 PROCEDURE — 80048 BASIC METABOLIC PNL TOTAL CA: CPT | Mod: 91

## 2020-01-01 PROCEDURE — 87502 INFLUENZA DNA AMP PROBE: CPT

## 2020-01-01 PROCEDURE — 63600175 PHARM REV CODE 636 W HCPCS: Performed by: HOSPITALIST

## 2020-01-01 PROCEDURE — 96372 THER/PROPH/DIAG INJ SC/IM: CPT

## 2020-01-01 PROCEDURE — C9399 UNCLASSIFIED DRUGS OR BIOLOG: HCPCS | Performed by: HOSPITALIST

## 2020-01-01 PROCEDURE — 99220 PR INITIAL OBSERVATION CARE,LEVL III: ICD-10-PCS | Mod: ,,, | Performed by: HOSPITALIST

## 2020-01-01 PROCEDURE — 63600175 PHARM REV CODE 636 W HCPCS: Performed by: EMERGENCY MEDICINE

## 2020-01-01 PROCEDURE — 36415 COLL VENOUS BLD VENIPUNCTURE: CPT

## 2020-01-01 PROCEDURE — 25000242 PHARM REV CODE 250 ALT 637 W/ HCPCS: Performed by: HOSPITALIST

## 2020-01-01 PROCEDURE — 83605 ASSAY OF LACTIC ACID: CPT

## 2020-01-01 PROCEDURE — 99285 EMERGENCY DEPT VISIT HI MDM: CPT | Mod: 25

## 2020-01-01 PROCEDURE — 83036 HEMOGLOBIN GLYCOSYLATED A1C: CPT

## 2020-01-01 RX ORDER — POLYETHYLENE GLYCOL 3350 17 G/17G
17 POWDER, FOR SOLUTION ORAL 2 TIMES DAILY PRN
Status: DISCONTINUED | OUTPATIENT
Start: 2020-01-01 | End: 2020-01-02 | Stop reason: HOSPADM

## 2020-01-01 RX ORDER — ONDANSETRON 2 MG/ML
4 INJECTION INTRAMUSCULAR; INTRAVENOUS EVERY 8 HOURS PRN
Status: DISCONTINUED | OUTPATIENT
Start: 2020-01-01 | End: 2020-01-02 | Stop reason: HOSPADM

## 2020-01-01 RX ORDER — LOSARTAN POTASSIUM 50 MG/1
50 TABLET ORAL DAILY
Status: DISCONTINUED | OUTPATIENT
Start: 2020-01-01 | End: 2020-01-02 | Stop reason: HOSPADM

## 2020-01-01 RX ORDER — GLUCAGON 1 MG
1 KIT INJECTION
Status: DISCONTINUED | OUTPATIENT
Start: 2020-01-01 | End: 2020-01-02 | Stop reason: HOSPADM

## 2020-01-01 RX ORDER — CETIRIZINE HYDROCHLORIDE 10 MG/1
10 TABLET ORAL DAILY
Status: DISCONTINUED | OUTPATIENT
Start: 2020-01-01 | End: 2020-01-02 | Stop reason: HOSPADM

## 2020-01-01 RX ORDER — ACETAMINOPHEN 500 MG
1000 TABLET ORAL
Status: COMPLETED | OUTPATIENT
Start: 2020-01-01 | End: 2020-01-01

## 2020-01-01 RX ORDER — INSULIN ASPART 100 [IU]/ML
1-10 INJECTION, SOLUTION INTRAVENOUS; SUBCUTANEOUS
Status: DISCONTINUED | OUTPATIENT
Start: 2020-01-01 | End: 2020-01-02 | Stop reason: HOSPADM

## 2020-01-01 RX ORDER — ACETAMINOPHEN 500 MG
500 TABLET ORAL EVERY 8 HOURS PRN
Status: DISCONTINUED | OUTPATIENT
Start: 2020-01-01 | End: 2020-01-02 | Stop reason: HOSPADM

## 2020-01-01 RX ORDER — IPRATROPIUM BROMIDE AND ALBUTEROL SULFATE 2.5; .5 MG/3ML; MG/3ML
3 SOLUTION RESPIRATORY (INHALATION) EVERY 6 HOURS
Status: DISCONTINUED | OUTPATIENT
Start: 2020-01-01 | End: 2020-01-02 | Stop reason: HOSPADM

## 2020-01-01 RX ORDER — OSELTAMIVIR PHOSPHATE 75 MG/1
75 CAPSULE ORAL
Status: COMPLETED | OUTPATIENT
Start: 2020-01-01 | End: 2020-01-01

## 2020-01-01 RX ORDER — OSELTAMIVIR PHOSPHATE 75 MG/1
75 CAPSULE ORAL 2 TIMES DAILY
Status: DISCONTINUED | OUTPATIENT
Start: 2020-01-01 | End: 2020-01-02 | Stop reason: HOSPADM

## 2020-01-01 RX ORDER — METHYLPREDNISOLONE SOD SUCC 125 MG
125 VIAL (EA) INJECTION
Status: COMPLETED | OUTPATIENT
Start: 2020-01-01 | End: 2020-01-01

## 2020-01-01 RX ORDER — IPRATROPIUM BROMIDE AND ALBUTEROL SULFATE 2.5; .5 MG/3ML; MG/3ML
3 SOLUTION RESPIRATORY (INHALATION)
Status: COMPLETED | OUTPATIENT
Start: 2020-01-01 | End: 2020-01-01

## 2020-01-01 RX ORDER — SODIUM CHLORIDE 0.9 % (FLUSH) 0.9 %
5 SYRINGE (ML) INJECTION
Status: DISCONTINUED | OUTPATIENT
Start: 2020-01-01 | End: 2020-01-02 | Stop reason: HOSPADM

## 2020-01-01 RX ORDER — INSULIN ASPART 100 [IU]/ML
15 INJECTION, SOLUTION INTRAVENOUS; SUBCUTANEOUS
Status: DISCONTINUED | OUTPATIENT
Start: 2020-01-01 | End: 2020-01-02

## 2020-01-01 RX ORDER — TALC
6 POWDER (GRAM) TOPICAL NIGHTLY PRN
Status: DISCONTINUED | OUTPATIENT
Start: 2020-01-01 | End: 2020-01-02 | Stop reason: HOSPADM

## 2020-01-01 RX ORDER — PREDNISONE 10 MG/1
40 TABLET ORAL DAILY
Status: DISCONTINUED | OUTPATIENT
Start: 2020-01-02 | End: 2020-01-02 | Stop reason: HOSPADM

## 2020-01-01 RX ORDER — PROMETHAZINE HYDROCHLORIDE AND CODEINE PHOSPHATE 6.25; 1 MG/5ML; MG/5ML
5 SOLUTION ORAL EVERY 8 HOURS PRN
Status: DISCONTINUED | OUTPATIENT
Start: 2020-01-01 | End: 2020-01-02 | Stop reason: HOSPADM

## 2020-01-01 RX ORDER — IBUPROFEN 200 MG
16 TABLET ORAL
Status: DISCONTINUED | OUTPATIENT
Start: 2020-01-01 | End: 2020-01-02 | Stop reason: HOSPADM

## 2020-01-01 RX ORDER — ONDANSETRON 4 MG/1
4 TABLET, ORALLY DISINTEGRATING ORAL EVERY 8 HOURS PRN
Status: DISCONTINUED | OUTPATIENT
Start: 2020-01-01 | End: 2020-01-02 | Stop reason: HOSPADM

## 2020-01-01 RX ORDER — IBUPROFEN 200 MG
24 TABLET ORAL
Status: DISCONTINUED | OUTPATIENT
Start: 2020-01-01 | End: 2020-01-02 | Stop reason: HOSPADM

## 2020-01-01 RX ADMIN — IPRATROPIUM BROMIDE AND ALBUTEROL SULFATE 3 ML: .5; 3 SOLUTION RESPIRATORY (INHALATION) at 12:01

## 2020-01-01 RX ADMIN — ACETAMINOPHEN 500 MG: 500 TABLET ORAL at 08:01

## 2020-01-01 RX ADMIN — GUAIFENESIN AND DEXTROMETHORPHAN HYDROBROMIDE 1 TABLET: 600; 30 TABLET, EXTENDED RELEASE ORAL at 02:01

## 2020-01-01 RX ADMIN — INSULIN ASPART 5 UNITS: 100 INJECTION, SOLUTION INTRAVENOUS; SUBCUTANEOUS at 10:01

## 2020-01-01 RX ADMIN — INSULIN DETEMIR 50 UNITS: 100 INJECTION, SOLUTION SUBCUTANEOUS at 09:01

## 2020-01-01 RX ADMIN — IPRATROPIUM BROMIDE AND ALBUTEROL SULFATE 3 ML: .5; 3 SOLUTION RESPIRATORY (INHALATION) at 08:01

## 2020-01-01 RX ADMIN — INSULIN ASPART 2 UNITS: 100 INJECTION, SOLUTION INTRAVENOUS; SUBCUTANEOUS at 05:01

## 2020-01-01 RX ADMIN — ACETAMINOPHEN 1000 MG: 500 TABLET ORAL at 12:01

## 2020-01-01 RX ADMIN — PROMETHAZINE HYDROCHLORIDE AND CODEINE PHOSPHATE 5 ML: 10; 6.25 SOLUTION ORAL at 10:01

## 2020-01-01 RX ADMIN — SODIUM CHLORIDE 1000 ML: 0.9 INJECTION, SOLUTION INTRAVENOUS at 12:01

## 2020-01-01 RX ADMIN — LOSARTAN POTASSIUM 50 MG: 50 TABLET ORAL at 04:01

## 2020-01-01 RX ADMIN — CETIRIZINE HYDROCHLORIDE 10 MG: 10 TABLET, FILM COATED ORAL at 04:01

## 2020-01-01 RX ADMIN — OSELTAMIVIR PHOSPHATE 75 MG: 75 CAPSULE ORAL at 09:01

## 2020-01-01 RX ADMIN — OSELTAMIVIR PHOSPHATE 75 MG: 75 CAPSULE ORAL at 01:01

## 2020-01-01 RX ADMIN — GUAIFENESIN AND DEXTROMETHORPHAN HYDROBROMIDE 1 TABLET: 600; 30 TABLET, EXTENDED RELEASE ORAL at 09:01

## 2020-01-01 RX ADMIN — METHYLPREDNISOLONE SODIUM SUCCINATE 125 MG: 125 INJECTION, POWDER, FOR SOLUTION INTRAMUSCULAR; INTRAVENOUS at 12:01

## 2020-01-01 RX ADMIN — INSULIN ASPART 15 UNITS: 100 INJECTION, SOLUTION INTRAVENOUS; SUBCUTANEOUS at 05:01

## 2020-01-01 NOTE — ED NOTES
"The patient came to the ER today with c/o SOB, a rattling in her chest, cough that is productive "sometimes". Last week, he noted a cough. The patient states Monday night he was seen at an after hours clinic and told he had bronchitis. He was given steroids. Pt did not get a flu shot this year. Pt is diabetic. Has insulin pump.   "

## 2020-01-01 NOTE — LETTER
January 2, 2020         [address]  [facility] [address]  Phone: [phone]  Fax: [phone]       Patient: Clay Waters   YOB: 1980  Date of Visit: 01/02/2020    To Whom It May Concern:    DOYLE Waters  was at [facility] on 01/02/2020. He may return to work/school on 1/6/2020 with no restrictions. If you have any questions or concerns, or if I can be of further assistance, please do not hesitate to contact me.    Sincerely,    Shy Craig MD

## 2020-01-01 NOTE — ED PROVIDER NOTES
Encounter Date: 1/1/2020    SCRIBE #1 NOTE: I, Shae Denney, am scribing for, and in the presence of,  Dr. Cornejo. I have scribed the following portions of the note - Other sections scribed: HPI, ROS, PE.       History     Chief Complaint   Patient presents with    URI     [facility], [facility],      Time patient was seen by the provider: 11:55 AM      The patient is a 39 y.o. male with medical history of type 1 diabetes and HTN who presents to the ED with a complaint of URI. Patient reports dry cough x 2 weeks, worsening within the last few days. He states sometimes when he coughs so much to the point he feels like he is going to pass out.   He was seen at Urgent Care 2 days ago for similar symptoms and was diagnosed with bronchitis. He was discharged with prednisone, promethazine and albuterol inhaler. He did not receive flu shot.     The history is provided by the patient and medical records.     Review of patient's allergies indicates:   Allergen Reactions    Vicodin [hydrocodone-acetaminophen] Nausea And Vomiting     Past Medical History:   Diagnosis Date    Childhood asthma     Diabetes mellitus type I     Hypertension      History reviewed. No pertinent surgical history.  History reviewed. No pertinent family history.  Social History     Tobacco Use    Smoking status: Never Smoker    Smokeless tobacco: Never Used   Substance Use Topics    Alcohol use: Yes     Comment: occas, none recently    Drug use: No     Review of Systems   Constitutional: Negative for fever.   HENT: Negative for sore throat.    Respiratory: Positive for cough, shortness of breath and wheezing.    Cardiovascular: Negative for chest pain.   Gastrointestinal: Negative for nausea.   Genitourinary: Negative for dysuria.   Musculoskeletal: Negative for back pain.   Skin: Negative for rash.   Neurological: Negative for weakness.   Hematological: Does not bruise/bleed easily.       Physical Exam      Initial Vitals [01/01/20 1150]   BP Pulse Resp Temp SpO2   (!) 147/78 101 20 (!) 100.4 °F (38 °C) 98 %      MAP       --         Physical Exam    Nursing note and vitals reviewed.  Constitutional: He appears well-developed and well-nourished. He is not diaphoretic. No distress.   HENT:   Head: Normocephalic and atraumatic.   Eyes: Conjunctivae and EOM are normal.   Neck: Normal range of motion. Neck supple.   Cardiovascular: Normal rate and regular rhythm.   No murmur heard.  Pulmonary/Chest: Breath sounds normal. No respiratory distress.   No accessory muscle use. Diffuse expiratory wheezing.   Abdominal: Soft. He exhibits no distension. There is no tenderness. There is no guarding.   Musculoskeletal: Normal range of motion.   Neurological: He is alert and oriented to person, place, and time.   Skin: Skin is warm and dry. No rash noted.         ED Course   Procedures  Labs Reviewed   POCT INFLUENZA A/B MOLECULAR - Abnormal; Notable for the following components:       Result Value    POC Molecular Influenza B Ag Positive (*)     All other components within normal limits   ISTAT PROCEDURE - Abnormal; Notable for the following components:    POC Glucose 359 (*)     All other components within normal limits   POCT GLUCOSE - Abnormal; Notable for the following components:    POCT Glucose 248 (*)     All other components within normal limits   ISTAT CHEM8   POCT GLUCOSE MONITORING CONTINUOUS          Imaging Results          X-Ray Chest PA And Lateral (Final result)  Result time 01/01/20 12:27:43    Final result by Ankur Enciso MD (01/01/20 12:27:43)                 Impression:      No acute abnormality.      Electronically signed by: Ankur Enciso MD  Date:    01/01/2020  Time:    12:27             Narrative:    EXAMINATION:  XR CHEST PA AND LATERAL    CLINICAL HISTORY:  Cough    TECHNIQUE:  PA and lateral views of the chest were performed.    COMPARISON:  07/30/2007    FINDINGS:  The lungs are clear, with  normal appearance of pulmonary vasculature and no pleural effusion or pneumothorax.    The cardiac silhouette is normal in size. The hilar and mediastinal contours are unremarkable.    Bones are intact.                                 Medical Decision Making:   History:   Old Medical Records: I decided to obtain old medical records.  Initial Assessment:   40 yo m, h/o childhood asthma, here with URI sx x 1 week, worsening SOB and cough    On exam, febrile, diffuse wheezing, normal O2 sat  Differential Diagnosis:   Bronchitis vs influenza vs pneumonia    Clinically not showing any signs of sepsis  Clinical Tests:   Lab Tests: Ordered and Reviewed  Radiological Study: Ordered and Reviewed  ED Management:  Influenza B positive  Hyperglycemia 2/2 steroid use and infection, no DKA, AG 15  Plan for IVF, continuous nebs, reassess  CXR to r/o pneumonia  Repeat FS    1:52 PM  S/p nebs, pt reassessed, still c/o severe SOB, diffuse wheezing and tried to ambulate but became weak and dizzy  Will admit for observation, continuous nebs  Repeat FS s/p 1 L   tamiflu ordered            Scribe Attestation:   Scribe #1: I performed the above scribed service and the documentation accurately describes the services I performed. I attest to the accuracy of the note.               I, Dr. Maria Antonia Cornejo, personally performed the services described in this documentation. All medical record entries made by the scribe were at my direction and in my presence.  I have reviewed the chart and agree that the record reflects my personal performance and is accurate and complete. Maria Antonia Cornejo MD.  12:46 PM 01/01/2020           Clinical Impression:       ICD-10-CM ICD-9-CM   1. Influenza B J10.1 487.1   2. Cough R05 786.2                             Maria Antonia Cornejo MD  01/01/20 1901

## 2020-01-01 NOTE — NURSING
Pt has insulin pump and prefers nurses to administer  insulin, he is suspending his pump, Dr. Craig made aware and is going to come see the pt soon and afterwards put new orders in.

## 2020-01-02 VITALS
WEIGHT: 296.5 LBS | HEART RATE: 83 BPM | SYSTOLIC BLOOD PRESSURE: 142 MMHG | BODY MASS INDEX: 40.16 KG/M2 | HEIGHT: 72 IN | TEMPERATURE: 98 F | DIASTOLIC BLOOD PRESSURE: 76 MMHG | OXYGEN SATURATION: 95 % | RESPIRATION RATE: 20 BRPM

## 2020-01-02 LAB
ANION GAP SERPL CALC-SCNC: 9 MMOL/L (ref 8–16)
BASOPHILS # BLD AUTO: 0.02 K/UL (ref 0–0.2)
BASOPHILS NFR BLD: 0.1 % (ref 0–1.9)
BUN SERPL-MCNC: 16 MG/DL (ref 6–20)
CALCIUM SERPL-MCNC: 8.8 MG/DL (ref 8.7–10.5)
CHLORIDE SERPL-SCNC: 101 MMOL/L (ref 95–110)
CHOLEST SERPL-MCNC: 144 MG/DL (ref 120–199)
CHOLEST/HDLC SERPL: 2.7 {RATIO} (ref 2–5)
CO2 SERPL-SCNC: 27 MMOL/L (ref 23–29)
CREAT SERPL-MCNC: 1 MG/DL (ref 0.5–1.4)
DIFFERENTIAL METHOD: ABNORMAL
EOSINOPHIL # BLD AUTO: 0 K/UL (ref 0–0.5)
EOSINOPHIL NFR BLD: 0.1 % (ref 0–8)
ERYTHROCYTE [DISTWIDTH] IN BLOOD BY AUTOMATED COUNT: 13.2 % (ref 11.5–14.5)
EST. GFR  (AFRICAN AMERICAN): >60 ML/MIN/1.73 M^2
EST. GFR  (NON AFRICAN AMERICAN): >60 ML/MIN/1.73 M^2
GLUCOSE SERPL-MCNC: 294 MG/DL (ref 70–110)
HCT VFR BLD AUTO: 42.6 % (ref 40–54)
HDLC SERPL-MCNC: 54 MG/DL (ref 40–75)
HDLC SERPL: 37.5 % (ref 20–50)
HGB BLD-MCNC: 13.8 G/DL (ref 14–18)
IMM GRANULOCYTES # BLD AUTO: 0.06 K/UL (ref 0–0.04)
IMM GRANULOCYTES NFR BLD AUTO: 0.4 % (ref 0–0.5)
LDLC SERPL CALC-MCNC: 78.8 MG/DL (ref 63–159)
LYMPHOCYTES # BLD AUTO: 1.7 K/UL (ref 1–4.8)
LYMPHOCYTES NFR BLD: 11.4 % (ref 18–48)
MCH RBC QN AUTO: 27.1 PG (ref 27–31)
MCHC RBC AUTO-ENTMCNC: 32.4 G/DL (ref 32–36)
MCV RBC AUTO: 84 FL (ref 82–98)
MONOCYTES # BLD AUTO: 1.2 K/UL (ref 0.3–1)
MONOCYTES NFR BLD: 7.9 % (ref 4–15)
NEUTROPHILS # BLD AUTO: 11.9 K/UL (ref 1.8–7.7)
NEUTROPHILS NFR BLD: 80.1 % (ref 38–73)
NONHDLC SERPL-MCNC: 90 MG/DL
NRBC BLD-RTO: 0 /100 WBC
PHOSPHATE SERPL-MCNC: 4.2 MG/DL (ref 2.7–4.5)
PLATELET # BLD AUTO: 261 K/UL (ref 150–350)
PMV BLD AUTO: 9.5 FL (ref 9.2–12.9)
POCT GLUCOSE: 213 MG/DL (ref 70–110)
POCT GLUCOSE: 217 MG/DL (ref 70–110)
POTASSIUM SERPL-SCNC: 4 MMOL/L (ref 3.5–5.1)
RBC # BLD AUTO: 5.09 M/UL (ref 4.6–6.2)
SODIUM SERPL-SCNC: 137 MMOL/L (ref 136–145)
TRIGL SERPL-MCNC: 56 MG/DL (ref 30–150)
WBC # BLD AUTO: 14.8 K/UL (ref 3.9–12.7)

## 2020-01-02 PROCEDURE — 94761 N-INVAS EAR/PLS OXIMETRY MLT: CPT

## 2020-01-02 PROCEDURE — 80061 LIPID PANEL: CPT

## 2020-01-02 PROCEDURE — 84100 ASSAY OF PHOSPHORUS: CPT

## 2020-01-02 PROCEDURE — 99217 PR OBSERVATION CARE DISCHARGE: ICD-10-PCS | Mod: ICN,,, | Performed by: HOSPITALIST

## 2020-01-02 PROCEDURE — 36415 COLL VENOUS BLD VENIPUNCTURE: CPT

## 2020-01-02 PROCEDURE — 25000242 PHARM REV CODE 250 ALT 637 W/ HCPCS: Performed by: HOSPITALIST

## 2020-01-02 PROCEDURE — 80048 BASIC METABOLIC PNL TOTAL CA: CPT

## 2020-01-02 PROCEDURE — G0378 HOSPITAL OBSERVATION PER HR: HCPCS

## 2020-01-02 PROCEDURE — 96372 THER/PROPH/DIAG INJ SC/IM: CPT

## 2020-01-02 PROCEDURE — 25000003 PHARM REV CODE 250: Performed by: HOSPITALIST

## 2020-01-02 PROCEDURE — 94640 AIRWAY INHALATION TREATMENT: CPT

## 2020-01-02 PROCEDURE — 99217 PR OBSERVATION CARE DISCHARGE: CPT | Mod: ICN,,, | Performed by: HOSPITALIST

## 2020-01-02 PROCEDURE — 63600175 PHARM REV CODE 636 W HCPCS: Performed by: HOSPITALIST

## 2020-01-02 PROCEDURE — 85025 COMPLETE CBC W/AUTO DIFF WBC: CPT

## 2020-01-02 RX ORDER — ALBUTEROL SULFATE 1.25 MG/3ML
1.25 SOLUTION RESPIRATORY (INHALATION) 3 TIMES DAILY
Qty: 90 ML | Refills: 2 | Status: SHIPPED | OUTPATIENT
Start: 2020-01-02 | End: 2021-01-01

## 2020-01-02 RX ORDER — PREDNISONE 20 MG/1
40 TABLET ORAL DAILY
Qty: 10 TABLET | Refills: 0 | Status: SHIPPED | OUTPATIENT
Start: 2020-01-03 | End: 2020-01-08

## 2020-01-02 RX ORDER — PROMETHAZINE HYDROCHLORIDE AND CODEINE PHOSPHATE 6.25; 1 MG/5ML; MG/5ML
5 SOLUTION ORAL EVERY 12 HOURS PRN
Qty: 240 ML | Refills: 0 | Status: SHIPPED | OUTPATIENT
Start: 2020-01-02 | End: 2020-01-13

## 2020-01-02 RX ORDER — INSULIN ASPART 100 [IU]/ML
20 INJECTION, SOLUTION INTRAVENOUS; SUBCUTANEOUS
Status: DISCONTINUED | OUTPATIENT
Start: 2020-01-02 | End: 2020-01-02 | Stop reason: HOSPADM

## 2020-01-02 RX ORDER — OSELTAMIVIR PHOSPHATE 75 MG/1
75 CAPSULE ORAL 2 TIMES DAILY
Qty: 10 CAPSULE | Refills: 0 | Status: SHIPPED | OUTPATIENT
Start: 2020-01-02 | End: 2020-01-07

## 2020-01-02 RX ADMIN — GUAIFENESIN AND DEXTROMETHORPHAN HYDROBROMIDE 1 TABLET: 600; 30 TABLET, EXTENDED RELEASE ORAL at 08:01

## 2020-01-02 RX ADMIN — IPRATROPIUM BROMIDE AND ALBUTEROL SULFATE 3 ML: .5; 3 SOLUTION RESPIRATORY (INHALATION) at 12:01

## 2020-01-02 RX ADMIN — INSULIN ASPART 15 UNITS: 100 INJECTION, SOLUTION INTRAVENOUS; SUBCUTANEOUS at 08:01

## 2020-01-02 RX ADMIN — IPRATROPIUM BROMIDE AND ALBUTEROL SULFATE 3 ML: .5; 3 SOLUTION RESPIRATORY (INHALATION) at 08:01

## 2020-01-02 RX ADMIN — INSULIN ASPART 4 UNITS: 100 INJECTION, SOLUTION INTRAVENOUS; SUBCUTANEOUS at 08:01

## 2020-01-02 RX ADMIN — CETIRIZINE HYDROCHLORIDE 10 MG: 10 TABLET, FILM COATED ORAL at 08:01

## 2020-01-02 RX ADMIN — INSULIN ASPART 20 UNITS: 100 INJECTION, SOLUTION INTRAVENOUS; SUBCUTANEOUS at 12:01

## 2020-01-02 RX ADMIN — LOSARTAN POTASSIUM 50 MG: 50 TABLET ORAL at 08:01

## 2020-01-02 RX ADMIN — PREDNISONE 40 MG: 10 TABLET ORAL at 08:01

## 2020-01-02 RX ADMIN — OSELTAMIVIR PHOSPHATE 75 MG: 75 CAPSULE ORAL at 08:01

## 2020-01-02 RX ADMIN — IPRATROPIUM BROMIDE AND ALBUTEROL SULFATE 3 ML: .5; 3 SOLUTION RESPIRATORY (INHALATION) at 01:01

## 2020-01-02 RX ADMIN — INSULIN ASPART 4 UNITS: 100 INJECTION, SOLUTION INTRAVENOUS; SUBCUTANEOUS at 12:01

## 2020-01-02 NOTE — DISCHARGE SUMMARY
DISCHARGE SUMMARY  Hospital Medicine    [place] [facility]    Patient Name: Clay Waters  YOB: 1980    Admit Date: 1/1/2020    Discharge Date: 01/02/2020    Discharge Attending Physician: Shy Craig MD     Chief Complaint: Asthma exacerbation due to influenza     Princilpal Diagnoses:  Active Hospital Problems    Diagnosis  POA    *Asthma exacerbation [J45.901]  Yes     Priority: 1 - High    Influenza B [J10.1]  Yes     Priority: 1 - High    Type 1 diabetes mellitus with hyperglycemia [E10.65]  Yes     Priority: 2     Uncontrolled type 1 diabetes mellitus with hyperglycemia [E10.65]  Yes    Class 3 severe obesity due to excess calories with serious comorbidity in adult [E66.01]  Yes    Essential hypertension [I10]  Yes    Insulin pump in place [Z96.41]  Not Applicable      Resolved Hospital Problems   No resolved problems to display.       Discharged Condition: Admit problems have stabilized      HOSPITAL COURSE:      Initial Presentation:    39 y.o. male with uncontrolled T1DM (A1C 10, on insulin pump at home), HTN, and childhood asthma (that has resolved in adulthood), presented to the ED on 1/1/20 with 2-3 day hx of worsening cough and 2 day hx of audible wheezing. Patient started having cough on 12/30 and was seen in urgent care and was prescribed albuterol and prednisone 10mg TID , was not tested for flu at that time.  Patient has not been having sore throat, muscles aches, or fevers.  Patient's worsening wheezing brought him to the ED. He used his son's nebulizer at home, but it did not improve his symptoms. Has hx of childhood asthma, which resolved as an adult, no recent exacerbations. Patient's family (wife and children) had flu B couple of months ago, but otherwise no recent sick contacts.  Did not receive a flu vaccine this year. Hx of uncontrolled DM, uses insulin pump at home , uses approx 120-130 units of novolog, and carb counting with meals. Sees an [place]  "endocrinologist.  States that his DM Is "pretty well controlled". A1C of 10     In the ED, noted to have diffuse wheezing, glucose in 200s, satting well on RA, flu B + . Received tamiflu and 125mg IV solumedrol     Course of Principle Problem for Admission:    Asthma exacerbation  Influenza B  - Has hx of childhood asthma, which resolved as an adult, no recent exacerbations.  - reactive airway dz / symptoms/ asthma exacerbation due to influenza B  - tamiflu 75 mg BID for 5 days  - s/p 125 mg solumedrol in the ED on 1/1, cont with prednisone 40mg for 5-7 days  - scheduled duonebs  - cough control with mucinex and codeine cough syrup     On the day of d/c, patient was doing well with no acute issues.   No longer hearing himself wheeze, not requiring supplemental O2. Feels better.    D/c home with tamiflu and prednisone . Recommended scheduled nebulizers TID while on steroids and then PRN.   Recommended his family (espcially child with poorly controlled asthma ) to get flu ppx with tamiflu   [facility] referral      Physical Exam:  Constitutional: Well-developed, well-nourished, non-distressed, not diaphoretic.   HENT: NC/AT, external ears normal, oropharynx clear, MMM w/o exudates.   Eyes: PERRL, EOMI, conjunctiva normal, no discharge b/l, no scleral icterus   Neck: normal ROM, supple,  CV: RRR, no m/r/g, no carotid bruits, +2 peripheral pulses.  Pulmonary/Chest wall: Breathing comfortably w/o distress, no resp distress, not using excessory muscles of respiration, diffuse expiratory wheezes . No audible wheezing on observation alone  GI: Soft, non-tender, non-distended, (+) BS, (+) BM   Musculoskeletal: Normal ROM, no edema, no atrophy, no tenderness throughout   Neurological: AAO x 4, CN II-XI in tact, nl sensation, nl strength/tone   Skin: warm, dry, (-) erythema, (-) rash, (-)pallor  Psych: normal mood and affect, normal behavior, thought content and judgement.  Vitals reviewed.       Other Medical Problems " "Addressed in the Hospital:    Uncontrolled type 1 diabetes mellitus with hyperglycemia  Type 1 diabetes mellitus with hyperglycemia  - uses approx 120-130 units of novolog, and carb counting with meals. Sees an [place] endocrinologist.  States that his DM Is "pretty well controlled". A1C of 10  - DM and hyperglycemia education provided.   - hold insulin pump while inpatient  - will plan for deter 50U QHS and 15 U TID with meals. Up titrate based on POCT glucose mitch with steroid use  - nutrition consulted for dietary education   - lipid panel reviewed   -  refer to [facility] for second opinion for DM and DM 1 management.     - patient stated that he has been watching his diet and exercising and has lost weight, however, diabetes has not been improving with these changes      Essential hypertension  - cont home losartan        Class 3 severe obesity due to excess calories with serious comorbidity in adult  - Body mass index is 40.22 kg/m².   - nutrition consulted for dietary education . Especially in setting of DM   - patient stated that he has been watching his diet and exercising and has lost weight, however, diabetes has not been improving with these changes     CONSULTS: none    PROCEDURES: none    Labs:    Chemistries:   Recent Labs   Lab 01/01/20  1604 01/02/20  0451   * 137   K 3.9 4.0    101   CO2 25 27   BUN 11 16   CREATININE 1.0 1.0   CALCIUM 9.0 8.8   PHOS  --  4.2        WBC:   Recent Labs   Lab 01/02/20  0451   WBC 14.80*     Bands:     CBC/Anemia Labs: Coags:    Recent Labs   Lab 01/01/20  1206 01/02/20  0451   WBC  --  14.80*   HGB  --  13.8*   HCT 45 42.6   PLT  --  261   MCV  --  84   RDW  --  13.2    No results for input(s): PT, INR, APTT in the last 168 hours.     Diagnostic Results:    POC Molecular Influenza A Ag Negative, Not Reported Negative    POC Molecular Influenza B Ag Negative, Not Reported PositiveAbnormal      Acceptable  Yes        Disposition:  " Home         Future Scheduled Appointments:  Future Appointments   Date Time Provider Department Center   1/13/2020  1:00 PM Forest Monroe MD [facility]       Follow-up Plans from This Hospitalization:  Endocrine     Discharge Medication List:       EMBER Waters   Home Medication Instructions ASHKAN:[id number]    Printed on:01/02/20 2234   Medication Information                      albuterol (ACCUNEB) 1.25 mg/3 mL Nebu  Take 3 mLs (1.25 mg total) by nebulization 3 (three) times daily. 3 times per day while on tamiflu/steroids, then as needed             albuterol (PROVENTIL/VENTOLIN HFA) 90 mcg/actuation inhaler  Inhale 2 puffs into the lungs every 6 (six) hours as needed for Wheezing or Shortness of Breath.             albuterol (VENTOLIN HFA) 90 mcg/actuation inhaler  Inhale 2 puffs into the lungs every 6 (six) hours as needed for Wheezing. Rescue             cetirizine (ZYRTEC) 10 MG tablet  Take 10 mg by mouth once daily.             dextromethorphan-guaifenesin  mg (MUCINEX DM)  mg per 12 hr tablet  Take 1 tablet by mouth 2 (two) times daily. for 5 days             INSULIN PUMP CARTRIDGE SUBQ  Inject into the skin.             losartan (COZAAR) 50 MG tablet  Take 50 mg by mouth once daily.             NOVOLOG U-100 INSULIN ASPART 100 unit/mL injection               oseltamivir (TAMIFLU) 75 MG capsule  Take 1 capsule (75 mg total) by mouth 2 (two) times daily. for 5 days             predniSONE (DELTASONE) 20 MG tablet  Take 2 tablets (40 mg total) by mouth once daily. for 5 days             promethazine-codeine 6.25-10 mg/5 ml (PHENERGAN WITH CODEINE) 6.25-10 mg/5 mL syrup  Take 5 mLs by mouth every 12 (twelve) hours as needed (severe cough).             promethazine-dextromethorphan (PROMETHAZINE-DM) 6.25-15 mg/5 mL Syrp  Take 5 mLs by mouth nightly as needed (cough that keeps you awake at night).                   At the time of discharge patient was told to take all medications  as prescribed, to keep all followup appointments, and to call their primary care physician or return to the emergency room if they have any worsening or concerning symptoms.    Time spent on the discharge of the patient including review of hospital course with the patient. reviewing discharge medications and arranging follow-up care 45 minutes.  Patient was seen and examined on the date of discharge and determined to be suitable for discharge.        Signing Physician:  Shy Craig MD

## 2020-01-02 NOTE — NURSING
Order to d/c home today. Saline lock removed. VSS. Discharge instructions given to pt. Pt verbalized understanding. Pt discharged from OBS unit via w/c. Pt accompanied by transport associate. All personal belongings taken home by pt.

## 2020-01-02 NOTE — PLAN OF CARE
CM met with patient at the bedside to discuss discharge planning assessment. Pt lives with family and has transportation at discharge. Pt verified PCP and Pharmacy. CM will continue to follow for discharge needs.        01/02/20 0944   Discharge Assessment   Assessment Type Discharge Planning Assessment   Confirmed/corrected address and phone number on facesheet? Yes   Assessment information obtained from? Patient   Expected Length of Stay (days) 1   Communicated expected length of stay with patient/caregiver yes   Prior to hospitilization cognitive status: Alert/Oriented   Prior to hospitalization functional status: Independent   Current cognitive status: Alert/Oriented   Current Functional Status: Independent   Lives With spouse;child(howard), dependent   Able to Return to Prior Arrangements yes   Is patient able to care for self after discharge? Yes   Who are your caregiver(s) and their phone number(s)? Ginger Waters- paxton- [phone]   Patient's perception of discharge disposition home or selfcare   Readmission Within the Last 30 Days no previous admission in last 30 days   Patient currently being followed by outpatient case management? No   Patient currently receives any other outside agency services? No   Equipment Currently Used at Home nebulizer;glucometer   Do you have any problems affording any of your prescribed medications? No   Is the patient taking medications as prescribed? yes   Does the patient have transportation home? Yes   Transportation Anticipated family or friend will provide   Does the patient receive services at the [facility]mad[facility] Clinic? No   Discharge Plan A Home with family   Discharge Plan B Home with family   DME Needed Upon Discharge  none   Patient/Family in Agreement with Plan yes

## 2020-01-02 NOTE — H&P
"       History and Physical   Hospital Medicine     Patient Name: Clay Waters  MRN:  [id number]  [place] Medicine [place] [facility] Shy Craig MD  Date of Admission:  1/1/2020     Principal Problem:  Asthma exacerbation   Primary Care Physician: Forest Monroe MD      History of Present Illness:     Mr. Clay Waters is a 39 y.o. male with uncontrolled T1DM (A1C 10, on insulin pump at home), HTN, and childhood asthma (that has resolved in adulthood), presented to the ED on 1/1/20 with 2-3 day hx of worsening cough and 2 day hx of audible wheezing. Patient started having cough on 12/30 and was seen in urgent care and was prescribed albuterol and prednisone 10mg TID , was not tested for flu at that time.  Patient has not been having sore throat, muscles aches, or fevers.  Patient's worsening wheezing brought him to the ED. He used his son's nebulizer at home, but it did not improve his symptoms. Has hx of childhood asthma, which resolved as an adult, no recent exacerbations. Patient's family (wife and children) had flu B couple of months ago, but otherwise no recent sick contacts.  Did not receive a flu vaccine this year. Hx of uncontrolled DM, uses insulin pump at home , uses approx 120-130 units of novolog, and carb counting with meals. Sees an [place] endocrinologist.  States that his DM Is "pretty well controlled". A1C of 10    In the ED, noted to have diffuse wheezing, glucose in 200s, satting well on RA, flu B + . Received tamiflu and 125mg IV solumedrol     Review of Systems   Constitutional: Negative for chills, fatigue,  + fever.   HENT: Negative for sore throat, trouble swallowing.    Eyes: Negative for photophobia, visual disturbance.   Respiratory: Negative for cough, shortness of breath.    Cardiovascular: Negative for chest pain, palpitations, leg swelling.   Gastrointestinal: Negative for abdominal pain, constipation, diarrhea, nausea, vomiting.   Endocrine: Negative for cold " intolerance, heat intolerance.   Genitourinary: Negative for dysuria, frequency.   Musculoskeletal: Negative for arthralgias, myalgias.   Skin: Negative for rash, wound, erythema   Neurological: Negative for dizziness, syncope, weakness, light-headedness.   Psychiatric/Behavioral: Negative for confusion, hallucinations, anxiety  All other systems reviewed and are negative.      Past Medical History:   Past Medical History:   Diagnosis Date    Asthma exacerbation 1/1/2020    Childhood asthma     Class 3 severe obesity due to excess calories with serious comorbidity in adult 1/1/2020    Diabetes mellitus type I     Hypertension     Insulin pump in place 10/1/2018    Uncontrolled type 1 diabetes mellitus with hyperglycemia 1/1/2020       Past Surgical History:   History reviewed. No pertinent surgical history.    Social History:   Social History     Tobacco Use    Smoking status: Never Smoker    Smokeless tobacco: Never Used   Substance Use Topics    Alcohol use: Yes     Comment: occas, none recently    Drug use: No       Family History:   History reviewed. No pertinent family history.      Medications: Scheduled Meds:   albuterol-ipratropium  3 mL Nebulization Q6H    cetirizine  10 mg Oral Daily    dextromethorphan-guaifenesin  mg  1 tablet Oral BID    insulin aspart U-100  15 Units Subcutaneous TIDWM    insulin detemir U-100  50 Units Subcutaneous QHS    losartan  50 mg Oral Daily    oseltamivir  75 mg Oral BID    [START ON 1/2/2020] predniSONE  40 mg Oral Daily     Continuous Infusions:  PRN Meds:.acetaminophen, Dextrose 10% Bolus, Dextrose 10% Bolus, glucagon (human recombinant), glucose, glucose, insulin aspart U-100, melatonin, ondansetron, ondansetron, polyethylene glycol, promethazine-codeine 6.25-10 mg/5 ml, sodium chloride 0.9%    Allergies: Patient is allergic to vicodin [hydrocodone-acetaminophen].    Physical Exam:     Vital Signs (Most Recent):  Temp: 98.9 °F (37.2 °C) (01/01/20  1515)  Pulse: 87 (01/01/20 1515)  Resp: 18 (01/01/20 1515)  BP: 129/60 (01/01/20 1515)  SpO2: 96 % (01/01/20 1515) Vital Signs Range (Last 24H):  Temp:  [98.9 °F (37.2 °C)-100.4 °F (38 °C)]   Pulse:  []   Resp:  [18-20]   BP: (129-147)/(60-78)   SpO2:  [94 %-98 %]    Body mass index is 40.22 kg/m².     Physical Exam:  Constitutional: Well-developed, well-nourished, non-distressed, not diaphoretic.   HENT: NC/AT, external ears normal, oropharynx clear, MMM w/o exudates.   Eyes: PERRL, EOMI, conjunctiva normal, no discharge b/l, no scleral icterus   Neck: normal ROM, supple,  CV: RRR, no m/r/g, no carotid bruits, +2 peripheral pulses.  Pulmonary/Chest wall: Breathing comfortably w/o distress, no resp distress, not using excessory muscles of respiration, diffuse expiratory wheezes   GI: Soft, non-tender, non-distended, (+) BS, (+) BM   Musculoskeletal: Normal ROM, no edema, no atrophy, no tenderness throughout   Neurological: AAO x 4, CN II-XI in tact, nl sensation, nl strength/tone   Skin: warm, dry, (-) erythema, (-) rash, (-)pallor  Psych: normal mood and affect, normal behavior, thought content and judgement.  Vitals reviewed.    Labs:    Chemistries:   Recent Labs   Lab 01/01/20  1604   *   K 3.9      CO2 25   BUN 11   CREATININE 1.0   CALCIUM 9.0        POCT Glucose: HbA1c:    Recent Labs   Lab 01/01/20  1330 01/01/20  1612   POCTGLUCOSE 248* 191*    Hemoglobin A1C   Date Value Ref Range Status   01/01/2020 10.3 (H) 4.0 - 5.6 % Final     Comment:   09/30/2018 10.4 (H) 4.0 - 5.6 % Final     Comment:            Assessment and Plan:     Mr. Clay Waters is a 39 y.o. male who presented to [facility] on 1/1/2020 with asthma exacerbation due to influenza B    Active Hospital Problems    Diagnosis  POA    *Asthma exacerbation [J45.901]  Yes     Priority: 1 - High    Influenza B [J10.1]  Yes     Priority: 1 - High    Type 1 diabetes mellitus with hyperglycemia [E10.65]  Yes     Priority: 2      "Uncontrolled type 1 diabetes mellitus with hyperglycemia [E10.65]  Yes    Class 3 severe obesity due to excess calories with serious comorbidity in adult [E66.01]  Yes    Essential hypertension [I10]  Yes    Insulin pump in place [Z96.41]  Not Applicable      Resolved Hospital Problems   No resolved problems to display.       Asthma exacerbation  Influenza B  - Has hx of childhood asthma, which resolved as an adult, no recent exacerbations.  - reactive airway dz / symptoms/ asthma exacerbation due to influenza B  - tamiflu 75 mg BID for 5 days  - s/p 125 mg solumedrol in the ED on 1/1, cont with prednisone 40mg for 5-7 days  - scheduled duonebs  - cough control with mucinex and codeine cough syrup     Uncontrolled type 1 diabetes mellitus with hyperglycemia  Type 1 diabetes mellitus with hyperglycemia  - uses approx 120-130 units of novolog, and carb counting with meals. Sees an [place] endocrinologist.  States that his DM Is "pretty well controlled". A1C of 10  - DM and hyperglycemia education provided.   - hold insulin pump while inpatient  - will plan for deter 50U QHS and 15 U TID with meals. Up titrate based on POCT glucose mitch with steroid use  - nutrition consulted for dietary education   - check lipid panel  - will plan to refer to [facility] for second opinion for DM and DM 1 management.        Essential hypertension  - cont home losartan      Class 3 severe obesity due to excess calories with serious comorbidity in adult  - Body mass index is 40.22 kg/m².   - nutrition consulted for dietary education . Especially in setting of DM     Diet:  diabetic  GI PPx:    DVT PPx:  ambulatory  Goals of Care:      High Risk Conditions:  Asthma exacerbation     Disposition:    Possibly 1/2      Patient's note was created using MModal Dictation.  Any errors in syntax may not have been identified and edited on initial review prior to signing this note.    Signing Physician:     Shy Craig, " MD  Department of [facility]   [facility]  Pager [phone]  [facility] [id number]  1/1/2020

## 2020-01-02 NOTE — PLAN OF CARE
01/02/20 1455   Final Note   Assessment Type Final Discharge Note   Anticipated Discharge Disposition Home   What phone number can be called within the next 1-3 days to see how you are doing after discharge? [phone]   Discharge plans and expectations educations in teach back method with documentation complete? Yes   Right Care Referral Info   Post Acute Recommendation No Care     Future Appointments   Date Time Provider Department Center   1/13/2020  1:00 PM Forest Monroe MD [facility]

## 2020-01-02 NOTE — PLAN OF CARE
Pt with no falls or injuries this hospital stay. Pt with temp max 99. Continues on tamaflu. Airborn precautions maintained

## 2020-01-02 NOTE — CONSULTS
Food & Nutrition  Education    Diet Education: Diabetic   Learners: Pt       Nutrition Education provided with handouts: Meal Planning Using the Plate Method       Comments: Pt resting in chair with no family members at bedside. Pt reports previous diabetic diet education in the past and follows a diabetic diet. States that he CHO counts and takes insulin at home. Dicussed A1c of 10.3. Pt reports eating mostly protein and vegetables, but it is hard to worry about what he is eating with 5 people in the household. States that he lifts weights and is trying to loss weight. Spoke about CHO counting and avoiding skipping meals. Pt verbalized understanding. RD encouraged pt to outpatient RD to help with specific meal planning.       All questions and concerns answered.      Follow-Up: Yes     Please re-consult as needed.

## 2020-01-02 NOTE — DISCHARGE INSTRUCTIONS
Flu precautions at home  Scheduled albuterol nebulizer / inhaler three times per day while on tamiflu/ steroids, then as needed for wheezing after that  Family should be on tamiflu prophylaxis  Endocrinology follow up for diabetes

## 2020-01-02 NOTE — PLAN OF CARE
PLAN OF CARE REVIEWED WITH PT.  PT AA+OX4.  ABLE TO MAKE NEEDS KNOWN.  DOES NOT APPEAR TO BE IN ANY DISTRESS.  C/O PAIN.  PAIN MEDICATION GIVEN AS ORDERED.  INDEPENDENT WITH ADLS.  CONT. OF B/B.  PT REMAINS FREE FROM FALLS, INJURY, AND TRAUMA.  FALL PRECAUTIONS IN PLACE.  BED IN LOWEST POSITION WITH WHEELS LOCKED.  CALL LIGHT WITHIN REACH.  WILL CONTINUE TO MONITOR.

## 2020-01-13 ENCOUNTER — IMMUNIZATION (OUTPATIENT)
Dept: PHARMACY | Facility: CLINIC | Age: 40
End: 2020-01-13
Payer: COMMERCIAL

## 2020-01-13 ENCOUNTER — OFFICE VISIT (OUTPATIENT)
Dept: INTERNAL MEDICINE | Facility: CLINIC | Age: 40
End: 2020-01-13
Payer: COMMERCIAL

## 2020-01-13 ENCOUNTER — IMMUNIZATION (OUTPATIENT)
Dept: PHARMACY | Facility: CLINIC | Age: 40
End: 2020-01-13

## 2020-01-13 VITALS
SYSTOLIC BLOOD PRESSURE: 120 MMHG | OXYGEN SATURATION: 97 % | DIASTOLIC BLOOD PRESSURE: 80 MMHG | BODY MASS INDEX: 40.02 KG/M2 | HEART RATE: 90 BPM | HEIGHT: 72 IN | WEIGHT: 295.44 LBS

## 2020-01-13 DIAGNOSIS — I10 ESSENTIAL HYPERTENSION: ICD-10-CM

## 2020-01-13 DIAGNOSIS — E66.01 CLASS 3 SEVERE OBESITY DUE TO EXCESS CALORIES WITH SERIOUS COMORBIDITY AND BODY MASS INDEX (BMI) OF 40.0 TO 44.9 IN ADULT: ICD-10-CM

## 2020-01-13 DIAGNOSIS — J45.21 MILD INTERMITTENT ASTHMA WITH EXACERBATION: ICD-10-CM

## 2020-01-13 DIAGNOSIS — Z96.41 INSULIN PUMP IN PLACE: ICD-10-CM

## 2020-01-13 DIAGNOSIS — E10.65 TYPE 1 DIABETES MELLITUS WITH HYPERGLYCEMIA: Primary | ICD-10-CM

## 2020-01-13 PROBLEM — J10.1 INFLUENZA B: Status: RESOLVED | Noted: 2020-01-01 | Resolved: 2020-01-13

## 2020-01-13 PROBLEM — L03.221 CELLULITIS OF NECK: Status: RESOLVED | Noted: 2018-09-30 | Resolved: 2020-01-13

## 2020-01-13 PROCEDURE — 3079F DIAST BP 80-89 MM HG: CPT | Mod: CPTII,S$GLB,, | Performed by: INTERNAL MEDICINE

## 2020-01-13 PROCEDURE — 3046F HEMOGLOBIN A1C LEVEL >9.0%: CPT | Mod: CPTII,S$GLB,, | Performed by: INTERNAL MEDICINE

## 2020-01-13 PROCEDURE — 3046F PR MOST RECENT HEMOGLOBIN A1C LEVEL > 9.0%: ICD-10-PCS | Mod: CPTII,S$GLB,, | Performed by: INTERNAL MEDICINE

## 2020-01-13 PROCEDURE — 3079F PR MOST RECENT DIASTOLIC BLOOD PRESSURE 80-89 MM HG: ICD-10-PCS | Mod: CPTII,S$GLB,, | Performed by: INTERNAL MEDICINE

## 2020-01-13 PROCEDURE — 3074F SYST BP LT 130 MM HG: CPT | Mod: CPTII,S$GLB,, | Performed by: INTERNAL MEDICINE

## 2020-01-13 PROCEDURE — 99999 PR PBB SHADOW E&M-EST. PATIENT-LVL IV: ICD-10-PCS | Mod: PBBFAC,,, | Performed by: INTERNAL MEDICINE

## 2020-01-13 PROCEDURE — 3008F PR BODY MASS INDEX (BMI) DOCUMENTED: ICD-10-PCS | Mod: CPTII,S$GLB,, | Performed by: INTERNAL MEDICINE

## 2020-01-13 PROCEDURE — 3008F BODY MASS INDEX DOCD: CPT | Mod: CPTII,S$GLB,, | Performed by: INTERNAL MEDICINE

## 2020-01-13 PROCEDURE — 99214 PR OFFICE/OUTPT VISIT, EST, LEVL IV, 30-39 MIN: ICD-10-PCS | Mod: S$GLB,,, | Performed by: INTERNAL MEDICINE

## 2020-01-13 PROCEDURE — 3074F PR MOST RECENT SYSTOLIC BLOOD PRESSURE < 130 MM HG: ICD-10-PCS | Mod: CPTII,S$GLB,, | Performed by: INTERNAL MEDICINE

## 2020-01-13 PROCEDURE — 99999 PR PBB SHADOW E&M-EST. PATIENT-LVL IV: CPT | Mod: PBBFAC,,, | Performed by: INTERNAL MEDICINE

## 2020-01-13 PROCEDURE — 99214 OFFICE O/P EST MOD 30 MIN: CPT | Mod: S$GLB,,, | Performed by: INTERNAL MEDICINE

## 2020-01-13 NOTE — PROGRESS NOTES
[facility]  New Visit Progress Note   Recent Hospital Discharge     PRESENTING HISTORY     Chief Complaint/Reason for Visit:  Follow up Hospital Discharge (  Chief Complaint   Patient presents with    Follow-up     PCP: Spencer Hernandez MD     History of Present Illness: Mr. Clay Waters is a 39 y.o. male who was recently admitted to the hospital.  Team: [facility] MED V     Admit Date: 1/1/2020  Discharge Date: 01/02/2020  Discharge Attending Physician: Shy Craig MD   Chief Complaint: Asthma exacerbation due to influenza      Princilpal Diagnoses:         Active Hospital Problems     Diagnosis   POA    *Asthma exacerbation [J45.901]   Yes       Priority: 1 - High    Influenza B [J10.1]   Yes       Priority: 1 - High    Type 1 diabetes mellitus with hyperglycemia [E10.65]   Yes       Priority: 2     Uncontrolled type 1 diabetes mellitus with hyperglycemia [E10.65]   Yes    Class 3 severe obesity due to excess calories with serious comorbidity in adult [E66.01]   Yes    Essential hypertension [I10]   Yes    Insulin pump in place [Z96.41]   Not Applicable         HOSPITAL COURSE:       Initial Presentation:     39 y.o. male with uncontrolled T1DM (A1C 10, on insulin pump at home), HTN, and childhood asthma (that has resolved in adulthood), presented to the ED on 1/1/20 with 2-3 day hx of worsening cough and 2 day hx of audible wheezing. Patient started having cough on 12/30 and was seen in urgent care and was prescribed albuterol and prednisone 10mg TID , was not tested for flu at that time.  Patient has not been having sore throat, muscles aches, or fevers.  Patient's worsening wheezing brought him to the ED. He used his son's nebulizer at home, but it did not improve his symptoms. Has hx of childhood asthma, which resolved as an adult, no recent exacerbations. Patient's family (wife and children) had flu B couple of months ago, but otherwise no recent sick contacts.  Did not receive a  "flu vaccine this year. Hx of uncontrolled DM, uses insulin pump at home , uses approx 120-130 units of novolog, and carb counting with meals. Sees an [place] endocrinologist.  States that his DM Is "pretty well controlled". A1C of 10     In the ED, noted to have diffuse wheezing, glucose in 200s, satting well on RA, flu B + . Received tamiflu and 125mg IV solumedrol      Course of Principle Problem for Admission:     Asthma exacerbation  Influenza B  - Has hx of childhood asthma, which resolved as an adult, no recent exacerbations.  - reactive airway dz / symptoms/ asthma exacerbation due to influenza B  - tamiflu 75 mg BID for 5 days  - s/p 125 mg solumedrol in the ED on 1/1, cont with prednisone 40mg for 5-7 days  - scheduled duonebs  - cough control with mucinex and codeine cough syrup      On the day of d/c, patient was doing well with no acute issues.   No longer hearing himself wheeze, not requiring supplemental O2. Feels better.    D/c home with tamiflu and prednisone . Recommended scheduled nebulizers TID while on steroids and then PRN.   Recommended his family (espcially child with poorly controlled asthma ) to get flu ppx with tamiflu   [facility] referral       Other Medical Problems Addressed in the Hospital:     Uncontrolled type 1 diabetes mellitus with hyperglycemia  Type 1 diabetes mellitus with hyperglycemia  - uses approx 120-130 units of novolog, and carb counting with meals. Sees an [place] endocrinologist.  States that his DM Is "pretty well controlled". A1C of 10  - DM and hyperglycemia education provided.   - hold insulin pump while inpatient  - will plan for deter 50U QHS and 15 U TID with meals. Up titrate based on POCT glucose mitch with steroid use  - nutrition consulted for dietary education   - lipid panel reviewed   -  refer to [facility] for second opinion for DM and DM 1 management.     - patient stated that he has been watching his diet and exercising and has lost " weight, however, diabetes has not been improving with these changes      Essential hypertension  - cont home losartan      Class 3 severe obesity due to excess calories with serious comorbidity in adult  - Body mass index is 40.22 kg/m².   - nutrition consulted for dietary education . Especially in setting of DM   - patient stated that he has been watching his diet and exercising and has lost weight, however, diabetes has not been improving with these changes      ___________________________________________________________________    Today:  Still coughing. Occasional clear sputum.  He has been using inhaler in the morning.  Not SOB.    Blood sugar better.   This morning was 102.  Yesterday 140 - 190 - 210.    Insulin Pump: 1.7 at 12 am, 3.3 at 3 am, 2.15 at 8 am, 2.15 at 1 pm, 2.3 at 6 pm, 2.05 at 10 pm.     Was seeing Dr. Hernandez every 3-4 months.    Last asthma attack in years prior to the flu during New Year.    PAST HISTORY:     Past Medical History:   Diagnosis Date    Childhood asthma     Class 3 severe obesity due to excess calories with serious comorbidity in adult 1/1/2020    Essential hypertension 10/1/2018    Influenza B 1/1/2020    Insulin pump in place 10/1/2018    Lung nodule 10/2/2018    <1cm multiple lung nodules in the setting of staph infection LTNS No history of cancer    Mild intermittent asthma with exacerbation 1/1/2020    Type 1 diabetes mellitus with hyperglycemia 10/1/2018       No past surgical history on file.    No family history on file.    Social History     Socioeconomic History    Marital status:      Spouse name: Not on file    Number of children: Not on file    Years of education: Not on file    Highest education level: Not on file   Occupational History    Not on file   Social Needs    Financial resource strain: Not on file    Food insecurity:     Worry: Not on file     Inability: Not on file    Transportation needs:     Medical: Not on file     Non-medical:  Not on file   Tobacco Use    Smoking status: Never Smoker    Smokeless tobacco: Never Used   Substance and Sexual Activity    Alcohol use: Yes     Comment: occas, none recently    Drug use: No    Sexual activity: Not on file   Lifestyle    Physical activity:     Days per week: Not on file     Minutes per session: Not on file    Stress: Not on file   Relationships    Social connections:     Talks on phone: Not on file     Gets together: Not on file     Attends [employer] service: Not on file     Active member of club or organization: Not on file     Attends meetings of clubs or organizations: Not on file     Relationship status: Not on file   Other Topics Concern    Not on file   Social History Narrative    Not on file       MEDICATIONS & ALLERGIES:     Current Outpatient Medications on File Prior to Visit   Medication Sig Dispense Refill    albuterol (ACCUNEB) 1.25 mg/3 mL Nebu Take 3 mLs (1.25 mg total) by nebulization 3 (three) times daily. 3 times per day while on tamiflu/steroids, then as needed 90 mL 2    albuterol (VENTOLIN HFA) 90 mcg/actuation inhaler Inhale 2 puffs into the lungs every 6 (six) hours as needed for Wheezing. Rescue 18 g 1    cetirizine (ZYRTEC) 10 MG tablet Take 10 mg by mouth once daily.      INSULIN PUMP CARTRIDGE SUBQ Inject into the skin.      losartan (COZAAR) 50 MG tablet Take 50 mg by mouth once daily.      NOVOLOG U-100 INSULIN ASPART 100 unit/mL injection        Review of patient's allergies indicates:   Allergen Reactions    Vicodin [hydrocodone-acetaminophen] Nausea And Vomiting       OBJECTIVE:     Vital Signs:  Vitals:    01/13/20 1306   BP: 120/80   Pulse: 90     Wt Readings from Last 1 Encounters:   01/13/20 1306 134 kg (295 lb 6.7 oz)     Body mass index is 40.07 kg/m².     Physical Exam:  General: Well developed, well nourished. No distress.  HEENT: Head is normocephalic, atraumatic   Eyes: Clear conjunctiva.  Neck: Supple, symmetrical neck; trachea  midline.  Lungs: Clear to auscultation bilaterally and normal respiratory effort.  Cardiovascular: Heart with regular rate and rhythm.    Extremities: No LE edema.    Abdomen: Abdomen is soft, non-tender non-distended with normal bowel sounds.  Skin: Skin color, texture, turgor normal. No rashes.  Musculoskeletal: Normal gait.   Neurologic: Normal strength and tone. No focal numbness or weakness.   Psychiatric: Normal affect. Alert.     Laboratory  Lab Results   Component Value Date    WBC 14.80 (H) 01/02/2020    HGB 13.8 (L) 01/02/2020    HCT 42.6 01/02/2020    MCV 84 01/02/2020     01/02/2020     BMP  Lab Results   Component Value Date     01/02/2020    K 4.0 01/02/2020     01/02/2020    CO2 27 01/02/2020    BUN 16 01/02/2020    CREATININE 1.0 01/02/2020    CALCIUM 8.8 01/02/2020    ANIONGAP 9 01/02/2020    ESTGFRAFRICA >60.0 01/02/2020    EGFRNONAA >60.0 01/02/2020     Lab Results   Component Value Date    ALT 12 11/09/2017    AST 14 11/09/2017    ALKPHOS 103 11/09/2017    BILITOT 0.8 11/09/2017     Lab Results   Component Value Date    INR 1.0 09/30/2018     Lab Results   Component Value Date    HGBA1C 10.3 (H) 01/01/2020     Results for EMBER SALGUERO (MRN [id number]) as of 1/13/2020 12:44   Ref. Range 9/30/2018 21:19 9/30/2018 21:29 10/1/2018 08:07 1/1/2020 14:44   Hemoglobin A1C External Latest Ref Range: 4.0 - 5.6 % 10.4 (H)   10.3 (H)   Estimated Avg Glucose Latest Ref Range: 68 - 131 mg/dL 252 (H)   249 (H)       TRANSITION OF CARE:     Transition of Care Visit:     I have reviewed and updated the history and problem list.  I have reconciled the medication list.  I have discussed the hospitalization and current medical issues, prognosis and plans with the patient/family.  I  spent more than 50% of time discussing the care with the patient/family.  Total Face-to-Face Encounter in the [facility]: 40 minutes.    Medications Reconciliation:   I have reconciled the patient's home  medications and discharge medications with the patient/family. I have updated all changes.  Refer to After-Visit Medication List.    ASSESSMENT & PLAN:     Type 1 diabetes mellitus with hyperglycemia  Insulin pump in place  - He is on insulin pump.  Followed by  Endocrine for past two years.    However, A1c has not changed in two years.    He will get [facility] opinion with [facility] next month and decide whether to change PCP.    Mild intermittent asthma with exacerbation  - Resolving well.    Will only need PRN albuterol.    Essential hypertension  - Controlled on Losartan.    Class 3 severe obesity due to excess calories with serious comorbidity and body mass index (BMI) of 40.0 to 44.9 in adult  Body mass index is 40.07 kg/m².      Instructions for the patient: Vaccine: Rx for Flu and Pneumovax 23.    Scheduled Follow-up :  Future Appointments   Date Time Provider Department Center   2/5/2020  9:00 AM Zully Nation RN [facility] Hardy [facility]       After Visit Medication List :     Medication List           Accurate as of January 13, 2020  1:33 PM. If you have any questions, ask your nurse or doctor.               START taking these medications    flu vacc [id number] 6mos up(PF) 60 mcg (15 mcg x 4)/0.5 mL Syrg  Commonly known as:  Fluarix Quad [id number] (PF)  Inject 0.5 mLs into the muscle once. For one dose. for 1 dose     pneumococcal 23-johnny ps 25 mcg/0.5 mL vaccine  Commonly known as:  Pneumovax 23  Inject 0.5 mLs into the muscle once. For one dose. for 1 dose        CHANGE how you take these medications    * albuterol 90 mcg/actuation inhaler  Commonly known as:  Ventolin HFA  Inhale 2 puffs into the lungs every 6 (six) hours as needed for Wheezing. Rescue  What changed:  Another medication with the same name was removed. Continue taking this medication, and follow the directions you see here.  Changed by:  Forest Monroe MD     * albuterol 1.25 mg/3 mL Nebu  Commonly known as:  ACCUNEB  Take 3 mLs  (1.25 mg total) by nebulization 3 (three) times daily. 3 times per day while on tamiflu/steroids, then as needed  What changed:  Another medication with the same name was removed. Continue taking this medication, and follow the directions you see here.  Changed by:  Forest Monroe MD         * This list has 2 medication(s) that are the same as other medications prescribed for you. Read the directions carefully, and ask your doctor or other care provider to review them with you.            CONTINUE taking these medications    cetirizine 10 MG tablet  Commonly known as:  ZYRTEC     INSULIN PUMP CARTRIDGE SUBQ     losartan 50 MG tablet  Commonly known as:  COZAAR     NovoLOG U-100 Insulin aspart 100 unit/mL injection  Generic drug:  insulin aspart U-100        STOP taking these medications    promethazine-codeine 6.25-10 mg/5 ml 6.25-10 mg/5 mL syrup  Commonly known as:  PHENERGAN with CODEINE  Stopped by:  Forest Monroe MD     promethazine-dextromethorphan 6.25-15 mg/5 mL Syrp  Commonly known as:  PROMETHAZINE-DM  Stopped by:  Forest Monroe MD           Where to Get Your Medications      These medications were sent to [facility]  [address] [place] [id number]    Hours:  Mon-Fri, 8a-5:30p Phone:  [phone]   · flu vacc [id number] 6mos up(PF) 60 mcg (15 mcg x 4)/0.5 mL Syrg  · pneumococcal 23-johnny ps 25 mcg/0.5 mL vaccine           Signing Physician:  Forest Monroe MD

## 2020-03-26 ENCOUNTER — PATIENT OUTREACH (OUTPATIENT)
Dept: DIABETES | Facility: CLINIC | Age: 40
End: 2020-03-26

## 2020-07-10 ENCOUNTER — OFFICE VISIT (OUTPATIENT)
Dept: URGENT CARE | Facility: CLINIC | Age: 40
End: 2020-07-10
Payer: COMMERCIAL

## 2020-07-10 VITALS
DIASTOLIC BLOOD PRESSURE: 75 MMHG | TEMPERATURE: 99 F | HEIGHT: 72 IN | WEIGHT: 290 LBS | BODY MASS INDEX: 39.28 KG/M2 | SYSTOLIC BLOOD PRESSURE: 121 MMHG | RESPIRATION RATE: 20 BRPM | OXYGEN SATURATION: 96 % | HEART RATE: 103 BPM

## 2020-07-10 DIAGNOSIS — Z11.59 SCREENING FOR VIRAL DISEASE: Primary | ICD-10-CM

## 2020-07-10 PROCEDURE — 99214 PR OFFICE/OUTPT VISIT, EST, LEVL IV, 30-39 MIN: ICD-10-PCS | Mod: S$GLB,,, | Performed by: FAMILY MEDICINE

## 2020-07-10 PROCEDURE — 99214 OFFICE O/P EST MOD 30 MIN: CPT | Mod: S$GLB,,, | Performed by: FAMILY MEDICINE

## 2020-07-10 PROCEDURE — U0003 INFECTIOUS AGENT DETECTION BY NUCLEIC ACID (DNA OR RNA); SEVERE ACUTE RESPIRATORY SYNDROME CORONAVIRUS 2 (SARS-COV-2) (CORONAVIRUS DISEASE [COVID-19]), AMPLIFIED PROBE TECHNIQUE, MAKING USE OF HIGH THROUGHPUT TECHNOLOGIES AS DESCRIBED BY CMS-2020-01-R: HCPCS

## 2020-07-10 RX ORDER — BENZONATATE 100 MG/1
100 CAPSULE ORAL EVERY 6 HOURS PRN
Qty: 30 CAPSULE | Refills: 1 | Status: SHIPPED | OUTPATIENT
Start: 2020-07-10 | End: 2021-04-11

## 2020-07-10 RX ORDER — ALBUTEROL SULFATE 90 UG/1
2 AEROSOL, METERED RESPIRATORY (INHALATION) EVERY 6 HOURS PRN
Qty: 18 G | Refills: 0 | Status: SHIPPED | OUTPATIENT
Start: 2020-07-10 | End: 2021-07-10

## 2020-07-10 NOTE — PATIENT INSTRUCTIONS

## 2020-07-10 NOTE — LETTER
July 10, 2020      [facility]  [address]  [facility] [address]  Phone: [phone]  Fax: [phone]       Patient: Clay Waters   YOB: 1980  Date of Visit: 07/10/2020    To Whom It May Concern:    DOYLE Waters  was at [facility] on 07/10/2020. He may return to work/school on 07/17/2020 with restrictions if his COVID-19 testing is negative. If you have any questions or concerns, or if I can be of further assistance, please do not hesitate to contact me.    Sincerely,            Jerald Curtis MD

## 2020-07-10 NOTE — PROGRESS NOTES
Subjective:       Patient ID: Clay Waters is a 40 y.o. male.    Vitals:  height is 6' (1.829 m) and weight is 131.5 kg (290 lb). His temperature is 98.7 °F (37.1 °C). His blood pressure is 121/75 and his pulse is 103. His respiration is 20 and oxygen saturation is 96%.     Chief Complaint: COVID-19 Concerns    This is a 40 y.o. male   with Past Medical History:  No date: Childhood asthma  1/1/2020: Class 3 severe obesity due to excess calories with serious   comorbidity in adult  10/1/2018: Essential hypertension  1/1/2020: Influenza B  10/1/2018: Insulin pump in place  10/2/2018: Lung nodule      Comment:  <1cm multiple lung nodules in the setting of staph                infection LTNS No history of cancer  1/1/2020: Mild intermittent asthma with exacerbation  10/1/2018: Type 1 diabetes mellitus with hyperglycemia   and History reviewed. No pertinent surgical history.  who presents today with a chief complaint of COVID 19 concerns that began two days ago. He's complaining of chest tightness, shortness of breath, cough, sore throat and body aches. He hasn't taken any medication to help relieve his symptoms. Hx of asthma. Fever of 100.5 per patient 2 nights ago    Cough  This is a new problem. The current episode started in the past 7 days. The problem has been gradually worsening. The problem occurs every few minutes. The cough is non-productive. Associated symptoms include myalgias, a sore throat and shortness of breath. Pertinent negatives include no chills, ear pain, eye redness, fever, hemoptysis, rash or wheezing. Nothing aggravates the symptoms. He has tried nothing for the symptoms.       Constitution: Negative for chills, sweating, fatigue and fever.   HENT: Positive for sore throat. Negative for ear pain, congestion, sinus pain, sinus pressure and voice change.    Neck: Negative for painful lymph nodes.   Eyes: Negative for eye redness.   Respiratory: Positive for chest tightness, cough and  shortness of breath. Negative for sputum production, bloody sputum, COPD, stridor, wheezing and asthma.    Gastrointestinal: Negative for nausea and vomiting.   Musculoskeletal: Positive for muscle ache.   Skin: Negative for rash.   Allergic/Immunologic: Negative for seasonal allergies and asthma.   Hematologic/Lymphatic: Negative for swollen lymph nodes.       Objective:      Physical Exam   Constitutional:  Non-toxic appearance. He appears ill.   HENT:   Head: Normocephalic and atraumatic.   Nose: Congestion present.   Mouth/Throat: Mucous membranes are moist. No posterior oropharyngeal erythema.   Eyes: Pupils are equal, round, and reactive to light.   Cardiovascular: Normal rate, regular rhythm, normal heart sounds and normal pulses.   Pulmonary/Chest: Effort normal. He has wheezes (scant).   Abdominal: Normal appearance.   Neurological: He is alert.   Nursing note and vitals reviewed.        Assessment:       1. Screening for viral disease        Plan:         Screening for viral disease  -     COVID-19 Routine Screening  -     COVID-19 Home Symptom Monitoring  - Duration (days): 14  -     albuterol (PROAIR HFA) 90 mcg/actuation inhaler; Inhale 2 puffs into the lungs every 6 (six) hours as needed for Wheezing. Rescue  Dispense: 18 g; Refill: 0  -     benzonatate (TESSALON PERLES) 100 MG capsule; Take 1 capsule (100 mg total) by mouth every 6 (six) hours as needed for Cough.  Dispense: 30 capsule; Refill: 1     Vitals stable. Minimal evidence of respiratory distress noted, able to speak in complete sentences without pause.     Requesting COVID-19 testing post exposure.   Tested for COVID-19 today. Educated on COVID-19 and COVID-19 testing. Advised on COVID-19 precautions. Advised on return/follow-up precautions. Advised on ER precautions. Answered all patient questions. Patient verbalized understanding and voiced agreement with current treatment plan.

## 2020-07-13 ENCOUNTER — NURSE TRIAGE (OUTPATIENT)
Dept: ADMINISTRATIVE | Facility: CLINIC | Age: 40
End: 2020-07-13

## 2020-07-13 LAB — SARS-COV-2 RNA RESP QL NAA+PROBE: NOT DETECTED

## 2020-07-13 NOTE — TELEPHONE ENCOUNTER
RN contacted pt through the Covid Home Symptom Monitoring Program.  Pt stated that he has been having a persistent cough and VIRK.  Denies any fever at this time.  Pt mentioned that he is taking tessalon perles for his cough and he uses an inhaler when he get sob.  Pt said that he is getting some relief and is not having any sob at rest.  Home care advised.  Pt instructed by RN to call [facility] if he has any further questions or concerns and to report to the nearest ED should his symptoms get worse and are not manageable in the home.  Pt verbalized understanding to all.    Reason for Disposition   [1] COVID-19 diagnosed by HCP (doctor, NP or PA) AND [2] mild symptoms (e.g., cough, fever, others) AND [3] no complications or SOB    Additional Information   Negative: SEVERE difficulty breathing (e.g., struggling for each breath, speaks in single words)   Negative: Difficult to awaken or acting confused (e.g., disoriented, slurred speech)   Negative: Bluish (or gray) lips or face now   Negative: Shock suspected (e.g., cold/pale/clammy skin, too weak to stand, low BP, rapid pulse)   Negative: Sounds like a life-threatening emergency to the triager   Negative: [1] COVID-19 exposure AND [2] NO symptoms   Negative: COVID-19 and Breastfeeding, questions about   Negative: [1] Adult with possible COVID-19 symptoms AND [2] triager concerned about severity of symptoms or other causes   Negative: SEVERE or constant chest pain or pressure (Exception: mild central chest pain, present only when coughing)   Negative: MODERATE difficulty breathing (e.g., speaks in phrases, SOB even at rest, pulse 100-120)   Negative: MILD difficulty breathing (e.g., minimal/no SOB at rest, SOB with walking, pulse <100)   Negative: Chest pain   Negative: Patient sounds very sick or weak to the triager   Negative: Fever > 103 F (39.4 C)   Negative: [1] Fever > 101 F (38.3 C) AND [2] age > 60   Negative: [1] Fever > 100.0 F (37.8 C) AND [2]  bedridden (e.g., nursing home patient, CVA, chronic illness, recovering from surgery)   Negative: HIGH RISK patient (e.g., age > 64 years, diabetes, heart or lung disease, weak immune system)   Negative: [1] COVID-19 infection suspected by caller or triager AND [2] mild symptoms (cough, fever, or others) AND [3] no complications or SOB   Negative: Fever present > 3 days (72 hours)   Negative: [1] Fever returns after gone for over 24 hours AND [2] symptoms worse or not improved   Negative: [1] Continuous (nonstop) coughing interferes with work or school AND [2] no improvement using cough treatment per protocol   Negative: Cough present > 3 weeks    Protocols used: CORONAVIRUS (COVID-19) DIAGNOSED OR [id number]

## 2020-07-14 ENCOUNTER — TELEPHONE (OUTPATIENT)
Dept: URGENT CARE | Facility: CLINIC | Age: 40
End: 2020-07-14

## 2020-07-14 ENCOUNTER — OFFICE VISIT (OUTPATIENT)
Dept: INTERNAL MEDICINE | Facility: CLINIC | Age: 40
End: 2020-07-14
Payer: COMMERCIAL

## 2020-07-14 DIAGNOSIS — Z96.41 INSULIN PUMP IN PLACE: ICD-10-CM

## 2020-07-14 DIAGNOSIS — E10.65 TYPE 1 DIABETES MELLITUS WITH HYPERGLYCEMIA: Primary | ICD-10-CM

## 2020-07-14 DIAGNOSIS — J45.21 MILD INTERMITTENT ASTHMA WITH EXACERBATION: ICD-10-CM

## 2020-07-14 PROCEDURE — 3046F HEMOGLOBIN A1C LEVEL >9.0%: CPT | Mod: CPTII,,, | Performed by: INTERNAL MEDICINE

## 2020-07-14 PROCEDURE — 3046F PR MOST RECENT HEMOGLOBIN A1C LEVEL > 9.0%: ICD-10-PCS | Mod: CPTII,,, | Performed by: INTERNAL MEDICINE

## 2020-07-14 PROCEDURE — 99213 OFFICE O/P EST LOW 20 MIN: CPT | Mod: 95,,, | Performed by: INTERNAL MEDICINE

## 2020-07-14 PROCEDURE — 99213 PR OFFICE/OUTPT VISIT, EST, LEVL III, 20-29 MIN: ICD-10-PCS | Mod: 95,,, | Performed by: INTERNAL MEDICINE

## 2020-07-14 RX ORDER — IBUPROFEN 100 MG/5ML
1000 SUSPENSION, ORAL (FINAL DOSE FORM) ORAL DAILY
COMMUNITY
Start: 2020-07-14 | End: 2022-02-16 | Stop reason: ALTCHOICE

## 2020-07-14 RX ORDER — AZITHROMYCIN 500 MG/1
500 TABLET, FILM COATED ORAL DAILY
Qty: 5 TABLET | Refills: 0 | Status: SHIPPED | OUTPATIENT
Start: 2020-07-14 | End: 2020-07-19

## 2020-07-14 RX ORDER — BUDESONIDE 0.5 MG/2ML
0.5 INHALANT ORAL 2 TIMES DAILY
Qty: 120 ML | Refills: 11 | Status: SHIPPED | OUTPATIENT
Start: 2020-07-14 | End: 2022-02-16 | Stop reason: ALTCHOICE

## 2020-07-14 NOTE — TELEPHONE ENCOUNTER
----- Message from Renata Vogel NP sent at 7/14/2020  2:58 PM CDT -----  Please notify patient of negative covid pcr result.

## 2020-07-14 NOTE — PROGRESS NOTES
INTERNAL MEDICINE CLINIC  TELEMEDICINE ENCOUNTER  Progress Note     PRESENTING HISTORY     PCP: Spencer Hernandez MD    Current Chief Complaint/Problem:  Chest issue. Tested for COVID.    The patient location is: Home  Visit type: Virtual visit with synchronous audio and video    History of Present Illness & ROS: Mr. Clay Waters is a 40 y.o. male.    Started coughing last Thursday.  Dry cough. Exhausted. Weakness.  Positive exposure at work.  No fever.  Some loss of taste or smell.  Breathing is worse from room to room.    He has asthma.   Was hospitalized for Flu in January.     Blood sugar: 325.  Average 200 yesterday.  On insulin pump   12 am: 1.7  4 am: 3.3  8 am: 2.15  1 pm: 2.15  6 pm: 3.2  10 pm: 2.05.       Ref. Range 7/10/2020 13:02   SARS-CoV2 (COVID-19) Qualitative PCR Latest Ref Range: Not Detected  Not Detected       Answers for HPI/ROS submitted by the patient on 7/14/2020   Cough  chest pain: Yes  chills: No  ear congestion: No  ear pain: No  fever: No  headaches: Yes  heartburn: No  hemoptysis: No  myalgias: No  nasal congestion: Yes  postnasal drip: No  rash: No  rhinorrhea: No  shortness of breath: Yes  sore throat: Yes  sweats: No  weight loss: No  wheezing: Yes  Aggravated by: stress  asthma: Yes  bronchitis: Yes  Improvement on treatment: mild    PAST HISTORY:     Past Medical History:   Diagnosis Date    Childhood asthma     Class 3 severe obesity due to excess calories with serious comorbidity in adult 1/1/2020    Essential hypertension 10/1/2018    Influenza B 1/1/2020    Insulin pump in place 10/1/2018    Lung nodule 10/2/2018    <1cm multiple lung nodules in the setting of staph infection LTNS No history of cancer    Mild intermittent asthma with exacerbation 1/1/2020    Type 1 diabetes mellitus with hyperglycemia 10/1/2018       No past surgical history on file.    No family history on file.    Social History     Socioeconomic History    Marital status:      Spouse  name: Not on file    Number of children: Not on file    Years of education: Not on file    Highest education level: Not on file   Occupational History    Not on file   Social Needs    Financial resource strain: Not on file    Food insecurity     Worry: Not on file     Inability: Not on file    Transportation needs     Medical: Not on file     Non-medical: Not on file   Tobacco Use    Smoking status: Never Smoker    Smokeless tobacco: Never Used   Substance and Sexual Activity    Alcohol use: Yes     Comment: occas, none recently    Drug use: No    Sexual activity: Not on file   Lifestyle    Physical activity     Days per week: Not on file     Minutes per session: Not on file    Stress: Not on file   Relationships    Social connections     Talks on phone: Not on file     Gets together: Not on file     Attends Sikh service: Not on file     Active member of club or organization: Not on file     Attends meetings of clubs or organizations: Not on file     Relationship status: Not on file   Other Topics Concern    Not on file   Social History Narrative    Not on file       MEDICATIONS & ALLERGIES:     Current Outpatient Medications on File Prior to Visit   Medication Sig Dispense Refill    albuterol (ACCUNEB) 1.25 mg/3 mL Nebu Take 3 mLs (1.25 mg total) by nebulization 3 (three) times daily. 3 times per day while on tamiflu/steroids, then as needed (Patient not taking: Reported on 7/10/2020) 90 mL 2    albuterol (PROAIR HFA) 90 mcg/actuation inhaler Inhale 2 puffs into the lungs every 6 (six) hours as needed for Wheezing. Rescue 18 g 0    albuterol (VENTOLIN HFA) 90 mcg/actuation inhaler Inhale 2 puffs into the lungs every 6 (six) hours as needed for Wheezing. Rescue (Patient not taking: Reported on 7/10/2020) 18 g 1    benzonatate (TESSALON PERLES) 100 MG capsule Take 1 capsule (100 mg total) by mouth every 6 (six) hours as needed for Cough. 30 capsule 1    cetirizine (ZYRTEC) 10 MG tablet Take  10 mg by mouth once daily.      INSULIN PUMP CARTRIDGE SUBQ Inject into the skin.      losartan (COZAAR) 50 MG tablet Take 50 mg by mouth once daily.      NOVOLOG U-100 INSULIN ASPART 100 unit/mL injection        No current facility-administered medications on file prior to visit.         Review of patient's allergies indicates:   Allergen Reactions    Vicodin [hydrocodone-acetaminophen] Nausea And Vomiting       Medications Reconciliation:   I have reconciled the patient's home medications and discharge medications with the patient/family. I have updated all changes.  Refer to After-Visit Medication List.    OBJECTIVE:     Previous Weight and BMI:  Wt Readings from Last 1 Encounters:   07/10/20 1247 131.5 kg (290 lb)     There is no height or weight on file to calculate BMI.     Physical Exam per Video and/or Audio:  Voice: Normal speech.  General: Well developed, well nourished. No distress.  HEENT: Face is symmetric.    Eyes: Clear conjunctiva.  Lungs: Normal respiratory effort. No accessory muscle use.  Psychiatric: Normal affect. Alert.    Laboratory  Lab Results   Component Value Date    WBC 14.80 (H) 01/02/2020    HGB 13.8 (L) 01/02/2020    HCT 42.6 01/02/2020     01/02/2020    CHOL 144 01/02/2020    TRIG 56 01/02/2020    HDL 54 01/02/2020    ALT 12 11/09/2017    AST 14 11/09/2017     01/02/2020    K 4.0 01/02/2020     01/02/2020    CREATININE 1.0 01/02/2020    BUN 16 01/02/2020    CO2 27 01/02/2020    INR 1.0 09/30/2018    HGBA1C 10.3 (H) 01/01/2020       ASSESSMENT & PLAN:     Type 1 diabetes mellitus with hyperglycemia  Insulin pump in place  - Uncontrolled. Lost Endo follow up due to COVID.    Plan:  Increase insulin by 20%.   Goal 100-150 for now.    Mild intermittent asthma with exacerbation  - Has asthma. Blood sugar uncontrolled.    Negative COVID but could be false negative.     Plan: Increase albuterol to every 4 hr while awake.    -     azithromycin (ZITHROMAX) 500 MG tablet;  Take 1 tablet (500 mg total) by mouth once daily. for 5 days  Dispense: 5 tablet; Refill: 0  -     ascorbic acid, vitamin C, (VITAMIN C) 1000 MG tablet; Take 1 tablet (1,000 mg total) by mouth once daily.  -     budesonide (PULMICORT) 0.5 mg/2 mL nebulizer solution; Take 2 mLs (0.5 mg total) by nebulization 2 (two) times a day. Controller  Dispense: 120 mL; Refill: 11    Go to ED if pulse oximetry < 92% and SOB.  Isolate for 14 days.    Total time spent with patient: 20 min.  Each patient to whom he or she provides medical services by telemedicine is:  (1) informed of the relationship between the physician and patient and the respective role of any other health care provider with respect to management of the patient; and (2) notified that he or she may decline to receive medical services by telemedicine and may withdraw from such care at any time.    After Visit Medication List :     Medication List          Accurate as of July 14, 2020 11:49 AM. If you have any questions, ask your nurse or doctor.            START taking these medications    ascorbic acid (vitamin C) 1000 MG tablet  Commonly known as: VITAMIN C  Take 1 tablet (1,000 mg total) by mouth once daily.  Started by: Forest Monroe MD     azithromycin 500 MG tablet  Commonly known as: ZITHROMAX  Take 1 tablet (500 mg total) by mouth once daily. for 5 days  Started by: Forest Monroe MD     budesonide 0.5 mg/2 mL nebulizer solution  Commonly known as: PULMICORT  Take 2 mLs (0.5 mg total) by nebulization 2 (two) times a day. Controller  Started by: Forest Monroe MD        CONTINUE taking these medications    * albuterol 90 mcg/actuation inhaler  Commonly known as: VENTOLIN HFA  Inhale 2 puffs into the lungs every 6 (six) hours as needed for Wheezing. Rescue     * albuterol 1.25 mg/3 mL Nebu  Commonly known as: ACCUNEB  Take 3 mLs (1.25 mg total) by nebulization 3 (three) times daily. 3 times per day while on tamiflu/steroids, then as needed     * albuterol 90  mcg/actuation inhaler  Commonly known as: PROAIR HFA  Inhale 2 puffs into the lungs every 6 (six) hours as needed for Wheezing. Rescue     benzonatate 100 MG capsule  Commonly known as: TESSALON PERLES  Take 1 capsule (100 mg total) by mouth every 6 (six) hours as needed for Cough.     cetirizine 10 MG tablet  Commonly known as: ZYRTEC     INSULIN PUMP CARTRIDGE SUBQ     losartan 50 MG tablet  Commonly known as: COZAAR     NovoLOG U-100 Insulin aspart 100 unit/mL injection  Generic drug: insulin aspart U-100         * This list has 3 medication(s) that are the same as other medications prescribed for you. Read the directions carefully, and ask your doctor or other care provider to review them with you.               Where to Get Your Medications      These medications were sent to [facility]/pharmacy #[id number] - [facility] [address] [facility]  [address] [address]    Phone: [phone]   · azithromycin 500 MG tablet  · budesonide 0.5 mg/2 mL nebulizer solution     You can get these medications from any pharmacy    You don't need a prescription for these medications  · ascorbic acid (vitamin C) 1000 MG tablet         Signing Physician:  Forest Monroe MD

## 2020-09-09 ENCOUNTER — LAB VISIT (OUTPATIENT)
Dept: LAB | Facility: OTHER | Age: 40
End: 2020-09-09
Attending: INTERNAL MEDICINE
Payer: COMMERCIAL

## 2020-09-09 ENCOUNTER — OFFICE VISIT (OUTPATIENT)
Dept: ENDOCRINOLOGY | Facility: CLINIC | Age: 40
End: 2020-09-09
Payer: COMMERCIAL

## 2020-09-09 VITALS
TEMPERATURE: 98 F | HEIGHT: 72 IN | WEIGHT: 311.94 LBS | HEART RATE: 94 BPM | DIASTOLIC BLOOD PRESSURE: 70 MMHG | OXYGEN SATURATION: 97 % | SYSTOLIC BLOOD PRESSURE: 125 MMHG | BODY MASS INDEX: 42.25 KG/M2

## 2020-09-09 DIAGNOSIS — E66.01 CLASS 3 SEVERE OBESITY DUE TO EXCESS CALORIES WITH SERIOUS COMORBIDITY AND BODY MASS INDEX (BMI) OF 40.0 TO 44.9 IN ADULT: ICD-10-CM

## 2020-09-09 DIAGNOSIS — E10.65 TYPE 1 DIABETES MELLITUS WITH HYPERGLYCEMIA: ICD-10-CM

## 2020-09-09 DIAGNOSIS — E10.65 TYPE 1 DIABETES MELLITUS WITH HYPERGLYCEMIA: Primary | ICD-10-CM

## 2020-09-09 DIAGNOSIS — R29.818 SUSPECTED SLEEP APNEA: ICD-10-CM

## 2020-09-09 DIAGNOSIS — I10 ESSENTIAL HYPERTENSION: ICD-10-CM

## 2020-09-09 DIAGNOSIS — R19.7 DIARRHEA, UNSPECIFIED TYPE: ICD-10-CM

## 2020-09-09 LAB
ANION GAP SERPL CALC-SCNC: 11 MMOL/L (ref 8–16)
BUN SERPL-MCNC: 18 MG/DL (ref 6–20)
CALCIUM SERPL-MCNC: 9.1 MG/DL (ref 8.7–10.5)
CHLORIDE SERPL-SCNC: 108 MMOL/L (ref 95–110)
CO2 SERPL-SCNC: 23 MMOL/L (ref 23–29)
CREAT SERPL-MCNC: 1.2 MG/DL (ref 0.5–1.4)
EST. GFR  (AFRICAN AMERICAN): >60 ML/MIN/1.73 M^2
EST. GFR  (NON AFRICAN AMERICAN): >60 ML/MIN/1.73 M^2
ESTIMATED AVG GLUCOSE: 183 MG/DL (ref 68–131)
GLUCOSE SERPL-MCNC: 79 MG/DL (ref 70–110)
HBA1C MFR BLD HPLC: 8 % (ref 4–5.6)
POTASSIUM SERPL-SCNC: 4 MMOL/L (ref 3.5–5.1)
SODIUM SERPL-SCNC: 142 MMOL/L (ref 136–145)
TSH SERPL DL<=0.005 MIU/L-ACNC: 1.24 UIU/ML (ref 0.4–4)

## 2020-09-09 PROCEDURE — 83516 IMMUNOASSAY NONANTIBODY: CPT | Mod: 59

## 2020-09-09 PROCEDURE — 36415 COLL VENOUS BLD VENIPUNCTURE: CPT

## 2020-09-09 PROCEDURE — 84443 ASSAY THYROID STIM HORMONE: CPT

## 2020-09-09 PROCEDURE — 99214 OFFICE O/P EST MOD 30 MIN: CPT | Mod: S$GLB,,, | Performed by: INTERNAL MEDICINE

## 2020-09-09 PROCEDURE — 80048 BASIC METABOLIC PNL TOTAL CA: CPT

## 2020-09-09 PROCEDURE — 99214 PR OFFICE/OUTPT VISIT, EST, LEVL IV, 30-39 MIN: ICD-10-PCS | Mod: S$GLB,,, | Performed by: INTERNAL MEDICINE

## 2020-09-09 PROCEDURE — 83036 HEMOGLOBIN GLYCOSYLATED A1C: CPT

## 2020-09-09 RX ORDER — INSULIN ASPART 100 [IU]/ML
INJECTION, SOLUTION INTRAVENOUS; SUBCUTANEOUS
Qty: 12 VIAL | Refills: 3 | Status: SHIPPED | OUTPATIENT
Start: 2020-09-09 | End: 2021-08-19

## 2020-09-09 NOTE — ASSESSMENT & PLAN NOTE
BMI 42   - complicated by diabetes   - dm education    - pt working on diet/exercise, encourage him to keep it up   - monitor weight

## 2020-09-09 NOTE — ASSESSMENT & PLAN NOTE
Reviewed goals of therapy - to get the best control we can without hypoglycemia. Goal <7, maybe down to 6.5 if possible   - last A1C well above goal   - since then, pt with new pump and CGM, closed loop system, noted improvements in sugar   - Refer to education, recommend yearly DM education  Medication changes:    continue same regimen at this time  Reviewed patient's current insulin regimen. Clarified proper insulin dose and timing in relation to meals, etc. Insulin injection sites and proper rotation instructed.   -Advised frequent self blood glucose monitoring. Patient encouraged to document glucose results and bring them to every clinic visit  -Close adherence to lifestyle changes recommended.    -Periodic follow ups for eye evaluations, foot care suggested.

## 2020-09-09 NOTE — LETTER
September 9, 2020      Shy Craig MD  [address]  [facility] [address]           [facility] End[facility], [facility] [address] [place] [address]  Phone: [phone]  Fax: [phone]          Patient: Clay Waters   MR Number: [id number]   YOB: 1980   Date of Visit: 9/9/2020       Ivania Craig:    Thank you for referring Clay Waters to me for evaluation. Attached you will find relevant portions of my assessment and plan of care.    Got to see this conchis today from your referral order placed in January. But he's on a fancy new pump, closed loop system, glucose seems reasonable so hopefully he won't have to see y'all in the hospital again for a while.    Hope things have been going well.    Sincerely,    Jesus Healy MD    Enclosure  CC:  No Recipients

## 2020-09-09 NOTE — PROGRESS NOTES
Subjective:      Chief Complaint: Diabetes (type 1) and Establish Care    HPI: Clay Waters is a 40 y.o. male who is here for a follow-up evaluation for diabetes. Last seen 10/2018 in the hospital, though this is his first visit with me.    Had been following with artem dooley for the last many years. Trying to establish care here, as he sees PCP/others here.    With regards to the diabetes:  Diagnosed with T1DM since around 9 years old.    Known complications:     Retinopathy? No, overdue for f/u.    Nephropathy? No     Neuropathy? No    CAD? No    CVA? No    Current regimen:   Pump. 670G since July, with sensor. Changes site every 3-4 days or so for the most part.    Settings reviewed:    2u/hr    1:5    40        AIT 3 hrs    Prior treatments:   basal/bolus    # times a day testing: using CGM  Log reviewed: No    Pre breakfast: 100-140s  Pre lunch: 80-130s  Pre dinner: 100-180  qHS: 120-180s    Hypoglycemic events? None     Lifestyle modification:   Exercise: daily.    Diet/typical meals:   24 hour recall:    Breakfast: 2 packets oatmeal. 66 carbs    Lunch: protein shake, almond milk, 25 carbs    Dinner: varies.     Current symptoms: weight gain.    diarrhea, skin rashes. Snoring a lot.  Pt denies:   polyuria, polydipsia, vision changes, nausea and vomit    Diabetes Management Status    Statin: Not taking  ACE/ARB: Taking    Screening or Prevention Patient's value Goal Complete/Controlled?   HgA1C Testing and Control   Lab Results   Component Value Date    HGBA1C 10.3 (H) 01/01/2020      q6months/Less than 7% No   Lipid profile : 01/02/2020 Annually Yes   LDL control Lab Results   Component Value Date    LDLCALC 78.8 01/02/2020    Annually/Less than 100 mg/dl  Yes   Nephropathy screening No results found for: MICALBCREAT  Lab Results   Component Value Date    CREATININE 1.0 01/02/2020     Lab Results   Component Value Date    PROTEINUA Negative 11/09/2017    Annually No   Blood pressure BP Readings  from Last 1 Encounters:   09/09/20 125/70    Less than 140/90 Yes   Dilated retinal exam Most Recent Eye Exam Date: Not Found Annually No   Foot exam   : 07/10/2019 Annually No     Reviewed past medical, family, social history and updated as appropriate.    Review of Systems   Constitutional: Positive for unexpected weight change (gained weight).   HENT: Negative for trouble swallowing.    Eyes: Negative for visual disturbance.   Respiratory: Negative for shortness of breath.    Cardiovascular: Negative for palpitations.   Gastrointestinal: Positive for diarrhea.   Endocrine: Negative for polydipsia and polyuria.   Genitourinary: Negative for frequency.   Neurological: Positive for dizziness. Negative for light-headedness.   Psychiatric/Behavioral: Negative for sleep disturbance.     Objective:     Vitals:    09/09/20 1423   BP: 125/70   Pulse: 94   Temp: 98.3 °F (36.8 °C)     BP Readings from Last 5 Encounters:   09/09/20 125/70   07/10/20 121/75   01/13/20 120/80   01/02/20 (!) 142/76   12/30/19 123/82     Physical Exam  Vitals signs reviewed.   Constitutional:       Appearance: He is well-developed. He is obese.   HENT:      Head: Normocephalic and atraumatic.   Neck:      Musculoskeletal: Normal range of motion and neck supple.      Thyroid: No thyromegaly.   Cardiovascular:      Rate and Rhythm: Normal rate and regular rhythm.      Heart sounds: No murmur.   Pulmonary:      Effort: Pulmonary effort is normal.      Breath sounds: Normal breath sounds.   Abdominal:      General: There is no distension.      Palpations: Abdomen is soft. There is no mass.      Tenderness: There is no abdominal tenderness.   Musculoskeletal: Normal range of motion.         General: No tenderness.   Neurological:      Mental Status: He is alert and oriented to person, place, and time.   Psychiatric:         Judgment: Judgment normal.         Wt Readings from Last 10 Encounters:   09/09/20 1423 (!) 141.5 kg (311 lb 15.2 oz)   07/10/20  1247 131.5 kg (290 lb)   01/13/20 1306 134 kg (295 lb 6.7 oz)   01/01/20 1515 134.5 kg (296 lb 8.3 oz)   01/01/20 1150 127 kg (280 lb)   12/30/19 1955 127 kg (280 lb)   11/15/19 1911 127 kg (280 lb)   07/10/19 1824 128.8 kg (284 lb)   11/14/18 0902 (!) 138.4 kg (305 lb 1.9 oz)   10/17/18 1132 (!) 139.7 kg (307 lb 15.7 oz)   10/12/18 1235 (!) 140.5 kg (309 lb 11.9 oz)       Lab Results   Component Value Date    HGBA1C 10.3 (H) 01/01/2020     Lab Results   Component Value Date    CHOL 144 01/02/2020    HDL 54 01/02/2020    LDLCALC 78.8 01/02/2020    TRIG 56 01/02/2020    CHOLHDL 37.5 01/02/2020     Lab Results   Component Value Date     01/02/2020    K 4.0 01/02/2020     01/02/2020    CO2 27 01/02/2020     (H) 01/02/2020    BUN 16 01/02/2020    CREATININE 1.0 01/02/2020    CALCIUM 8.8 01/02/2020    PROT 7.2 11/09/2017    ALBUMIN 3.1 (L) 10/03/2018    BILITOT 0.8 11/09/2017    ALKPHOS 103 11/09/2017    AST 14 11/09/2017    ALT 12 11/09/2017    ANIONGAP 9 01/02/2020    ESTGFRAFRICA >60.0 01/02/2020    EGFRNONAA >60.0 01/02/2020      Assessment/Plan:     Type 1 diabetes mellitus with hyperglycemia  Reviewed goals of therapy - to get the best control we can without hypoglycemia. Goal <7, maybe down to 6.5 if possible   - last A1C well above goal   - since then, pt with new pump and CGM, closed loop system, noted improvements in sugar   - Refer to education, recommend yearly DM education  Medication changes:    continue same regimen at this time  Reviewed patient's current insulin regimen. Clarified proper insulin dose and timing in relation to meals, etc. Insulin injection sites and proper rotation instructed.   -Advised frequent self blood glucose monitoring. Patient encouraged to document glucose results and bring them to every clinic visit  -Close adherence to lifestyle changes recommended.    -Periodic follow ups for eye evaluations, foot care suggested.      Essential hypertension  bp controlled  today   - pt on losartan, wondering if he needs to continue   - don't have any urine protein results in the system here, okay to stop and recheck BP, evaluate proteinuria, and consider restarted based on results   - monitor BP    Class 3 severe obesity due to excess calories with serious comorbidity in adult  BMI 42   - complicated by diabetes   - dm education    - pt working on diet/exercise, encourage him to keep it up   - monitor weight        Follow up in about 3 months (around 12/9/2020) for lab review, further monitoring.      Jesus Healy MD  Endocrinology

## 2020-09-09 NOTE — ASSESSMENT & PLAN NOTE
bp controlled today   - pt on losartan, wondering if he needs to continue   - don't have any urine protein results in the system here, okay to stop and recheck BP, evaluate proteinuria, and consider restarted based on results   - monitor BP

## 2020-09-09 NOTE — Clinical Note
Please try and reach out to [employer] rep and diabetes management & supplies for updating whatever paperwork is needed for him to continue gettinghis supplies. He will need new pump supplies by the end of the week, so fairly time sensitive (if getting them from diabetes management he would be able to go there in person to pick things up which would most likely be faster than mail).  Thanks,   Torie Lee

## 2020-09-11 NOTE — PROGRESS NOTES
Results sent to patient on portal. Please reach out to [employer] representative and/or Diabetes management and Supplies to be sure he's able to get his pump supply refills today/tomorrow since he will run out soon. Let me know if any additional prescriptions/forms needed.

## 2020-09-14 ENCOUNTER — PATIENT MESSAGE (OUTPATIENT)
Dept: ENDOCRINOLOGY | Facility: CLINIC | Age: 40
End: 2020-09-14

## 2020-09-14 DIAGNOSIS — R19.7 DIARRHEA, UNSPECIFIED TYPE: Primary | ICD-10-CM

## 2020-09-14 LAB
GLIADIN PEPTIDE IGA SER-ACNC: 13 UNITS
GLIADIN PEPTIDE IGG SER-ACNC: 3 UNITS
IGA SERPL-MCNC: 306 MG/DL (ref 70–400)
TTG IGA SER-ACNC: 9 UNITS
TTG IGG SER-ACNC: 3 UNITS

## 2020-09-15 ENCOUNTER — OFFICE VISIT (OUTPATIENT)
Dept: SLEEP MEDICINE | Facility: CLINIC | Age: 40
End: 2020-09-15
Payer: COMMERCIAL

## 2020-09-15 VITALS
BODY MASS INDEX: 42.11 KG/M2 | WEIGHT: 310.88 LBS | SYSTOLIC BLOOD PRESSURE: 138 MMHG | HEIGHT: 72 IN | DIASTOLIC BLOOD PRESSURE: 78 MMHG | HEART RATE: 92 BPM

## 2020-09-15 DIAGNOSIS — R29.818 SUSPECTED SLEEP APNEA: ICD-10-CM

## 2020-09-15 DIAGNOSIS — G47.10 HYPERSOMNOLENCE: Primary | ICD-10-CM

## 2020-09-15 PROCEDURE — 99204 OFFICE O/P NEW MOD 45 MIN: CPT | Mod: S$GLB,,, | Performed by: INTERNAL MEDICINE

## 2020-09-15 PROCEDURE — 3078F PR MOST RECENT DIASTOLIC BLOOD PRESSURE < 80 MM HG: ICD-10-PCS | Mod: CPTII,S$GLB,, | Performed by: INTERNAL MEDICINE

## 2020-09-15 PROCEDURE — 3078F DIAST BP <80 MM HG: CPT | Mod: CPTII,S$GLB,, | Performed by: INTERNAL MEDICINE

## 2020-09-15 PROCEDURE — 99999 PR PBB SHADOW E&M-EST. PATIENT-LVL IV: ICD-10-PCS | Mod: PBBFAC,,, | Performed by: INTERNAL MEDICINE

## 2020-09-15 PROCEDURE — 3008F PR BODY MASS INDEX (BMI) DOCUMENTED: ICD-10-PCS | Mod: CPTII,S$GLB,, | Performed by: INTERNAL MEDICINE

## 2020-09-15 PROCEDURE — 3075F SYST BP GE 130 - 139MM HG: CPT | Mod: CPTII,S$GLB,, | Performed by: INTERNAL MEDICINE

## 2020-09-15 PROCEDURE — 99999 PR PBB SHADOW E&M-EST. PATIENT-LVL IV: CPT | Mod: PBBFAC,,, | Performed by: INTERNAL MEDICINE

## 2020-09-15 PROCEDURE — 99204 PR OFFICE/OUTPT VISIT, NEW, LEVL IV, 45-59 MIN: ICD-10-PCS | Mod: S$GLB,,, | Performed by: INTERNAL MEDICINE

## 2020-09-15 PROCEDURE — 3075F PR MOST RECENT SYSTOLIC BLOOD PRESS GE 130-139MM HG: ICD-10-PCS | Mod: CPTII,S$GLB,, | Performed by: INTERNAL MEDICINE

## 2020-09-15 PROCEDURE — 3008F BODY MASS INDEX DOCD: CPT | Mod: CPTII,S$GLB,, | Performed by: INTERNAL MEDICINE

## 2020-09-15 NOTE — PROGRESS NOTES
Clay Waters  is a pleasant 40 y.o. male with history of HTN, DM, BMI 40-45,  here for evaluation for ROSA ELENA..    The patient has had no prior sleep testing.     The patient reports loud snoring for several years.  The patient endorses snoring arousals, gasping arousals, witnessed apneas and morning headache.  Witness to sleep is spouse.     The patient notes sleepiness during the day.  Some sleepy driving, no accidents.  He will doze off when sedentary.  He admits to auditory hypnogogic hallucinations (someone banging on the wall), about 2x per month.  No sleep paralysis.  No cataplexy.    SLEEP SCHEDULE:  Bed Time:  10:30-11p  Weekend changes:      Sleep Latency: <5min  Arousals:  once  Back to sleep (min): 5 min    Wake time:  5:30-6AM (6:30-8AM weekends)    Naps:   0  Nocturia:  0    Work schedule     Driving:  Some sleepiness  ESS:   18/24      ROS:  The patient notes the following symptoms:  difficulty breathing through the nose, morning dry mouth, irregular heartbeat, shortness of breath, acid reflux, body aches and pains, morning headaches, dizziness, mood changes and urge to move legs a lot    The remainder of a complete ROS was performed and is negative except as noted above.     The patient's allergies and medications were reviewed.     The patient's past medical, family, and social history were reviewed.    Vitals:    09/15/20 1302   BP: 138/78   Pulse: 92   Weight: (!) 141 kg (310 lb 13.6 oz)   Height: 6' (1.829 m)     Physical Exam:  GEN:   well-appearing, vitals reviewed  SKIN:  No rash on the face or bridge of the nose  EYES:  No icterus, pupils equal  HEENT: Mallampati 2, mild crowding  CV:  Regular rate and rhythm    Trace lower extremity edema  RESP:  Normal effort    clear to auscultation at the bases  MSK:  Normal gait and station  Psych:  Appropriate affect, demonstrates insight  Neuro:  no resting tremor    RECORDS REVIEWED:    No prior sleep studies available.  No Download    Lab  Results   Component Value Date    TSH 1.244 09/09/2020     No results found for: FERRITIN  Lab Results   Component Value Date    WBC 14.80 (H) 01/02/2020    HGB 13.8 (L) 01/02/2020    HCT 42.6 01/02/2020    MCV 84 01/02/2020     01/02/2020     Pulmonary Functions Testing Results:    No results found for: FEV1, FVC, CZK3NIX, TLC, DLCO  No results found for this or any previous visit.    ASSESSMENT:  POSSIBLE ROSA ELENA : Suggestive S/Sx include: witnessed apneas, sleepiness.    Hypersomnolence : ESS 18/24. No stigmata of narcolepsy apart from occasional HH .  Suspect  secondary to ROSA ELENA.    PLAN:    -will proceed with HST in light of high pretest probability of ROSA ELENA  -will start with autoCPAP if ROSA ELENA present  -RTC 31-90 days after receiving machine    -The patient was warned to never drive when sleepy    The patient was counseled on mask and machine desensitization    The patient was given open opportunity to ask questions and/or express concerns about treatment plan.   All questions/concerns were discussed.     Two patient identifiers used prior to evaluation.

## 2020-09-15 NOTE — LETTER
September 15, 2020      Jesus Healy MD  [address] [facility] [address]           [facility]  [address]  [facility] [address]  Phone: [phone]          Patient: Clay Waters   MR Number: [id number]   YOB: 1980   Date of Visit: 9/15/2020       Dear Dr. Jesus Healy:    Thank you for referring Clay Waters to me for evaluation. Attached you will find relevant portions of my assessment and plan of care.    If you have questions, please do not hesitate to call me. I look forward to following Clay Waters along with you.    Sincerely,    Aime Downey MD    Enclosure  CC:  No Recipients    If you would like to receive this communication electronically, please contact [email or web address] or [phone] to request more information on [employer] Link access.    For providers and/or their staff who would like to refer a patient to [facility], please contact us through our one-stop-shop provider referral line, [facility], at [phone].    If you feel you have received this communication in error or would no longer like to receive these types of communications, please e-mail [email or web address]

## 2020-09-16 ENCOUNTER — CLINICAL SUPPORT (OUTPATIENT)
Dept: DIABETES | Facility: CLINIC | Age: 40
End: 2020-09-16
Payer: COMMERCIAL

## 2020-09-16 DIAGNOSIS — E10.65 TYPE 1 DIABETES MELLITUS WITH HYPERGLYCEMIA: ICD-10-CM

## 2020-09-16 PROCEDURE — 99999 PR PBB SHADOW E&M-EST. PATIENT-LVL II: CPT | Mod: PBBFAC,,,

## 2020-09-16 PROCEDURE — 99999 PR PBB SHADOW E&M-EST. PATIENT-LVL II: ICD-10-PCS | Mod: PBBFAC,,,

## 2020-09-16 PROCEDURE — G0108 PR DIAB MANAGE TRN  PER INDIV: ICD-10-PCS | Mod: S$GLB,,, | Performed by: INTERNAL MEDICINE

## 2020-09-16 PROCEDURE — G0108 DIAB MANAGE TRN  PER INDIV: HCPCS | Mod: S$GLB,,, | Performed by: INTERNAL MEDICINE

## 2020-09-17 VITALS — BODY MASS INDEX: 40.69 KG/M2 | WEIGHT: 300 LBS

## 2020-09-17 NOTE — PROGRESS NOTES
Diabetes Education  Author: Coral Harper RN  Date: 9/17/2020  Diabetes Care Management Summary  Diabetes Education Record Assessment/Progress: Initial  Current Diabetes Risk Level: High   Last A1c:     Last Visit with Diabetes Educator: : 09/16/2020  Last OPCM Referral: : Not Found   Enrolled in OPCM: No  Diabetes Type  Diabetes Type : Type I  Diabetes History  Diabetes Diagnosis: >10 years  Current Treatment: Diet, Insulin pump, Exercise  Health Maintenance was reviewed today with patient. Discussed with patient importance of routine eye exams, foot exams/foot care, blood work (i.e.: A1c, microalbumin, and lipid), dental visits, yearly flu vaccine, and pneumonia vaccine as indicated by PCP. Patient verbalized understanding.     Health Maintenance Topics with due status: Not Due       Topic Last Completion Date    TETANUS VACCINE 04/28/2019    Lipid Panel 01/02/2020    Hemoglobin A1c 09/09/2020     Health Maintenance Due   Topic Date Due    Hepatitis C Screening  1980    Eye Exam  06/14/1992    HIV Screening  06/14/1997    Foot Exam  07/10/2020    Influenza Vaccine (1) 08/01/2020   Nutrition  Meal Planning: water, 3 meals per day, diet drinks, artificial sweeteners  What type of sweetener do you use?: Splenda  What type of beverages do you drink?: diet soda/tea, sport drinks, water  Meal Plan 24 Hour Recall - Breakfast: cheerios  Meal Plan 24 Hour Recall - Lunch: grilled cheese sandwich  Meal Plan 24 Hour Recall - Dinner: tawny steak, mashed potatoes, peas  Monitoring   Self Monitoring : pt has cgm  Blood Glucose Logs: No  Do you use a personal continuous glucose monitor?: Yes  What kind of glucose monitor do you use?: Medtronic Guardian  In the last month, how often have you had a low blood sugar reaction?: more than once a week  What are your symptoms of low blood sugar?: shaky, sweaty,weak  How do you treat low blood sugar?: juice, coke  Can you tell when your blood sugar is too high?:  no  Exercise   Exercise Type: walking, use exercise equipment  Intensity: Low  Frequency: 3-5 Times per week  Duration: 15 min  Current Diabetes Treatment   Current Treatment: Diet, Insulin pump, Exercise  Social History  Preferred Learning Method: Face to Face, Demonstration, Reading Materials  Primary Support: Spouse  Educational Level: Some College  Occupation: manager  Smoking Status: Never a Smoker  Alcohol Use: Never  DDS-2 Score  ( > 3 = SIGNIFICANT DISTRESS): 2     Barriers to Change  Barriers to Change: None  Learning Challenges : None  Readiness to Learn   Readiness to Learn : Acceptance  Cultural Influences  Cultural Influences: None  Diabetes Education Assessment/Progress  Diabetes Disease Process (diabetes disease process and treatment options): Written Materials Provided, Discussion, Individual Session, Demonstrates Understanding/Competency(verbalizes/demonstrates), Instructed  Nutrition (Incorporating nutritional management into one's lifestyle): Demonstration, Written Materials Provided, Discussion, Individual Session, Demonstrates Understanding/Competency (verbalizes/demonstrates), Instructed  Physical Activity (incorporating physical activity into one's lifestyle): Written Materials Provided, Discussion, Individual Session, Demonstrates Understanding/Competency (verbalizes/demonstrates), Instructed  Medications (states correct name, dose, onset, peak, duration, side effects & timing of meds): Written Materials Provided, Discussion, Individual Session, Demonstrates Understanding/Competency(verbalizes/demonstrates), Instructed  Monitoring (monitoring blood glucose/other parameters & using results): Written Materials Provided, Discussion, Individual Session, Demonstrates Understanding/Competency (verbalizes/demonstrates), Instructed  Acute Complications (preventing, detecting, and treating acute complications): Written Materials Provided, Discussion, Individual Session, Demonstrates  Understanding/Competency (verbalizes/demonstrates), Instructed  Chronic Complications (preventing, detecting, and treating chronic complications): Written Materials Provided, Discussion, Individual Session, Demonstrates Understanding/Competency (verbalizes/demonstrates), Instructed  Clinical (diabetes, other pertinent medical history, and relevant comorbidities reviewed during visit): Discussion, Individual Session, Demonstrates Understanding/Competency (verbalizes/demonstrates)  Cognitive (knowledge of self-management skills, functional health literacy): Discussion, Individual Session, Demonstrates Understanding/Competency (verbalizes/demonstrates)  Psychosocial (emotional response to diabetes): Discussion, Individual Session, Demonstrates Understanding/Competency (verbalizes/demonstrates)  Diabetes Distress and Support Systems: Discussion, Individual Session, Demonstrates Understanding/Competency (verbalizes/demonstrates)  Behavioral (readiness for change, lifestyle practices, self-care behaviors): Discussion, Individual Session, Demonstrates Understanding/Competency (verbalizes/demonstrates)  Goals  Patient has selected/evaluated goals during today's session: Yes, selected  Healthy Eating: Set  Start Date: 09/16/20  Target Date: 11/11/20(try to eat balanced)  Healthy Coping: Set  Start Date: 09/16/20  Target Date: 11/11/20  Diabetes Meal Plan  Restrictions: Restricted Carbohydrate  Calories: 1800  Carbohydrate Per Meal: 45-60g  Carbohydrate Per Snack : 15-20g  Instructed pt on the food groups, how to read labels and count carbs. Pt was given sample menus and meal plans as examples. Pt states that he had covid and has gained weight when he was unable to go to the gym. Pt is also being evaluated for celiac disease and has been trying to follow that diet. Pt wears a pump and cgm. Pt likes veggies. Discussed with pt the importance of eating balanced and portioned. Discussed with pt foods that he likes that he could  include in his meal plan. Discussed with pt how to put his meals together. Pt states that he will incorporate the gluten free diet in his diabetic diet because he wants to continue to follow it due to the fact that his daughter has celiac disease and he wants to share with her. 24 hour meal recall was done and discussed with pt what needs to be added to his meals to make them more balanced. Pt will continue to work on meal plan. Discussed covid 19 precautions with pt. Pt was advised to call for any problems or questions. Will fu in one month  Today's Self-Management Care Plan was developed with the patient's input and is based on barriers identified during today's assessment.    The long and short-term goals in the care plan were written with the patient/caregiver's input. The patient has agreed to work toward these goals to improve his overall diabetes control.      The patient received a copy of today's self-management plan and verbalized understanding of the care plan, goals, and all of today's instructions.      The patient was encouraged to communicate with his physician and care team regarding his condition(s) and treatment.  I provided the patient with my contact information today and encouraged him to contact me via phone or patient portal as needed.     Education Units of Time   Time Spent: 60 min

## 2020-09-30 ENCOUNTER — OFFICE VISIT (OUTPATIENT)
Dept: GASTROENTEROLOGY | Facility: CLINIC | Age: 40
End: 2020-09-30
Payer: COMMERCIAL

## 2020-09-30 ENCOUNTER — TELEPHONE (OUTPATIENT)
Dept: ENDOSCOPY | Facility: HOSPITAL | Age: 40
End: 2020-09-30

## 2020-09-30 VITALS
HEIGHT: 72 IN | DIASTOLIC BLOOD PRESSURE: 79 MMHG | SYSTOLIC BLOOD PRESSURE: 118 MMHG | WEIGHT: 310.19 LBS | HEART RATE: 87 BPM | BODY MASS INDEX: 42.01 KG/M2

## 2020-09-30 DIAGNOSIS — R19.4 CHANGE IN BOWEL HABIT: ICD-10-CM

## 2020-09-30 DIAGNOSIS — Z12.11 SPECIAL SCREENING FOR MALIGNANT NEOPLASMS, COLON: Primary | ICD-10-CM

## 2020-09-30 DIAGNOSIS — Z01.818 PRE-OP TESTING: ICD-10-CM

## 2020-09-30 DIAGNOSIS — R10.9 ABDOMINAL PAIN, UNSPECIFIED ABDOMINAL LOCATION: Primary | ICD-10-CM

## 2020-09-30 DIAGNOSIS — K62.5 RECTAL BLEEDING: ICD-10-CM

## 2020-09-30 PROCEDURE — 99999 PR PBB SHADOW E&M-EST. PATIENT-LVL IV: ICD-10-PCS | Mod: PBBFAC,,, | Performed by: INTERNAL MEDICINE

## 2020-09-30 PROCEDURE — 99244 PR OFFICE CONSULTATION,LEVEL IV: ICD-10-PCS | Mod: S$GLB,,, | Performed by: INTERNAL MEDICINE

## 2020-09-30 PROCEDURE — 99999 PR PBB SHADOW E&M-EST. PATIENT-LVL IV: CPT | Mod: PBBFAC,,, | Performed by: INTERNAL MEDICINE

## 2020-09-30 PROCEDURE — 99244 OFF/OP CNSLTJ NEW/EST MOD 40: CPT | Mod: S$GLB,,, | Performed by: INTERNAL MEDICINE

## 2020-09-30 RX ORDER — POLYETHYLENE GLYCOL 3350, SODIUM SULFATE ANHYDROUS, SODIUM BICARBONATE, SODIUM CHLORIDE, POTASSIUM CHLORIDE 236; 22.74; 6.74; 5.86; 2.97 G/4L; G/4L; G/4L; G/4L; G/4L
4 POWDER, FOR SOLUTION ORAL ONCE
Qty: 4000 ML | Refills: 0 | Status: SHIPPED | OUTPATIENT
Start: 2020-09-30 | End: 2020-09-30

## 2020-09-30 NOTE — PROGRESS NOTES
[facility]    Referral from Jesus Healy MD    CC: abdominal pain    HPI 40 y.o. male with past medical history of DM Type I and a strong family history of Celiac disease in his father and daughter who presents for further evaluation of daily, moderate, generalized abdominal pain and increased flatulence while on a gluten filled diet without weight loss or diarrhea.  He reports that several years ago in addition to his abdominal symptoms he would develop a rash on his forearms and nodules on his fingers while eating a gluten free diet.    He is not on a strict gluten free diet currently but he avoids it if possible.    He reports a change in his bowel patterns which occurred about 3 years ago where he went from having a daily BM to now having a BM every 3-4 days.  Sometimes these BM's are hard and he has had rectal bleeding.    The patient is new to me.    Chart reviewed and summarized here.    Past Medical History  Past Medical History:   Diagnosis Date    Childhood asthma     Class 3 severe obesity due to excess calories with serious comorbidity in adult 1/1/2020    Essential hypertension 10/1/2018    Influenza B 1/1/2020    Insulin pump in place 10/1/2018    Lung nodule 10/2/2018    <1cm multiple lung nodules in the setting of staph infection LTNS No history of cancer    Mild intermittent asthma with exacerbation 1/1/2020    Type 1 diabetes mellitus with hyperglycemia 10/1/2018       Past Surgical History  Past Surgical History:   Procedure Laterality Date    VASECTOMY         Social History  Social History     Tobacco Use    Smoking status: Never Smoker    Smokeless tobacco: Never Used   Substance Use Topics    Alcohol use: Yes     Comment: occas, none recently    Drug use: No       Family History  Family History   Problem Relation Age of Onset    Celiac disease Father     Celiac disease Daughter    No family history of colon cancer or IBD    Review of Systems  General ROS:  negative for chills, fever or weight loss.  Positive for weight gain  Psychological ROS: negative for hallucination, depression or suicidal ideation  Ophthalmic ROS: negative for blurry vision, photophobia or eye pain  ENT ROS: negative for epistaxis, sore throat or rhinorrhea.  Positive for occasional oral lesions  Respiratory ROS: no cough, shortness of breath, or wheezing  Cardiovascular ROS: no chest pain or dyspnea on exertion  Gastrointestinal ROS: positive for abdominal pain, increased flatulence and rectal bleeding  Genito-Urinary ROS: no dysuria, trouble voiding, or hematuria  Musculoskeletal ROS: negative for gait disturbance or muscular weakness  Neurological ROS: no syncope or seizures; no ataxia  Dermatological ROS: negative for pruritis, and jaundice.  Positive for intermittent rash    Physical Examination  /79   Pulse 87   Ht 6' (1.829 m)   Wt (!) 140.7 kg (310 lb 3 oz)   BMI 42.07 kg/m²   General appearance: alert, cooperative, no distress  HENT: Normocephalic, atraumatic, neck symmetrical, no nasal discharge   Eyes: conjunctivae/corneas clear, PERRL, EOM's intact  Lungs: clear to auscultation bilaterally, no dullness to percussion bilaterally  Heart: regular rate and rhythm without rub; no displacement of the PMI   Abdomen: soft, non-tender; bowel sounds normoactive; no organomegaly  Extremities: extremities symmetric; no clubbing, cyanosis, or edema  Integument: Skin color, texture, turgor normal; no rashes; hair distrubution normal  Neurologic: Alert and oriented X 3, normal strength, normal coordination and gait  Psychiatric: no pressured speech; normal affect; no evidence of impaired cognition     Labs:  Lab Results   Component Value Date    WBC 14.80 (H) 01/02/2020    HGB 13.8 (L) 01/02/2020    HCT 42.6 01/02/2020    MCV 84 01/02/2020     01/02/2020         CMP  Sodium   Date Value Ref Range Status   09/09/2020 142 136 - 145 mmol/L Final     Potassium   Date Value Ref Range  Status   09/09/2020 4.0 3.5 - 5.1 mmol/L Final     Chloride   Date Value Ref Range Status   09/09/2020 108 95 - 110 mmol/L Final     CO2   Date Value Ref Range Status   09/09/2020 23 23 - 29 mmol/L Final     Glucose   Date Value Ref Range Status   09/09/2020 79 70 - 110 mg/dL Final     BUN, Bld   Date Value Ref Range Status   09/09/2020 18 6 - 20 mg/dL Final     Creatinine   Date Value Ref Range Status   09/09/2020 1.2 0.5 - 1.4 mg/dL Final     Calcium   Date Value Ref Range Status   09/09/2020 9.1 8.7 - 10.5 mg/dL Final     Total Protein   Date Value Ref Range Status   11/09/2017 7.2 6.0 - 8.4 g/dL Final     Albumin   Date Value Ref Range Status   10/03/2018 3.1 (L) 3.5 - 5.2 g/dL Final     Total Bilirubin   Date Value Ref Range Status   11/09/2017 0.8 0.1 - 1.0 mg/dL Final     Comment:     For infants and newborns, interpretation of results should be based  on gestational age, weight and in agreement with clinical  observations.  Premature Infant recommended reference ranges:  Up to 24 hours.............<8.0 mg/dL  Up to 48 hours............<12.0 mg/dL  3-5 days..................<15.0 mg/dL  6-29 days.................<15.0 mg/dL       Alkaline Phosphatase   Date Value Ref Range Status   11/09/2017 103 55 - 135 U/L Final     AST   Date Value Ref Range Status   11/09/2017 14 10 - 40 U/L Final     ALT   Date Value Ref Range Status   11/09/2017 12 10 - 44 U/L Final     Anion Gap   Date Value Ref Range Status   09/09/2020 11 8 - 16 mmol/L Final     eGFR if    Date Value Ref Range Status   09/09/2020 >60 >60 mL/min/1.73 m^2 Final     eGFR if non    Date Value Ref Range Status   09/09/2020 >60 >60 mL/min/1.73 m^2 Final     Comment:     Calculation used to obtain the estimated glomerular filtration  rate (eGFR) is the CKD-EPI equation.          Imaging: CXR 1/1/20 Clear lung fields bilaterally, no pneumothorax    I have personally reviewed and interpreted these images.    Assessment:   1.  Abdominal pain, unspecified abdominal location    2. Change in bowel habit    3. Rectal bleeding    4.      Family history of celiac disease in his father and daughter.  The patient had negative serologic testing but he was excluding gluten from his diet at that time.  5.      Morbid obesity     Plan:   Schedule EGD and colonoscopy for further evaluation for celiac disease due to his strong family history as well as because he has Type I DM with abdominal symptoms.  Colonoscopy due to his change in bowel patterns and rectal bleeding.    I will plan to perform biopsies to evaluate for Celiac disease.    I have advised him that he should remain on a diet with gluten prior to endoscopy.    I have recommended Miralax as needed for the patient if his constipation becomes bothersome to him, or stool softeners as needed.    Return to clinic PRN.    Josie Dias MD

## 2020-09-30 NOTE — LETTER
September 30, 2020      Jesus Healy MD  [address] [facility] [place] [address]           [facility] [address]  [address]  [facility] [address]  Phone: [phone]  Fax: [phone]          Patient: Clay Waters   MR Number: [id number]   YOB: 1980   Date of Visit: 9/30/2020       Dear Dr. Jesus Healy:    Thank you for referring Clay Waters to me for evaluation. Attached you will find relevant portions of my assessment and plan of care.    If you have questions, please do not hesitate to call me. I look forward to following Clay Waters along with you.    Sincerely,    Josie Dias MD    Enclosure  CC:  No Recipients    If you would like to receive this communication electronically, please contact [email or web address] or [phone] to request more information on [employer] access.    For providers and/or their staff who would like to refer a patient to [facility], please contact us through our one-stop-shop provider referral line, [facility], at [phone].    If you feel you have received this communication in error or would no longer like to receive these types of communications, please e-mail [email or web address]

## 2020-10-05 ENCOUNTER — TELEPHONE (OUTPATIENT)
Dept: SLEEP MEDICINE | Facility: OTHER | Age: 40
End: 2020-10-05

## 2020-11-01 ENCOUNTER — CLINICAL SUPPORT (OUTPATIENT)
Dept: URGENT CARE | Facility: CLINIC | Age: 40
End: 2020-11-01
Payer: COMMERCIAL

## 2020-11-01 VITALS — TEMPERATURE: 99 F

## 2020-11-01 DIAGNOSIS — Z01.818 PRE-OP TESTING: ICD-10-CM

## 2020-11-01 PROCEDURE — U0003 INFECTIOUS AGENT DETECTION BY NUCLEIC ACID (DNA OR RNA); SEVERE ACUTE RESPIRATORY SYNDROME CORONAVIRUS 2 (SARS-COV-2) (CORONAVIRUS DISEASE [COVID-19]), AMPLIFIED PROBE TECHNIQUE, MAKING USE OF HIGH THROUGHPUT TECHNOLOGIES AS DESCRIBED BY CMS-2020-01-R: HCPCS

## 2020-11-02 ENCOUNTER — HOSPITAL ENCOUNTER (OUTPATIENT)
Dept: SLEEP MEDICINE | Facility: OTHER | Age: 40
Discharge: HOME | End: 2020-11-02
Attending: INTERNAL MEDICINE
Payer: COMMERCIAL

## 2020-11-02 DIAGNOSIS — G47.10 HYPERSOMNOLENCE: ICD-10-CM

## 2020-11-02 LAB — SARS-COV-2 RNA RESP QL NAA+PROBE: NOT DETECTED

## 2020-11-02 PROCEDURE — 95800 SLP STDY UNATTENDED: CPT

## 2020-11-04 ENCOUNTER — HOSPITAL ENCOUNTER (OUTPATIENT)
Facility: HOSPITAL | Age: 40
Discharge: HOME | End: 2020-11-04
Attending: INTERNAL MEDICINE | Admitting: INTERNAL MEDICINE
Payer: COMMERCIAL

## 2020-11-04 ENCOUNTER — ANESTHESIA EVENT (OUTPATIENT)
Dept: ENDOSCOPY | Facility: HOSPITAL | Age: 40
End: 2020-11-04
Payer: COMMERCIAL

## 2020-11-04 ENCOUNTER — ANESTHESIA (OUTPATIENT)
Dept: ENDOSCOPY | Facility: HOSPITAL | Age: 40
End: 2020-11-04
Payer: COMMERCIAL

## 2020-11-04 VITALS
SYSTOLIC BLOOD PRESSURE: 111 MMHG | HEIGHT: 72 IN | BODY MASS INDEX: 40.63 KG/M2 | HEART RATE: 63 BPM | WEIGHT: 300 LBS | OXYGEN SATURATION: 98 % | RESPIRATION RATE: 18 BRPM | DIASTOLIC BLOOD PRESSURE: 65 MMHG | TEMPERATURE: 98 F

## 2020-11-04 DIAGNOSIS — R10.9 ABDOMINAL PAIN: ICD-10-CM

## 2020-11-04 LAB
POCT GLUCOSE: 106 MG/DL (ref 70–110)
POCT GLUCOSE: 125 MG/DL (ref 70–110)

## 2020-11-04 PROCEDURE — D9220A PRA ANESTHESIA: ICD-10-PCS | Mod: CRNA,,, | Performed by: STUDENT IN AN ORGANIZED HEALTH CARE EDUCATION/TRAINING PROGRAM

## 2020-11-04 PROCEDURE — 45380 COLONOSCOPY AND BIOPSY: CPT | Mod: ,,, | Performed by: INTERNAL MEDICINE

## 2020-11-04 PROCEDURE — D9220A PRA ANESTHESIA: Mod: CRNA,,, | Performed by: STUDENT IN AN ORGANIZED HEALTH CARE EDUCATION/TRAINING PROGRAM

## 2020-11-04 PROCEDURE — 43239 EGD BIOPSY SINGLE/MULTIPLE: CPT | Mod: 51,,, | Performed by: INTERNAL MEDICINE

## 2020-11-04 PROCEDURE — 88305 TISSUE EXAM BY PATHOLOGIST: ICD-10-PCS | Mod: 26,,, | Performed by: PATHOLOGY

## 2020-11-04 PROCEDURE — 43239 EGD BIOPSY SINGLE/MULTIPLE: CPT | Performed by: INTERNAL MEDICINE

## 2020-11-04 PROCEDURE — 45380 PR COLONOSCOPY,BIOPSY: ICD-10-PCS | Mod: ,,, | Performed by: INTERNAL MEDICINE

## 2020-11-04 PROCEDURE — 88305 TISSUE EXAM BY PATHOLOGIST: CPT | Mod: 59 | Performed by: PATHOLOGY

## 2020-11-04 PROCEDURE — 27201012 HC FORCEPS, HOT/COLD, DISP: Performed by: INTERNAL MEDICINE

## 2020-11-04 PROCEDURE — 37000009 HC ANESTHESIA EA ADD 15 MINS: Performed by: INTERNAL MEDICINE

## 2020-11-04 PROCEDURE — D9220A PRA ANESTHESIA: Mod: ANES,,, | Performed by: ANESTHESIOLOGY

## 2020-11-04 PROCEDURE — 43239 PR EGD, FLEX, W/BIOPSY, SGL/MULTI: ICD-10-PCS | Mod: 51,,, | Performed by: INTERNAL MEDICINE

## 2020-11-04 PROCEDURE — 45380 COLONOSCOPY AND BIOPSY: CPT | Performed by: INTERNAL MEDICINE

## 2020-11-04 PROCEDURE — 37000008 HC ANESTHESIA 1ST 15 MINUTES: Performed by: INTERNAL MEDICINE

## 2020-11-04 PROCEDURE — D9220A PRA ANESTHESIA: ICD-10-PCS | Mod: ANES,,, | Performed by: ANESTHESIOLOGY

## 2020-11-04 PROCEDURE — 88305 TISSUE EXAM BY PATHOLOGIST: CPT | Mod: 26,,, | Performed by: PATHOLOGY

## 2020-11-04 PROCEDURE — 25000003 PHARM REV CODE 250: Performed by: INTERNAL MEDICINE

## 2020-11-04 PROCEDURE — 82962 GLUCOSE BLOOD TEST: CPT | Performed by: INTERNAL MEDICINE

## 2020-11-04 PROCEDURE — 25000003 PHARM REV CODE 250: Performed by: STUDENT IN AN ORGANIZED HEALTH CARE EDUCATION/TRAINING PROGRAM

## 2020-11-04 PROCEDURE — 63600175 PHARM REV CODE 636 W HCPCS: Performed by: STUDENT IN AN ORGANIZED HEALTH CARE EDUCATION/TRAINING PROGRAM

## 2020-11-04 RX ORDER — FENTANYL CITRATE 50 UG/ML
INJECTION, SOLUTION INTRAMUSCULAR; INTRAVENOUS
Status: DISCONTINUED | OUTPATIENT
Start: 2020-11-04 | End: 2020-11-04

## 2020-11-04 RX ORDER — SODIUM CHLORIDE 0.9 % (FLUSH) 0.9 %
3 SYRINGE (ML) INJECTION
Status: DISCONTINUED | OUTPATIENT
Start: 2020-11-04 | End: 2020-11-04 | Stop reason: HOSPADM

## 2020-11-04 RX ORDER — SODIUM CHLORIDE 9 MG/ML
INJECTION, SOLUTION INTRAVENOUS CONTINUOUS
Status: DISCONTINUED | OUTPATIENT
Start: 2020-11-04 | End: 2020-11-04 | Stop reason: HOSPADM

## 2020-11-04 RX ORDER — ONDANSETRON 2 MG/ML
INJECTION INTRAMUSCULAR; INTRAVENOUS
Status: DISCONTINUED | OUTPATIENT
Start: 2020-11-04 | End: 2020-11-04

## 2020-11-04 RX ORDER — LIDOCAINE HCL/PF 100 MG/5ML
SYRINGE (ML) INTRAVENOUS
Status: DISCONTINUED | OUTPATIENT
Start: 2020-11-04 | End: 2020-11-04

## 2020-11-04 RX ORDER — MIDAZOLAM HYDROCHLORIDE 1 MG/ML
INJECTION, SOLUTION INTRAMUSCULAR; INTRAVENOUS
Status: DISCONTINUED | OUTPATIENT
Start: 2020-11-04 | End: 2020-11-04

## 2020-11-04 RX ORDER — PROPOFOL 10 MG/ML
VIAL (ML) INTRAVENOUS CONTINUOUS PRN
Status: DISCONTINUED | OUTPATIENT
Start: 2020-11-04 | End: 2020-11-04

## 2020-11-04 RX ORDER — PROPOFOL 10 MG/ML
VIAL (ML) INTRAVENOUS
Status: DISCONTINUED | OUTPATIENT
Start: 2020-11-04 | End: 2020-11-04

## 2020-11-04 RX ADMIN — Medication 100 MG: at 08:11

## 2020-11-04 RX ADMIN — PROPOFOL 80 MG: 10 INJECTION, EMULSION INTRAVENOUS at 08:11

## 2020-11-04 RX ADMIN — SODIUM CHLORIDE: 0.9 INJECTION, SOLUTION INTRAVENOUS at 08:11

## 2020-11-04 RX ADMIN — MIDAZOLAM HYDROCHLORIDE 2 MG: 1 INJECTION, SOLUTION INTRAMUSCULAR; INTRAVENOUS at 08:11

## 2020-11-04 RX ADMIN — FENTANYL CITRATE 50 MCG: 50 INJECTION, SOLUTION INTRAMUSCULAR; INTRAVENOUS at 08:11

## 2020-11-04 RX ADMIN — TOPICAL ANESTHETIC 0.5 ML: 200 SPRAY DENTAL; PERIODONTAL at 08:11

## 2020-11-04 RX ADMIN — PROPOFOL 175 MCG/KG/MIN: 10 INJECTION, EMULSION INTRAVENOUS at 08:11

## 2020-11-04 RX ADMIN — ONDANSETRON 4 MG: 2 INJECTION, SOLUTION INTRAMUSCULAR; INTRAVENOUS at 09:11

## 2020-11-04 NOTE — DISCHARGE INSTRUCTIONS

## 2020-11-04 NOTE — TRANSFER OF CARE
Anesthesia Transfer of Care Note    Patient: Clay Waters    Procedure(s) Performed: Procedure(s) (LRB):  EGD (ESOPHAGOGASTRODUODENOSCOPY) (N/A)  COLONOSCOPY (N/A)    Patient location: [facility]    Anesthesia Type: general    Transport from OR: Transported from OR on 6-10 L/min O2 by face mask with adequate spontaneous ventilation    Post pain: adequate analgesia    Post assessment: no apparent anesthetic complications    Post vital signs: stable    Level of consciousness: sedated    Nausea/Vomiting: no nausea/vomiting    Complications: none    Transfer of care protocol was followed      Last vitals:   Visit Vitals  BP (!) 99/53 (BP Location: Left arm, Patient Position: Lying)   Pulse 81   Temp 36.6 °C (97.9 °F) (Temporal)   Resp 16   Ht 6' (1.829 m)   Wt 136.1 kg (300 lb)   SpO2 96%   BMI 40.69 kg/m²

## 2020-11-04 NOTE — H&P
Short Stay Endoscopy History and Physical    PCP - Spencer Hernandez MD     Procedure - EGD and Colonoscopy  ASA - per anesthesia  Mallampati - per anesthesia  History of Anesthesia problems - no  Family history Anesthesia problems -  no   Plan of anesthesia - General    HPI:  This is a 40 y.o. male here for evaluation of :     abdominal pain, diarrhea, post-prandial bloating and rectal bleeding    Referred by Dr. Dias. Family history of celiac disease. TTG IgA normal, however on a gluten free diet. Patient back on gluten diet now.      ROS:  Constitutional: No fevers, chills, No weight loss  CV: No chest pain  Pulm: No cough, No shortness of breath  Ophtho: No vision changes  GI: see HPI  Derm: No rash    Medical History:  has a past medical history of Childhood asthma, Class 3 severe obesity due to excess calories with serious comorbidity in adult (1/1/2020), Essential hypertension (10/1/2018), Influenza B (1/1/2020), Insulin pump in place (10/1/2018), Lung nodule (10/2/2018), Mild intermittent asthma with exacerbation (1/1/2020), and Type 1 diabetes mellitus with hyperglycemia (10/1/2018).    Surgical History:  has a past surgical history that includes Vasectomy.    Family History: family history includes Celiac disease in his daughter and father.. Otherwise no colon cancer, inflammatory bowel disease, or GI malignancies.    Social History:  reports that he has never smoked. He has never used smokeless tobacco. He reports current alcohol use. He reports that he does not use drugs.    Review of patient's allergies indicates:   Allergen Reactions    Vicodin [hydrocodone-acetaminophen] Nausea And Vomiting       Medications:   Medications Prior to Admission   Medication Sig Dispense Refill Last Dose    cetirizine (ZYRTEC) 10 MG tablet Take 10 mg by mouth once daily.   11/3/2020 at Unknown time    INSULIN PUMP CARTRIDGE SUBQ Inject into the skin.   11/4/2020 at Unknown time    losartan (COZAAR) 50 MG tablet  Take 50 mg by mouth once daily.   11/4/2020 at Unknown time    NOVOLOG U-100 INSULIN ASPART 100 unit/mL injection Use with insulin pump. Max daily dose 140 units/day 12 vial 3 11/3/2020 at Unknown time    albuterol (ACCUNEB) 1.25 mg/3 mL Nebu Take 3 mLs (1.25 mg total) by nebulization 3 (three) times daily. 3 times per day while on tamiflu/steroids, then as needed 90 mL 2 More than a month at Unknown time    albuterol (PROAIR HFA) 90 mcg/actuation inhaler Inhale 2 puffs into the lungs every 6 (six) hours as needed for Wheezing. Rescue 18 g 0 More than a month at Unknown time    albuterol (VENTOLIN HFA) 90 mcg/actuation inhaler Inhale 2 puffs into the lungs every 6 (six) hours as needed for Wheezing. Rescue 18 g 1 More than a month at Unknown time    ascorbic acid, vitamin C, (VITAMIN C) 1000 MG tablet Take 1 tablet (1,000 mg total) by mouth once daily.   More than a month at Unknown time    benzonatate (TESSALON PERLES) 100 MG capsule Take 1 capsule (100 mg total) by mouth every 6 (six) hours as needed for Cough. 30 capsule 1 More than a month at Unknown time    budesonide (PULMICORT) 0.5 mg/2 mL nebulizer solution Take 2 mLs (0.5 mg total) by nebulization 2 (two) times a day. Controller 120 mL 11 More than a month at Unknown time       Physical Exam:    Vital Signs:   Vitals:    11/04/20 0828   BP: 127/76   Pulse: 81   Resp: 16   Temp: 98.6 °F (37 °C)       General Appearance: Well appearing in no acute distress  Eyes:    No scleral icterus  ENT: Neck supple, Lips, mucosa, and tongue normal; teeth and gums normal  Lungs: CTA anteriorly  Heart:  Regular rate, S1, S2 normal, no murmurs heard.  Abdomen: Soft, non tender, non distended with normal bowel sounds. No hepatosplenomegaly, ascites, or mass.  Extremities: No edema  Skin: No rash    Labs:  Lab Results   Component Value Date    WBC 14.80 (H) 01/02/2020    HGB 13.8 (L) 01/02/2020    HCT 42.6 01/02/2020     01/02/2020    CHOL 144 01/02/2020    TRIG  56 01/02/2020    HDL 54 01/02/2020    ALT 12 11/09/2017    AST 14 11/09/2017     09/09/2020    K 4.0 09/09/2020     09/09/2020    CREATININE 1.2 09/09/2020    BUN 18 09/09/2020    CO2 23 09/09/2020    TSH 1.244 09/09/2020    INR 1.0 09/30/2018    HGBA1C 8.0 (H) 09/09/2020       I have explained the risks and benefits of endoscopy procedures to the patient including but not limited to bleeding, perforation, infection, and death.  The patient was asked if they understand and allowed to ask any further questions to their satisfaction.      Christa Hubbard MD

## 2020-11-04 NOTE — [employer] PATIENT INSTRUCTIONS
Discharge Summary/Instructions after an Endoscopic Procedure  Patient Name: Clay Waters  Patient MRN: [id number]  Patient YOB: 1980 Wednesday, November 4, 2020  Jesus Claros MD  RESTRICTIONS:  During your procedure today, you received medications for sedation.  These   medications may affect your judgment, balance and coordination.  Therefore,   for 24 hours, you have the following restrictions:   - DO NOT drive a car, operate machinery, make legal/financial decisions,   sign important papers or drink alcohol.    ACTIVITY:  Today: no heavy lifting, straining or running due to procedural   sedation/anesthesia.  The following day: return to full activity including work.  DIET:  Eat and drink normally unless instructed otherwise.     TREATMENT FOR COMMON SIDE EFFECTS:  - Mild abdominal pain, nausea, belching, bloating or excessive gas:  rest,   eat lightly and use a heating pad.  - Sore Throat: treat with throat lozenges and/or gargle with warm salt   water.  - Because air was used during the procedure, expelling large amounts of air   from your rectum or belching is normal.  - If a bowel prep was taken, you may not have a bowel movement for 1-3 days.    This is normal.  SYMPTOMS TO WATCH FOR AND REPORT TO YOUR PHYSICIAN:  1. Abdominal pain or bloating, other than gas cramps.  2. Chest pain.  3. Back pain.  4. Signs of infection such as: chills or fever occurring within 24 hours   after the procedure.  5. Rectal bleeding, which would show as bright red, maroon, or black stools.   (A tablespoon of blood from the rectum is not serious, especially if   hemorrhoids are present.)  6. Vomiting.  7. Weakness or dizziness.  GO DIRECTLY TO THE NEAREST EMERGENCY ROOM IF YOU HAVE ANY OF THE FOLLOWING:      Difficulty breathing              Chills and/or fever over 101 F   Persistent vomiting and/or vomiting blood   Severe abdominal pain   Severe chest pain   Black, tarry stools   Bleeding- more than one  tablespoon   Any other symptom or condition that you feel may need urgent attention  Your doctor recommends these additional instructions:  If any biopsies were taken, your doctors clinic will contact you in 1 to 2   weeks with any results.  - Discharge patient to home.   - Await pathology results.   - Perform a colonoscopy today.  For questions, problems or results please call your physician - Jesus Claros MD at Work:  [phone].  [facility] [place], EMERGENCY ROOM PHONE NUMBER: [phone]  IF A COMPLICATION OR EMERGENCY SITUATION ARISES AND YOU ARE UNABLE TO REACH   YOUR PHYSICIAN - GO DIRECTLY TO THE EMERGENCY ROOM.  Jesus Claros MD  11/4/2020 9:28:35 AM  This report has been verified and signed electronically.  [employer]

## 2020-11-04 NOTE — ANESTHESIA POSTPROCEDURE EVALUATION
Anesthesia Post Evaluation    Patient: Clay Waters    Procedure(s) Performed: Procedure(s) (LRB):  EGD (ESOPHAGOGASTRODUODENOSCOPY) (N/A)  COLONOSCOPY (N/A)    Final Anesthesia Type: general    Patient location during evaluation: PACU  Patient participation: Yes- Able to Participate  Level of consciousness: awake and alert and oriented  Post-procedure vital signs: reviewed and stable  Pain management: adequate  Airway patency: patent    PONV status at discharge: No PONV  Anesthetic complications: no      Cardiovascular status: blood pressure returned to baseline  Respiratory status: unassisted, room air and spontaneous ventilation  Hydration status: euvolemic  Follow-up not needed.          Vitals Value Taken Time   /65 11/04/20 1002   Temp 36.6 °C (97.9 °F) 11/04/20 0934   Pulse 64 11/04/20 1007   Resp 14 11/04/20 1007   SpO2 98 % 11/04/20 1007   Vitals shown include unvalidated device data.      No case tracking events are documented in the log.      Pain/Karla Score: Karla Score: 9 (11/4/2020  9:57 AM)

## 2020-11-04 NOTE — [employer] PATIENT INSTRUCTIONS
Discharge Summary/Instructions after an Endoscopic Procedure  Patient Name: Clay Waters  Patient MRN: [id number]  Patient YOB: 1980 Wednesday, November 4, 2020  Jesus Claros MD  RESTRICTIONS:  During your procedure today, you received medications for sedation.  These   medications may affect your judgment, balance and coordination.  Therefore,   for 24 hours, you have the following restrictions:   - DO NOT drive a car, operate machinery, make legal/financial decisions,   sign important papers or drink alcohol.    ACTIVITY:  Today: no heavy lifting, straining or running due to procedural   sedation/anesthesia.  The following day: return to full activity including work.  DIET:  Eat and drink normally unless instructed otherwise.     TREATMENT FOR COMMON SIDE EFFECTS:  - Mild abdominal pain, nausea, belching, bloating or excessive gas:  rest,   eat lightly and use a heating pad.  - Sore Throat: treat with throat lozenges and/or gargle with warm salt   water.  - Because air was used during the procedure, expelling large amounts of air   from your rectum or belching is normal.  - If a bowel prep was taken, you may not have a bowel movement for 1-3 days.    This is normal.  SYMPTOMS TO WATCH FOR AND REPORT TO YOUR PHYSICIAN:  1. Abdominal pain or bloating, other than gas cramps.  2. Chest pain.  3. Back pain.  4. Signs of infection such as: chills or fever occurring within 24 hours   after the procedure.  5. Rectal bleeding, which would show as bright red, maroon, or black stools.   (A tablespoon of blood from the rectum is not serious, especially if   hemorrhoids are present.)  6. Vomiting.  7. Weakness or dizziness.  GO DIRECTLY TO THE NEAREST EMERGENCY ROOM IF YOU HAVE ANY OF THE FOLLOWING:      Difficulty breathing              Chills and/or fever over 101 F   Persistent vomiting and/or vomiting blood   Severe abdominal pain   Severe chest pain   Black, tarry stools   Bleeding- more than one  tablespoon   Any other symptom or condition that you feel may need urgent attention  Your doctor recommends these additional instructions:  If any biopsies were taken, your doctors clinic will contact you in 1 to 2   weeks with any results.  - Discharge patient to home (ambulatory).   - Patient has a contact number available for emergencies.  The signs and   symptoms of potential delayed complications were discussed with the   patient.  Return to normal activities tomorrow.  Written discharge   instructions were provided to the patient.   - Resume previous diet.   - Continue present medications.   - Await pathology results.   - Repeat colonoscopy in 10 years for screening purposes.  For questions, problems or results please call your physician - Jesus Claros MD at Work:  [phone].  [facility], EMERGENCY ROOM PHONE NUMBER: [phone]  IF A COMPLICATION OR EMERGENCY SITUATION ARISES AND YOU ARE UNABLE TO REACH   YOUR PHYSICIAN - GO DIRECTLY TO THE EMERGENCY ROOM.  Jesus Claros MD  11/4/2020 9:29:06 AM  This report has been verified and signed electronically.  [employer]

## 2020-11-04 NOTE — ANESTHESIA PREPROCEDURE EVALUATION
11/04/2020  Clay Waters is a 40 y.o., male.    Anesthesia Evaluation    I have reviewed the Patient Summary Reports.    I have reviewed the Nursing Notes. I have reviewed the NPO Status.   I have reviewed the Medications.     Review of Systems  Anesthesia Hx:  No problems with previous Anesthesia  History of prior surgery of interest to airway management or planning: Denies Family Hx of Anesthesia complications.   Denies Personal Hx of Anesthesia complications.   Hematology/Oncology:  Hematology Normal   Oncology Normal     EENT/Dental:EENT/Dental Normal   Cardiovascular:   Exercise tolerance: good Hypertension ECG has been reviewed.    Pulmonary:   Asthma mild    Renal/:  Renal/ Normal     Hepatic/GI:  Hepatic/GI Normal    Musculoskeletal:  Musculoskeletal Normal    Neurological:  Neurology Normal    Endocrine:   Diabetes, poorly controlled, type 1, using insulin    Dermatological:  Skin Normal    Psych:  Psychiatric Normal           Physical Exam  General:  Well nourished, Morbid Obesity    Airway/Jaw/Neck:  Airway Findings: Mouth Opening: Normal Tongue: Normal  General Airway Assessment: Adult  Mallampati: II  TM Distance: Normal, at least 6 cm        Eyes/Ears/Nose:  EYES/EARS/NOSE FINDINGS: Normal   Dental:  DENTAL FINDINGS: Normal   Chest/Lungs:  Chest/Lungs Clear    Heart/Vascular:  Heart Findings: Normal Heart murmur: negative Vascular Findings: Normal    Abdomen:  Abdomen Findings: Normal    Musculoskeletal:  Musculoskeletal Findings: Normal   Skin:  Skin Findings: Normal    Mental Status:  Mental Status Findings: Normal        Anesthesia Plan  Type of Anesthesia, risks & benefits discussed:  Anesthesia Type:  general  Patient's Preference:   Intra-op Monitoring Plan: standard ASA monitors  Intra-op Monitoring Plan Comments:   Post Op Pain Control Plan:   Post Op Pain Control Plan  Comments:   Induction:   IV  Beta Blocker:  Patient is not currently on a Beta-Blocker (No further documentation required).       Informed Consent: Patient understands risks and agrees with Anesthesia plan.  Questions answered. Anesthesia consent signed with patient.  ASA Score: 3     Day of Surgery Review of History & Physical:    H&P update referred to the provider.     Anesthesia Plan Notes: Pt will remove insulin pump prior to procedure and replace once recovered.        Ready For Surgery From Anesthesia Perspective.

## 2020-11-05 NOTE — PROCEDURES
HST completed and interpreted.  Report is available under media tab.  The patient is established with a sleep provider and follow-up will be arranged.

## 2020-11-09 ENCOUNTER — TELEPHONE (OUTPATIENT)
Dept: SLEEP MEDICINE | Facility: CLINIC | Age: 40
End: 2020-11-09

## 2020-11-09 DIAGNOSIS — G47.33 OSA (OBSTRUCTIVE SLEEP APNEA): Primary | ICD-10-CM

## 2020-11-09 LAB
FINAL PATHOLOGIC DIAGNOSIS: NORMAL
GROSS: NORMAL
Lab: NORMAL

## 2020-11-23 ENCOUNTER — PATIENT MESSAGE (OUTPATIENT)
Dept: ENDOCRINOLOGY | Facility: CLINIC | Age: 40
End: 2020-11-23

## 2020-11-23 ENCOUNTER — PATIENT MESSAGE (OUTPATIENT)
Dept: SLEEP MEDICINE | Facility: CLINIC | Age: 40
End: 2020-11-23

## 2020-11-23 DIAGNOSIS — G47.33 OSA (OBSTRUCTIVE SLEEP APNEA): Primary | ICD-10-CM

## 2020-12-09 ENCOUNTER — OFFICE VISIT (OUTPATIENT)
Dept: ENDOCRINOLOGY | Facility: CLINIC | Age: 40
End: 2020-12-09
Payer: COMMERCIAL

## 2020-12-09 VITALS
WEIGHT: 315 LBS | OXYGEN SATURATION: 98 % | HEART RATE: 89 BPM | HEIGHT: 72 IN | BODY MASS INDEX: 42.66 KG/M2 | DIASTOLIC BLOOD PRESSURE: 60 MMHG | SYSTOLIC BLOOD PRESSURE: 135 MMHG

## 2020-12-09 DIAGNOSIS — Z96.41 INSULIN PUMP IN PLACE: ICD-10-CM

## 2020-12-09 DIAGNOSIS — E10.65 TYPE 1 DIABETES MELLITUS WITH HYPERGLYCEMIA: Primary | ICD-10-CM

## 2020-12-09 DIAGNOSIS — I10 ESSENTIAL HYPERTENSION: ICD-10-CM

## 2020-12-09 DIAGNOSIS — E66.01 CLASS 3 SEVERE OBESITY DUE TO EXCESS CALORIES WITH SERIOUS COMORBIDITY AND BODY MASS INDEX (BMI) OF 40.0 TO 44.9 IN ADULT: ICD-10-CM

## 2020-12-09 PROCEDURE — 99214 OFFICE O/P EST MOD 30 MIN: CPT | Mod: S$GLB,,, | Performed by: INTERNAL MEDICINE

## 2020-12-09 PROCEDURE — 3008F BODY MASS INDEX DOCD: CPT | Mod: CPTII,S$GLB,, | Performed by: INTERNAL MEDICINE

## 2020-12-09 PROCEDURE — 3008F PR BODY MASS INDEX (BMI) DOCUMENTED: ICD-10-PCS | Mod: CPTII,S$GLB,, | Performed by: INTERNAL MEDICINE

## 2020-12-09 PROCEDURE — 3052F PR MOST RECENT HEMOGLOBIN A1C LEVEL 8.0 - < 9.0%: ICD-10-PCS | Mod: CPTII,S$GLB,, | Performed by: INTERNAL MEDICINE

## 2020-12-09 PROCEDURE — 3052F HG A1C>EQUAL 8.0%<EQUAL 9.0%: CPT | Mod: CPTII,S$GLB,, | Performed by: INTERNAL MEDICINE

## 2020-12-09 PROCEDURE — 1126F PR PAIN SEVERITY QUANTIFIED, NO PAIN PRESENT: ICD-10-PCS | Mod: S$GLB,,, | Performed by: INTERNAL MEDICINE

## 2020-12-09 PROCEDURE — 1126F AMNT PAIN NOTED NONE PRSNT: CPT | Mod: S$GLB,,, | Performed by: INTERNAL MEDICINE

## 2020-12-09 PROCEDURE — 99214 PR OFFICE/OUTPT VISIT, EST, LEVL IV, 30-39 MIN: ICD-10-PCS | Mod: S$GLB,,, | Performed by: INTERNAL MEDICINE

## 2020-12-09 RX ORDER — TADALAFIL 5 MG/1
5 TABLET ORAL DAILY PRN
Qty: 30 TABLET | Refills: 5 | Status: SHIPPED | OUTPATIENT
Start: 2020-12-09 | End: 2021-03-17

## 2020-12-09 NOTE — PROGRESS NOTES
Subjective:      Chief Complaint: Follow-up and Diabetes (DM1)    HPI: Clay Waters is a 40 y.o. male who is here for a follow-up evaluation for diabetes. Last seen 9/9/2020    With regards to the diabetes:  Diagnosed with T1DM since around 9 years old.     Known complications:    Retinopathy? No, overdue for f/u    Nephropathy? No    Neuropathy? No    CAD? No    CVA? No    Current regimen:   Pump. 670G since July 2020, with sensor. Changes site every 3-4 days or so for the most part.     Settings reviewed:   basal: 2u/hr   Carb 1:5   SF  40        AIT 3 hrs     Prior treatments:   basal/bolus    Self-Monitored blood glucose.  # times a day testing: using CGM  Log reviewed: yes, has meters     Pre breakfast: 70-160s   dinner: 150-220   qHS: 150-300    Hypoglycemic events? Yes, down to 30    Current symptoms:   polyuria and vision changes (vision worse after a fall).  Pt denies:   polydipsia, nausea, vomit and diarrhea    Diabetes Management Status    Statin: Not taking  ACE/ARB: Taking    Screening or Prevention Patient's value Goal Complete/Controlled?   HgA1C Testing and Control   Lab Results   Component Value Date    HGBA1C 8.0 (H) 09/09/2020      q6months/Less than 7% No   Lipid profile : 01/02/2020 Annually Yes   LDL control Lab Results   Component Value Date    LDLCALC 78.8 01/02/2020    Annually/Less than 100 mg/dl  Yes   Nephropathy screening Lab Results   Component Value Date    MICALBCREAT 6.3 09/09/2020     Lab Results   Component Value Date    CREATININE 1.2 09/09/2020     Lab Results   Component Value Date    PROTEINUA Negative 11/09/2017    Annually Yes   Blood pressure BP Readings from Last 1 Encounters:   12/09/20 135/60    Less than 140/90 Yes   Dilated retinal exam Most Recent Eye Exam Date: Not Found Annually No   Foot exam   : 07/10/2019 Annually No     Reviewed past medical, family, social history and updated as appropriate.    Review of Systems   Constitutional: Positive for  unexpected weight change (gained weight).   HENT: Negative for trouble swallowing.    Eyes: Positive for visual disturbance (after a fall).   Respiratory: Negative for shortness of breath.    Cardiovascular: Negative for palpitations.   Gastrointestinal: Negative for constipation and diarrhea.   Endocrine: Negative for polydipsia and polyuria (when sugar is high).   Genitourinary: Negative for frequency.        ED sometimes   Neurological: Negative for light-headedness.   Psychiatric/Behavioral: Negative for sleep disturbance.     Objective:     Vitals:    12/09/20 1323   BP: 135/60   Pulse: 89     BP Readings from Last 5 Encounters:   12/09/20 135/60   11/04/20 111/65   09/30/20 118/79   09/15/20 138/78   09/09/20 125/70     Physical Exam  Vitals signs reviewed.   Constitutional:       General: He is not in acute distress.     Appearance: He is obese.   Neck:      Thyroid: No thyromegaly.   Cardiovascular:      Heart sounds: Normal heart sounds.   Pulmonary:      Effort: Pulmonary effort is normal.       Wt Readings from Last 10 Encounters:   12/09/20 1323 (!) 145.6 kg (320 lb 15.8 oz)   11/04/20 0828 136.1 kg (300 lb)   09/30/20 1059 (!) 140.7 kg (310 lb 3 oz)   09/16/20 1400 136.1 kg (300 lb)   09/15/20 1302 (!) 141 kg (310 lb 13.6 oz)   09/09/20 1423 (!) 141.5 kg (311 lb 15.2 oz)   07/10/20 1247 131.5 kg (290 lb)   01/13/20 1306 134 kg (295 lb 6.7 oz)   01/01/20 1515 134.5 kg (296 lb 8.3 oz)   01/01/20 1150 127 kg (280 lb)   12/30/19 1955 127 kg (280 lb)       Lab Results   Component Value Date    HGBA1C 8.0 (H) 09/09/2020     Lab Results   Component Value Date    CHOL 144 01/02/2020    HDL 54 01/02/2020    LDLCALC 78.8 01/02/2020    TRIG 56 01/02/2020    CHOLHDL 37.5 01/02/2020     Lab Results   Component Value Date     09/09/2020    K 4.0 09/09/2020     09/09/2020    CO2 23 09/09/2020    GLU 79 09/09/2020    BUN 18 09/09/2020    CREATININE 1.2 09/09/2020    CALCIUM 9.1 09/09/2020    PROT 7.2  11/09/2017    ALBUMIN 3.1 (L) 10/03/2018    BILITOT 0.8 11/09/2017    ALKPHOS 103 11/09/2017    AST 14 11/09/2017    ALT 12 11/09/2017    ANIONGAP 11 09/09/2020    ESTGFRAFRICA >60 09/09/2020    EGFRNONAA >60 09/09/2020    TSH 1.244 09/09/2020      Lab Results   Component Value Date    MICALBCREAT 6.3 09/09/2020       Assessment/Plan:     Type 1 diabetes mellitus with hyperglycemia  Reviewed goals of therapy - to get the best control we can without hypoglycemia. Goal <7, maybe down to 6.5 if possible   - last A1C high    Medication changes:    adjust basal rate up in evening since pt kicked out of auto mode fairly often   - encourage pt to reach out to the company about 770 upgrade  Reviewed patient's current insulin regimen. Clarified proper insulin dose and timing in relation to meals, etc. Insulin injection sites and proper rotation instructed.   -Advised frequent self blood glucose monitoring. Patient encouraged to document glucose results and bring them to every clinic visit  -Close adherence to lifestyle changes recommended.    -Periodic follow ups for eye evaluations, foot care suggested.      Essential hypertension  Hx HTN   bp controlled today. Off meds   continue to monitor. Restart ace/arb if needed      Insulin pump in place  Adjust rates as above/AVS   see about upgrade    Class 3 severe obesity due to excess calories with serious comorbidity in adult  BMI 43.5   - complicated by diabetes   - pt working on diet/exercise, encourage him to keep it up   - monitor weight    ED: Trial of cialis. Otherwise improve glycemic control as above    Follow up in about 3 months (around 3/9/2021) for lab review, further monitoring.      Jesus Healy MD  Endocrinology

## 2020-12-09 NOTE — PATIENT INSTRUCTIONS
For the diabetes:    Adjust basal rates:    12a: 2 units/hr  4p: 2.1 units/hr  8p: 2.0 units/hr    Continue other settings    Think about 770G if eligible.

## 2020-12-09 NOTE — ASSESSMENT & PLAN NOTE
BMI 43.5   - complicated by diabetes   - pt working on diet/exercise, encourage him to keep it up   - monitor weight

## 2020-12-09 NOTE — ASSESSMENT & PLAN NOTE
Reviewed goals of therapy - to get the best control we can without hypoglycemia. Goal <7, maybe down to 6.5 if possible   - last A1C high    Medication changes:    adjust basal rate up in evening since pt kicked out of auto mode fairly often   - encourage pt to reach out to the company about 770 upgrade  Reviewed patient's current insulin regimen. Clarified proper insulin dose and timing in relation to meals, etc. Insulin injection sites and proper rotation instructed.   -Advised frequent self blood glucose monitoring. Patient encouraged to document glucose results and bring them to every clinic visit  -Close adherence to lifestyle changes recommended.    -Periodic follow ups for eye evaluations, foot care suggested.

## 2021-01-15 ENCOUNTER — PATIENT MESSAGE (OUTPATIENT)
Dept: ENDOCRINOLOGY | Facility: CLINIC | Age: 41
End: 2021-01-15

## 2021-01-15 RX ORDER — LOSARTAN POTASSIUM 50 MG/1
50 TABLET ORAL DAILY
Qty: 30 TABLET | Refills: 11 | Status: SHIPPED | OUTPATIENT
Start: 2021-01-15 | End: 2022-01-11

## 2021-03-10 ENCOUNTER — LAB VISIT (OUTPATIENT)
Dept: LAB | Facility: OTHER | Age: 41
End: 2021-03-10
Attending: INTERNAL MEDICINE
Payer: COMMERCIAL

## 2021-03-10 DIAGNOSIS — E10.65 TYPE 1 DIABETES MELLITUS WITH HYPERGLYCEMIA: ICD-10-CM

## 2021-03-10 LAB
ANION GAP SERPL CALC-SCNC: 8 MMOL/L (ref 8–16)
BUN SERPL-MCNC: 17 MG/DL (ref 6–20)
CALCIUM SERPL-MCNC: 9.6 MG/DL (ref 8.7–10.5)
CHLORIDE SERPL-SCNC: 102 MMOL/L (ref 95–110)
CO2 SERPL-SCNC: 27 MMOL/L (ref 23–29)
CREAT SERPL-MCNC: 1.1 MG/DL (ref 0.5–1.4)
EST. GFR  (AFRICAN AMERICAN): >60 ML/MIN/1.73 M^2
EST. GFR  (NON AFRICAN AMERICAN): >60 ML/MIN/1.73 M^2
GLUCOSE SERPL-MCNC: 270 MG/DL (ref 70–110)
POTASSIUM SERPL-SCNC: 4.8 MMOL/L (ref 3.5–5.1)
SODIUM SERPL-SCNC: 137 MMOL/L (ref 136–145)

## 2021-03-10 PROCEDURE — 80048 BASIC METABOLIC PNL TOTAL CA: CPT | Performed by: INTERNAL MEDICINE

## 2021-03-10 PROCEDURE — 36415 COLL VENOUS BLD VENIPUNCTURE: CPT | Performed by: INTERNAL MEDICINE

## 2021-03-10 PROCEDURE — 83036 HEMOGLOBIN GLYCOSYLATED A1C: CPT | Performed by: INTERNAL MEDICINE

## 2021-03-11 LAB
ESTIMATED AVG GLUCOSE: 194 MG/DL (ref 68–131)
HBA1C MFR BLD: 8.4 % (ref 4–5.6)

## 2021-03-17 ENCOUNTER — OFFICE VISIT (OUTPATIENT)
Dept: ENDOCRINOLOGY | Facility: CLINIC | Age: 41
End: 2021-03-17
Payer: COMMERCIAL

## 2021-03-17 VITALS
WEIGHT: 315 LBS | OXYGEN SATURATION: 97 % | BODY MASS INDEX: 42.66 KG/M2 | SYSTOLIC BLOOD PRESSURE: 131 MMHG | HEART RATE: 96 BPM | HEIGHT: 72 IN | DIASTOLIC BLOOD PRESSURE: 66 MMHG

## 2021-03-17 DIAGNOSIS — I10 ESSENTIAL HYPERTENSION: ICD-10-CM

## 2021-03-17 DIAGNOSIS — E10.65 TYPE 1 DIABETES MELLITUS WITH HYPERGLYCEMIA: Primary | ICD-10-CM

## 2021-03-17 DIAGNOSIS — E66.01 CLASS 3 SEVERE OBESITY DUE TO EXCESS CALORIES WITH SERIOUS COMORBIDITY AND BODY MASS INDEX (BMI) OF 40.0 TO 44.9 IN ADULT: ICD-10-CM

## 2021-03-17 PROCEDURE — 99214 PR OFFICE/OUTPT VISIT, EST, LEVL IV, 30-39 MIN: ICD-10-PCS | Mod: S$GLB,,, | Performed by: INTERNAL MEDICINE

## 2021-03-17 PROCEDURE — 1126F PR PAIN SEVERITY QUANTIFIED, NO PAIN PRESENT: ICD-10-PCS | Mod: S$GLB,,, | Performed by: INTERNAL MEDICINE

## 2021-03-17 PROCEDURE — 3052F PR MOST RECENT HEMOGLOBIN A1C LEVEL 8.0 - < 9.0%: ICD-10-PCS | Mod: CPTII,S$GLB,, | Performed by: INTERNAL MEDICINE

## 2021-03-17 PROCEDURE — 1126F AMNT PAIN NOTED NONE PRSNT: CPT | Mod: S$GLB,,, | Performed by: INTERNAL MEDICINE

## 2021-03-17 PROCEDURE — 3008F BODY MASS INDEX DOCD: CPT | Mod: CPTII,S$GLB,, | Performed by: INTERNAL MEDICINE

## 2021-03-17 PROCEDURE — 3008F PR BODY MASS INDEX (BMI) DOCUMENTED: ICD-10-PCS | Mod: CPTII,S$GLB,, | Performed by: INTERNAL MEDICINE

## 2021-03-17 PROCEDURE — 99214 OFFICE O/P EST MOD 30 MIN: CPT | Mod: S$GLB,,, | Performed by: INTERNAL MEDICINE

## 2021-03-17 PROCEDURE — 3052F HG A1C>EQUAL 8.0%<EQUAL 9.0%: CPT | Mod: CPTII,S$GLB,, | Performed by: INTERNAL MEDICINE

## 2021-03-17 RX ORDER — TADALAFIL 10 MG/1
10 TABLET ORAL DAILY PRN
Qty: 30 TABLET | Refills: 5 | Status: SHIPPED | OUTPATIENT
Start: 2021-03-17 | End: 2021-09-22

## 2021-04-11 ENCOUNTER — OFFICE VISIT (OUTPATIENT)
Dept: URGENT CARE | Facility: CLINIC | Age: 41
End: 2021-04-11
Payer: COMMERCIAL

## 2021-04-11 VITALS
HEART RATE: 77 BPM | HEIGHT: 72 IN | OXYGEN SATURATION: 97 % | RESPIRATION RATE: 15 BRPM | TEMPERATURE: 99 F | BODY MASS INDEX: 42.66 KG/M2 | WEIGHT: 315 LBS | DIASTOLIC BLOOD PRESSURE: 82 MMHG | SYSTOLIC BLOOD PRESSURE: 148 MMHG

## 2021-04-11 DIAGNOSIS — R06.2 WHEEZING: ICD-10-CM

## 2021-04-11 DIAGNOSIS — J06.9 VIRAL URI WITH COUGH: Primary | ICD-10-CM

## 2021-04-11 DIAGNOSIS — R05.9 COUGH: ICD-10-CM

## 2021-04-11 LAB
CTP QC/QA: YES
SARS-COV-2 RDRP RESP QL NAA+PROBE: NEGATIVE

## 2021-04-11 PROCEDURE — U0002 COVID-19 LAB TEST NON-CDC: HCPCS | Mod: QW,S$GLB,, | Performed by: PHYSICIAN ASSISTANT

## 2021-04-11 PROCEDURE — 99214 OFFICE O/P EST MOD 30 MIN: CPT | Mod: S$GLB,,, | Performed by: PHYSICIAN ASSISTANT

## 2021-04-11 PROCEDURE — 99214 PR OFFICE/OUTPT VISIT, EST, LEVL IV, 30-39 MIN: ICD-10-PCS | Mod: S$GLB,,, | Performed by: PHYSICIAN ASSISTANT

## 2021-04-11 PROCEDURE — 3008F BODY MASS INDEX DOCD: CPT | Mod: CPTII,S$GLB,, | Performed by: PHYSICIAN ASSISTANT

## 2021-04-11 PROCEDURE — U0002: ICD-10-PCS | Mod: QW,S$GLB,, | Performed by: PHYSICIAN ASSISTANT

## 2021-04-11 PROCEDURE — 3008F PR BODY MASS INDEX (BMI) DOCUMENTED: ICD-10-PCS | Mod: CPTII,S$GLB,, | Performed by: PHYSICIAN ASSISTANT

## 2021-04-11 RX ORDER — ALBUTEROL SULFATE 90 UG/1
2 AEROSOL, METERED RESPIRATORY (INHALATION) EVERY 6 HOURS PRN
Qty: 18 G | Refills: 0 | Status: SHIPPED | OUTPATIENT
Start: 2021-04-11 | End: 2022-02-16 | Stop reason: ALTCHOICE

## 2021-04-12 ENCOUNTER — HOSPITAL ENCOUNTER (EMERGENCY)
Facility: HOSPITAL | Age: 41
Discharge: HOME | End: 2021-04-12
Attending: EMERGENCY MEDICINE
Payer: COMMERCIAL

## 2021-04-12 VITALS
HEART RATE: 92 BPM | TEMPERATURE: 98 F | SYSTOLIC BLOOD PRESSURE: 130 MMHG | OXYGEN SATURATION: 98 % | RESPIRATION RATE: 21 BRPM | HEIGHT: 72 IN | DIASTOLIC BLOOD PRESSURE: 71 MMHG | WEIGHT: 315 LBS | BODY MASS INDEX: 42.66 KG/M2

## 2021-04-12 DIAGNOSIS — J40 BRONCHITIS: Primary | ICD-10-CM

## 2021-04-12 DIAGNOSIS — R05.9 COUGH IN ADULT PATIENT: ICD-10-CM

## 2021-04-12 LAB
CTP QC/QA: YES
CTP QC/QA: YES
GLUCOSE SERPL-MCNC: 184 MG/DL (ref 70–110)
POC MOLECULAR INFLUENZA A AGN: NEGATIVE
POC MOLECULAR INFLUENZA B AGN: NEGATIVE
SARS-COV-2 RDRP RESP QL NAA+PROBE: NEGATIVE

## 2021-04-12 PROCEDURE — 87502 INFLUENZA DNA AMP PROBE: CPT

## 2021-04-12 PROCEDURE — 99284 EMERGENCY DEPT VISIT MOD MDM: CPT | Mod: 25

## 2021-04-12 PROCEDURE — 99285 PR EMERGENCY DEPT VISIT,LEVEL V: ICD-10-PCS | Mod: CR,CS,, | Performed by: EMERGENCY MEDICINE

## 2021-04-12 PROCEDURE — 99285 EMERGENCY DEPT VISIT HI MDM: CPT | Mod: CR,CS,, | Performed by: EMERGENCY MEDICINE

## 2021-04-12 PROCEDURE — U0002 COVID-19 LAB TEST NON-CDC: HCPCS | Performed by: EMERGENCY MEDICINE

## 2021-04-12 RX ORDER — AZITHROMYCIN 250 MG/1
250 TABLET, FILM COATED ORAL DAILY
Qty: 6 TABLET | Refills: 0 | Status: SHIPPED | OUTPATIENT
Start: 2021-04-12 | End: 2022-02-16 | Stop reason: ALTCHOICE

## 2021-04-15 ENCOUNTER — PATIENT MESSAGE (OUTPATIENT)
Dept: RESEARCH | Facility: HOSPITAL | Age: 41
End: 2021-04-15

## 2021-04-21 ENCOUNTER — TELEPHONE (OUTPATIENT)
Dept: ENDOCRINOLOGY | Facility: CLINIC | Age: 41
End: 2021-04-21

## 2021-06-08 ENCOUNTER — PATIENT MESSAGE (OUTPATIENT)
Dept: ENDOCRINOLOGY | Facility: CLINIC | Age: 41
End: 2021-06-08

## 2021-06-22 ENCOUNTER — TELEPHONE (OUTPATIENT)
Dept: INTERNAL MEDICINE | Facility: CLINIC | Age: 41
End: 2021-06-22

## 2021-06-23 ENCOUNTER — OFFICE VISIT (OUTPATIENT)
Dept: ENDOCRINOLOGY | Facility: CLINIC | Age: 41
End: 2021-06-23
Payer: COMMERCIAL

## 2021-06-23 ENCOUNTER — LAB VISIT (OUTPATIENT)
Dept: LAB | Facility: OTHER | Age: 41
End: 2021-06-23
Attending: INTERNAL MEDICINE
Payer: COMMERCIAL

## 2021-06-23 VITALS
BODY MASS INDEX: 42.66 KG/M2 | HEART RATE: 77 BPM | DIASTOLIC BLOOD PRESSURE: 63 MMHG | OXYGEN SATURATION: 98 % | WEIGHT: 315 LBS | SYSTOLIC BLOOD PRESSURE: 139 MMHG | HEIGHT: 72 IN | TEMPERATURE: 98 F

## 2021-06-23 DIAGNOSIS — I10 ESSENTIAL HYPERTENSION: ICD-10-CM

## 2021-06-23 DIAGNOSIS — E66.01 CLASS 3 SEVERE OBESITY DUE TO EXCESS CALORIES WITH SERIOUS COMORBIDITY AND BODY MASS INDEX (BMI) OF 45.0 TO 49.9 IN ADULT: ICD-10-CM

## 2021-06-23 DIAGNOSIS — E10.65 TYPE 1 DIABETES MELLITUS WITH HYPERGLYCEMIA: Primary | ICD-10-CM

## 2021-06-23 DIAGNOSIS — E10.65 TYPE 1 DIABETES MELLITUS WITH HYPERGLYCEMIA: ICD-10-CM

## 2021-06-23 DIAGNOSIS — Z96.41 INSULIN PUMP IN PLACE: ICD-10-CM

## 2021-06-23 LAB
ANION GAP SERPL CALC-SCNC: 9 MMOL/L (ref 8–16)
BUN SERPL-MCNC: 17 MG/DL (ref 6–20)
CALCIUM SERPL-MCNC: 8.9 MG/DL (ref 8.7–10.5)
CHLORIDE SERPL-SCNC: 104 MMOL/L (ref 95–110)
CO2 SERPL-SCNC: 23 MMOL/L (ref 23–29)
CREAT SERPL-MCNC: 1.3 MG/DL (ref 0.5–1.4)
EST. GFR  (AFRICAN AMERICAN): >60 ML/MIN/1.73 M^2
EST. GFR  (NON AFRICAN AMERICAN): >60 ML/MIN/1.73 M^2
ESTIMATED AVG GLUCOSE: 180 MG/DL (ref 68–131)
GLUCOSE SERPL-MCNC: 255 MG/DL (ref 70–110)
HBA1C MFR BLD: 7.9 % (ref 4–5.6)
POTASSIUM SERPL-SCNC: 4.4 MMOL/L (ref 3.5–5.1)
SODIUM SERPL-SCNC: 136 MMOL/L (ref 136–145)

## 2021-06-23 PROCEDURE — 3051F PR MOST RECENT HEMOGLOBIN A1C LEVEL 7.0 - < 8.0%: ICD-10-PCS | Mod: CPTII,S$GLB,, | Performed by: INTERNAL MEDICINE

## 2021-06-23 PROCEDURE — 3051F HG A1C>EQUAL 7.0%<8.0%: CPT | Mod: CPTII,S$GLB,, | Performed by: INTERNAL MEDICINE

## 2021-06-23 PROCEDURE — 99214 PR OFFICE/OUTPT VISIT, EST, LEVL IV, 30-39 MIN: ICD-10-PCS | Mod: S$GLB,,, | Performed by: INTERNAL MEDICINE

## 2021-06-23 PROCEDURE — 99214 OFFICE O/P EST MOD 30 MIN: CPT | Mod: S$GLB,,, | Performed by: INTERNAL MEDICINE

## 2021-06-23 PROCEDURE — 1126F AMNT PAIN NOTED NONE PRSNT: CPT | Mod: S$GLB,,, | Performed by: INTERNAL MEDICINE

## 2021-06-23 PROCEDURE — 36415 COLL VENOUS BLD VENIPUNCTURE: CPT | Performed by: INTERNAL MEDICINE

## 2021-06-23 PROCEDURE — 83036 HEMOGLOBIN GLYCOSYLATED A1C: CPT | Performed by: INTERNAL MEDICINE

## 2021-06-23 PROCEDURE — 3008F BODY MASS INDEX DOCD: CPT | Mod: CPTII,S$GLB,, | Performed by: INTERNAL MEDICINE

## 2021-06-23 PROCEDURE — 1126F PR PAIN SEVERITY QUANTIFIED, NO PAIN PRESENT: ICD-10-PCS | Mod: S$GLB,,, | Performed by: INTERNAL MEDICINE

## 2021-06-23 PROCEDURE — 80048 BASIC METABOLIC PNL TOTAL CA: CPT | Performed by: INTERNAL MEDICINE

## 2021-06-23 PROCEDURE — 3008F PR BODY MASS INDEX (BMI) DOCUMENTED: ICD-10-PCS | Mod: CPTII,S$GLB,, | Performed by: INTERNAL MEDICINE

## 2021-06-24 ENCOUNTER — TELEPHONE (OUTPATIENT)
Dept: ENDOCRINOLOGY | Facility: CLINIC | Age: 41
End: 2021-06-24

## 2021-06-25 ENCOUNTER — TELEPHONE (OUTPATIENT)
Dept: ENDOCRINOLOGY | Facility: CLINIC | Age: 41
End: 2021-06-25

## 2021-07-28 ENCOUNTER — TELEPHONE (OUTPATIENT)
Dept: ENDOCRINOLOGY | Facility: CLINIC | Age: 41
End: 2021-07-28

## 2021-07-28 DIAGNOSIS — E10.65 TYPE 1 DIABETES MELLITUS WITH HYPERGLYCEMIA: Primary | ICD-10-CM

## 2021-08-18 ENCOUNTER — TELEPHONE (OUTPATIENT)
Dept: ENDOCRINOLOGY | Facility: CLINIC | Age: 41
End: 2021-08-18

## 2021-09-15 ENCOUNTER — TELEPHONE (OUTPATIENT)
Dept: ENDOCRINOLOGY | Facility: CLINIC | Age: 41
End: 2021-09-15

## 2021-09-17 ENCOUNTER — HOSPITAL ENCOUNTER (EMERGENCY)
Facility: HOSPITAL | Age: 41
Discharge: HOME | End: 2021-09-17
Attending: EMERGENCY MEDICINE
Payer: COMMERCIAL

## 2021-09-17 VITALS
SYSTOLIC BLOOD PRESSURE: 161 MMHG | WEIGHT: 315 LBS | BODY MASS INDEX: 43.4 KG/M2 | DIASTOLIC BLOOD PRESSURE: 90 MMHG | OXYGEN SATURATION: 99 % | RESPIRATION RATE: 18 BRPM | TEMPERATURE: 99 F | HEART RATE: 95 BPM

## 2021-09-17 DIAGNOSIS — L03.115 CELLULITIS OF RIGHT LOWER EXTREMITY: Primary | ICD-10-CM

## 2021-09-17 PROCEDURE — 99284 PR EMERGENCY DEPT VISIT,LEVEL IV: ICD-10-PCS | Mod: ,,, | Performed by: EMERGENCY MEDICINE

## 2021-09-17 PROCEDURE — 99283 EMERGENCY DEPT VISIT LOW MDM: CPT

## 2021-09-17 PROCEDURE — 99284 EMERGENCY DEPT VISIT MOD MDM: CPT | Mod: ,,, | Performed by: EMERGENCY MEDICINE

## 2021-09-17 RX ORDER — DOXYCYCLINE 100 MG/1
100 CAPSULE ORAL 2 TIMES DAILY
Qty: 20 CAPSULE | Refills: 0 | Status: SHIPPED | OUTPATIENT
Start: 2021-09-17 | End: 2021-09-27

## 2021-09-19 ENCOUNTER — HOSPITAL ENCOUNTER (EMERGENCY)
Facility: HOSPITAL | Age: 41
Discharge: HOME | End: 2021-09-19
Attending: EMERGENCY MEDICINE
Payer: COMMERCIAL

## 2021-09-19 VITALS
WEIGHT: 315 LBS | RESPIRATION RATE: 18 BRPM | HEART RATE: 98 BPM | TEMPERATURE: 98 F | BODY MASS INDEX: 43.4 KG/M2 | SYSTOLIC BLOOD PRESSURE: 148 MMHG | OXYGEN SATURATION: 98 % | DIASTOLIC BLOOD PRESSURE: 72 MMHG

## 2021-09-19 DIAGNOSIS — L03.115 CELLULITIS OF RIGHT LOWER EXTREMITY: Primary | ICD-10-CM

## 2021-09-19 LAB
BUN SERPL-MCNC: 16 MG/DL (ref 6–30)
CHLORIDE SERPL-SCNC: 99 MMOL/L (ref 95–110)
CREAT SERPL-MCNC: 0.8 MG/DL (ref 0.5–1.4)
GLUCOSE SERPL-MCNC: 479 MG/DL (ref 70–110)
HCT VFR BLD CALC: 45 %PCV (ref 36–54)
POC IONIZED CALCIUM: 1.16 MMOL/L (ref 1.06–1.42)
POC TCO2 (MEASURED): 22 MMOL/L (ref 23–29)
POTASSIUM BLD-SCNC: 4.3 MMOL/L (ref 3.5–5.1)
SAMPLE: ABNORMAL
SODIUM BLD-SCNC: 134 MMOL/L (ref 136–145)

## 2021-09-19 PROCEDURE — 99284 PR EMERGENCY DEPT VISIT,LEVEL IV: ICD-10-PCS | Mod: ,,, | Performed by: EMERGENCY MEDICINE

## 2021-09-19 PROCEDURE — 99283 EMERGENCY DEPT VISIT LOW MDM: CPT | Mod: 25

## 2021-09-19 PROCEDURE — 80047 BASIC METABLC PNL IONIZED CA: CPT

## 2021-09-19 PROCEDURE — 99284 EMERGENCY DEPT VISIT MOD MDM: CPT | Mod: ,,, | Performed by: EMERGENCY MEDICINE

## 2021-09-19 PROCEDURE — 25000003 PHARM REV CODE 250: Performed by: EMERGENCY MEDICINE

## 2021-09-19 RX ORDER — SULFAMETHOXAZOLE AND TRIMETHOPRIM 800; 160 MG/1; MG/1
1 TABLET ORAL
Status: COMPLETED | OUTPATIENT
Start: 2021-09-19 | End: 2021-09-19

## 2021-09-19 RX ORDER — SULFAMETHOXAZOLE AND TRIMETHOPRIM 800; 160 MG/1; MG/1
1 TABLET ORAL 2 TIMES DAILY
Qty: 20 TABLET | Refills: 0 | Status: SHIPPED | OUTPATIENT
Start: 2021-09-19 | End: 2021-09-29

## 2021-09-19 RX ADMIN — SULFAMETHOXAZOLE AND TRIMETHOPRIM 1 TABLET: 800; 160 TABLET ORAL at 05:09

## 2021-10-05 DIAGNOSIS — E11.69 TYPE 2 DIABETES MELLITUS WITH OTHER SPECIFIED COMPLICATION, UNSPECIFIED WHETHER LONG TERM INSULIN USE: Primary | ICD-10-CM

## 2021-10-27 ENCOUNTER — TELEPHONE (OUTPATIENT)
Dept: ADMINISTRATIVE | Facility: HOSPITAL | Age: 41
End: 2021-10-27

## 2021-11-04 ENCOUNTER — TELEPHONE (OUTPATIENT)
Dept: ENDOCRINOLOGY | Facility: CLINIC | Age: 41
End: 2021-11-04

## 2021-12-08 ENCOUNTER — TELEPHONE (OUTPATIENT)
Dept: ADMINISTRATIVE | Facility: HOSPITAL | Age: 41
End: 2021-12-08

## 2021-12-29 ENCOUNTER — TELEPHONE (OUTPATIENT)
Dept: INTERNAL MEDICINE | Facility: CLINIC | Age: 41
End: 2021-12-29

## 2021-12-29 ENCOUNTER — PATIENT MESSAGE (OUTPATIENT)
Dept: ADMINISTRATIVE | Facility: CLINIC | Age: 41
End: 2021-12-29

## 2021-12-29 ENCOUNTER — HOSPITAL ENCOUNTER (EMERGENCY)
Facility: HOSPITAL | Age: 41
Discharge: HOME | End: 2021-12-29
Attending: EMERGENCY MEDICINE
Payer: COMMERCIAL

## 2021-12-29 VITALS
RESPIRATION RATE: 18 BRPM | HEIGHT: 72 IN | DIASTOLIC BLOOD PRESSURE: 75 MMHG | TEMPERATURE: 99 F | BODY MASS INDEX: 42.66 KG/M2 | HEART RATE: 103 BPM | WEIGHT: 315 LBS | SYSTOLIC BLOOD PRESSURE: 159 MMHG | OXYGEN SATURATION: 98 %

## 2021-12-29 DIAGNOSIS — U07.1 COVID-19 VIRUS INFECTION: Primary | ICD-10-CM

## 2021-12-29 DIAGNOSIS — R06.02 SHORTNESS OF BREATH: ICD-10-CM

## 2021-12-29 PROCEDURE — 99284 PR EMERGENCY DEPT VISIT,LEVEL IV: ICD-10-PCS | Mod: CR,CS,, | Performed by: PHYSICIAN ASSISTANT

## 2021-12-29 PROCEDURE — 99284 EMERGENCY DEPT VISIT MOD MDM: CPT | Mod: CR,CS,, | Performed by: PHYSICIAN ASSISTANT

## 2021-12-29 PROCEDURE — 99283 EMERGENCY DEPT VISIT LOW MDM: CPT | Mod: 25

## 2021-12-29 NOTE — DISCHARGE INSTRUCTIONS
Your COVID test is positive.   Per CDC guidelines, you should stay at home for 5 days.  If you have no symptoms or your symptoms are resolving after 5 days, you can leave your house.  You should continue to wear a mask around others for 5 additional days.  If you continue to have a fever, you should stay home until your fever resolves without medications to reduce the fever.    I recommend staying well hydrated, getting plenty of sleep, taking Tylenol or ibuprofen for fevers or pain.    Any severe shortness of breath, uncontrolled vomiting, or other symptoms that you believe constitute an emergency, are a reason to return to the hospital.

## 2021-12-29 NOTE — TELEPHONE ENCOUNTER
----- Message from Nai Stovall sent at 12/29/2021  2:26 PM CST -----  Contact: via portal  ##Sent a msg a few mins ago and realized patient wasn't attached. My apologies for the inconvenience.     Appointment Request From: Clay Waters    With Provider: Forest Monroe MD [[facility]]    Preferred Date Range: 12/29/2021 - 1/4/2022    Preferred Times: Any Time    Reason for visit: Positive Covid test    Comments:  Covid recovery

## 2021-12-29 NOTE — ED TRIAGE NOTES
Clay Waters, a 41 y.o. male presents to the ED w/ complaint of COVID positive and wants infusion    Triage note:  Chief Complaint   Patient presents with    covid Positive     Wanting infusion     Review of patient's allergies indicates:   Allergen Reactions    Vicodin [hydrocodone-acetaminophen] Nausea And Vomiting     Past Medical History:   Diagnosis Date    Childhood asthma     Class 3 severe obesity due to excess calories with serious comorbidity in adult 1/1/2020    Essential hypertension 10/1/2018    Influenza B 1/1/2020    Insulin pump in place 10/1/2018    Lung nodule 10/2/2018    <1cm multiple lung nodules in the setting of staph infection LTNS No history of cancer    Mild intermittent asthma with exacerbation 1/1/2020    Type 1 diabetes mellitus with hyperglycemia 10/1/2018

## 2021-12-29 NOTE — ED PROVIDER NOTES
Encounter Date: 12/29/2021       History     Chief Complaint   Patient presents with    covid Positive     Wanting infusion     41-year-old male with a history of HTN, type 1 DM, asthma, obesity presents to the ED with COVID-19 concerns.  Patient reports shortness of breath, body aches, fatigue, headache, stuffy nose for the past 2 days.  Patient had a positive test today in an outside facility.  He presents to [facility] ED wanting antibody infusion.        Review of patient's allergies indicates:   Allergen Reactions    Vicodin [hydrocodone-acetaminophen] Nausea And Vomiting     Past Medical History:   Diagnosis Date    Childhood asthma     Class 3 severe obesity due to excess calories with serious comorbidity in adult 1/1/2020    Essential hypertension 10/1/2018    Influenza B 1/1/2020    Insulin pump in place 10/1/2018    Lung nodule 10/2/2018    <1cm multiple lung nodules in the setting of staph infection LTNS No history of cancer    Mild intermittent asthma with exacerbation 1/1/2020    Type 1 diabetes mellitus with hyperglycemia 10/1/2018     Past Surgical History:   Procedure Laterality Date    COLONOSCOPY N/A 11/4/2020    Procedure: COLONOSCOPY;  Surgeon: Jesus Claros MD;  Location: [facility] ([address] [place] [address]);  Service: Endoscopy;  Laterality: N/A;    ESOPHAGOGASTRODUODENOSCOPY N/A 11/4/2020    Procedure: EGD (ESOPHAGOGASTRODUODENOSCOPY);  Surgeon: Jesus Claros MD;  Location: [facility] ([address] [place] [address]);  Service: Endoscopy;  Laterality: N/A;  2nd floor for wt/neck. has insulin pump     pt requesting a Wed-doesn't want to wait until December for Dr Dias availability       COVID test at [facility] on 11/1-GT    VASECTOMY       Family History   Problem Relation Age of Onset    Celiac disease Father     Celiac disease Daughter      Social History     Tobacco Use    Smoking status: Never Smoker    Smokeless tobacco: Never Used   Substance Use Topics    Alcohol use: Yes     Comment: occas, none recently     Drug use: No     Review of Systems   Constitutional: Positive for fatigue. Negative for fever.   HENT: Positive for congestion. Negative for sore throat.    Respiratory: Positive for shortness of breath.    Cardiovascular: Negative for chest pain.   Gastrointestinal: Negative for nausea.   Genitourinary: Negative for dysuria.   Musculoskeletal: Positive for myalgias. Negative for back pain.   Skin: Negative for rash.   Neurological: Positive for headaches. Negative for weakness.   Hematological: Does not bruise/bleed easily.       Physical Exam     Initial Vitals [12/29/21 1521]   BP Pulse Resp Temp SpO2   (!) 159/75 103 18 99.4 °F (37.4 °C) 97 %      MAP       --         Physical Exam    Nursing note and vitals reviewed.  Constitutional: He appears well-developed and well-nourished. He is Obese .  Non-toxic appearance. He does not appear ill. No distress.   HENT:   Head: Normocephalic and atraumatic.   Neck: Neck supple.   Normal range of motion.  Cardiovascular: Normal rate and regular rhythm. Exam reveals no gallop, no distant heart sounds and no friction rub.    No murmur heard.  Pulmonary/Chest: Effort normal and breath sounds normal. No accessory muscle usage. No tachypnea. No respiratory distress. He has no decreased breath sounds. He has no wheezes. He has no rhonchi. He has no rales.   Abdominal: He exhibits no distension.   Musculoskeletal:      Cervical back: Normal range of motion and neck supple.     Neurological: He is alert.   Skin: No rash noted.         ED Course   Procedures  Labs Reviewed - No data to display       Imaging Results          X-Ray Chest 1 View (Final result)  Result time 12/29/21 16:09:00   Procedure changed from X-Ray Chest PA And Lateral     Final result by Zachariah Quinonez MD (12/29/21 16:09:00)                 Impression:      No significant abnormalities of the lungs.      Electronically signed by: Zachariah Quinonez MD  Date:    12/29/2021  Time:    16:09             Narrative:     EXAMINATION:  XR CHEST 1 VIEW    CLINICAL HISTORY:  COVID; Shortness of breath    TECHNIQUE:  Single frontal view of the chest was performed.    COMPARISON:  Chest 04/12/2021    FINDINGS:  Heart size is within normal limits.  Mediastinum is unremarkable.    Lungs are well inflated.  No gross lobar consolidations or pneumothorax or pulmonary vascular congestion or pleural effusions.  There are no cavitary lung masses or any significant pulmonary nodules.  No significant airspace opacities.    The visualized ribs demonstrate no significant abnormalities.                                 Medications - No data to display  Medical Decision Making:   History:   Old Medical Records: I decided to obtain old medical records.  Initial Assessment:   Patient presents with COVID like symptoms and a positive test.  No acute distress.  Afebrile.  BP elevated to 159/75.  Other Vitals within normal limits.  Differential Diagnosis:   My differential diagnosis includes but is not limited to:  COVID, flu, viral syndrome, pneumonia, URI   Clinical Tests:   Lab Tests: Ordered  Radiological Study: Ordered  ED Management:  Covid positive.  Ambulates without hypoxia.  Chest x-ray without infiltrates or other emergent process. Quarantine instructions given.   Referral to A ab infusion placed. OTC meds at home discussed. Discharged in stable condition with strict ED return precautions. Patient agreed to plan of care and voiced understanding.                        Clinical Impression:   Final diagnoses:  [R06.02] Shortness of breath  [U07.1] COVID-19 virus infection (Primary)          ED Disposition Condition    Discharge Stable        ED Prescriptions     None        Follow-up Information    None          Marialuisa Kebede PA-C  12/30/21 0392

## 2021-12-29 NOTE — Clinical Note
"Clay"Karishma Waters was seen and treated in our emergency department on 12/29/2021.     COVID-19 is present in our communities across the state. There is limited testing for COVID at this time, so not all patients can be tested. In this situation, your employee meets the following criteria:    Clay Waters has met the criteria for COVID-19 testing and has a POSITIVE result. He can return to work once they are asymptomatic for 72 hours without the use of fever reducing medications AND at least ten days from the first positive result.     If you have any questions or concerns, or if I can be of further assistance, please do not hesitate to contact me.    Sincerely,             Marialuisa Kebede PA-C"

## 2021-12-30 ENCOUNTER — NURSE TRIAGE (OUTPATIENT)
Dept: ADMINISTRATIVE | Facility: CLINIC | Age: 41
End: 2021-12-30

## 2021-12-30 ENCOUNTER — PATIENT MESSAGE (OUTPATIENT)
Dept: ADMINISTRATIVE | Facility: OTHER | Age: 41
End: 2021-12-30

## 2021-12-30 ENCOUNTER — INFUSION (OUTPATIENT)
Dept: INFECTIOUS DISEASES | Facility: HOSPITAL | Age: 41
End: 2021-12-30
Attending: INTERNAL MEDICINE
Payer: COMMERCIAL

## 2021-12-30 ENCOUNTER — TELEPHONE (OUTPATIENT)
Dept: ADMINISTRATIVE | Facility: CLINIC | Age: 41
End: 2021-12-30

## 2021-12-30 VITALS
HEART RATE: 78 BPM | OXYGEN SATURATION: 98 % | RESPIRATION RATE: 16 BRPM | TEMPERATURE: 98 F | WEIGHT: 315 LBS | DIASTOLIC BLOOD PRESSURE: 68 MMHG | BODY MASS INDEX: 42.66 KG/M2 | HEIGHT: 72 IN | SYSTOLIC BLOOD PRESSURE: 131 MMHG

## 2021-12-30 DIAGNOSIS — U07.1 COVID-19 VIRUS INFECTION: Primary | ICD-10-CM

## 2021-12-30 PROCEDURE — 63600175 PHARM REV CODE 636 W HCPCS: Performed by: INTERNAL MEDICINE

## 2021-12-30 PROCEDURE — 25000003 PHARM REV CODE 250: Performed by: INTERNAL MEDICINE

## 2021-12-30 PROCEDURE — M0243 CASIRIVI AND IMDEVI INFUSION: HCPCS | Performed by: INTERNAL MEDICINE

## 2021-12-30 RX ORDER — DIPHENHYDRAMINE HYDROCHLORIDE 50 MG/ML
25 INJECTION INTRAMUSCULAR; INTRAVENOUS ONCE AS NEEDED
Status: DISCONTINUED | OUTPATIENT
Start: 2021-12-30 | End: 2022-02-16

## 2021-12-30 RX ORDER — EPINEPHRINE 0.3 MG/.3ML
0.3 INJECTION SUBCUTANEOUS
Status: DISCONTINUED | OUTPATIENT
Start: 2021-12-30 | End: 2022-02-16

## 2021-12-30 RX ORDER — ONDANSETRON 4 MG/1
4 TABLET, ORALLY DISINTEGRATING ORAL ONCE AS NEEDED
Status: DISCONTINUED | OUTPATIENT
Start: 2021-12-30 | End: 2022-02-16

## 2021-12-30 RX ORDER — ALBUTEROL SULFATE 90 UG/1
2 AEROSOL, METERED RESPIRATORY (INHALATION)
Status: DISCONTINUED | OUTPATIENT
Start: 2021-12-30 | End: 2022-02-16

## 2021-12-30 RX ORDER — SODIUM CHLORIDE 0.9 % (FLUSH) 0.9 %
10 SYRINGE (ML) INJECTION
Status: DISCONTINUED | OUTPATIENT
Start: 2021-12-30 | End: 2022-02-16

## 2021-12-30 RX ADMIN — CASIRIVIMAB AND IMDEVIMAB 600 MG: 600; 600 INJECTION, SOLUTION, CONCENTRATE INTRAVENOUS at 01:12

## 2021-12-31 ENCOUNTER — PATIENT MESSAGE (OUTPATIENT)
Dept: ADMINISTRATIVE | Facility: OTHER | Age: 41
End: 2021-12-31

## 2022-01-01 ENCOUNTER — PATIENT MESSAGE (OUTPATIENT)
Dept: ADMINISTRATIVE | Facility: OTHER | Age: 42
End: 2022-01-01
Payer: COMMERCIAL

## 2022-01-01 ENCOUNTER — PATIENT MESSAGE (OUTPATIENT)
Dept: ADMINISTRATIVE | Facility: CLINIC | Age: 42
End: 2022-01-01
Payer: COMMERCIAL

## 2022-01-02 ENCOUNTER — PATIENT MESSAGE (OUTPATIENT)
Dept: ADMINISTRATIVE | Facility: OTHER | Age: 42
End: 2022-01-02
Payer: COMMERCIAL

## 2022-01-03 ENCOUNTER — PATIENT MESSAGE (OUTPATIENT)
Dept: ADMINISTRATIVE | Facility: OTHER | Age: 42
End: 2022-01-03
Payer: COMMERCIAL

## 2022-01-04 ENCOUNTER — PATIENT MESSAGE (OUTPATIENT)
Dept: ADMINISTRATIVE | Facility: CLINIC | Age: 42
End: 2022-01-04
Payer: COMMERCIAL

## 2022-01-05 ENCOUNTER — PATIENT MESSAGE (OUTPATIENT)
Dept: ADMINISTRATIVE | Facility: CLINIC | Age: 42
End: 2022-01-05
Payer: COMMERCIAL

## 2022-01-05 ENCOUNTER — NURSE TRIAGE (OUTPATIENT)
Dept: ADMINISTRATIVE | Facility: CLINIC | Age: 42
End: 2022-01-05
Payer: COMMERCIAL

## 2022-01-05 NOTE — TELEPHONE ENCOUNTER
Pt initially escalated due to no response. Pt called. Left message as per below.     This is a registered nurse from [facility] calling from the Covid Surveillance program. I am reaching out to you today because you did not enter your vital signs and symptoms today and  am trying to check on you. Please enter your vitals and symptoms as soon as possible. If you feel that you're are having a medical emergency, please call 911 or go to the nearest Emergency Room. If you wish to speak with a nurse, please call us at [phone]. Thank you.    My chart no response message sent.     Reason for Disposition   Message left on unidentified voice mail. Phone number verified.    Additional Information   Negative: Caller has already spoken with the PCP (or office), and has no further questions   Negative: Caller has already spoken with another triager and has no further questions   Negative: Caller has already spoken with another triager or PCP (or office), and has further questions and triager able to answer questions.    Protocols used: NO CONTACT OR DUPLICATE CONTACT CALL-A-[place]

## 2022-02-07 ENCOUNTER — PATIENT MESSAGE (OUTPATIENT)
Dept: ENDOCRINOLOGY | Facility: CLINIC | Age: 42
End: 2022-02-07
Payer: COMMERCIAL

## 2022-02-07 NOTE — TELEPHONE ENCOUNTER
Moses Waters, you are requesting a refill of your Novolog insulin correct?  Alvin Kwon Office  [facility]

## 2022-02-16 ENCOUNTER — LAB VISIT (OUTPATIENT)
Dept: LAB | Facility: OTHER | Age: 42
End: 2022-02-16
Attending: INTERNAL MEDICINE
Payer: COMMERCIAL

## 2022-02-16 ENCOUNTER — TELEPHONE (OUTPATIENT)
Dept: ENDOCRINOLOGY | Facility: CLINIC | Age: 42
End: 2022-02-16
Payer: COMMERCIAL

## 2022-02-16 ENCOUNTER — TELEPHONE (OUTPATIENT)
Dept: DIABETES | Facility: CLINIC | Age: 42
End: 2022-02-16
Payer: COMMERCIAL

## 2022-02-16 ENCOUNTER — OFFICE VISIT (OUTPATIENT)
Dept: ENDOCRINOLOGY | Facility: CLINIC | Age: 42
End: 2022-02-16
Payer: COMMERCIAL

## 2022-02-16 VITALS
SYSTOLIC BLOOD PRESSURE: 139 MMHG | BODY MASS INDEX: 42.66 KG/M2 | HEART RATE: 93 BPM | DIASTOLIC BLOOD PRESSURE: 79 MMHG | HEIGHT: 72 IN | WEIGHT: 315 LBS

## 2022-02-16 DIAGNOSIS — E10.65 TYPE 1 DIABETES MELLITUS WITH HYPERGLYCEMIA: ICD-10-CM

## 2022-02-16 DIAGNOSIS — E66.01 CLASS 3 SEVERE OBESITY DUE TO EXCESS CALORIES WITH SERIOUS COMORBIDITY AND BODY MASS INDEX (BMI) OF 45.0 TO 49.9 IN ADULT: ICD-10-CM

## 2022-02-16 DIAGNOSIS — I10 ESSENTIAL HYPERTENSION: ICD-10-CM

## 2022-02-16 DIAGNOSIS — E10.65 TYPE 1 DIABETES MELLITUS WITH HYPERGLYCEMIA: Primary | ICD-10-CM

## 2022-02-16 LAB
ALBUMIN/CREAT UR: NORMAL UG/MG (ref 0–30)
CREAT UR-MCNC: 25.6 MG/DL (ref 23–375)
MICROALBUMIN UR DL<=1MG/L-MCNC: <5 UG/ML

## 2022-02-16 PROCEDURE — 3008F BODY MASS INDEX DOCD: CPT | Mod: CPTII,S$GLB,, | Performed by: INTERNAL MEDICINE

## 2022-02-16 PROCEDURE — 1159F PR MEDICATION LIST DOCUMENTED IN MEDICAL RECORD: ICD-10-PCS | Mod: CPTII,S$GLB,, | Performed by: INTERNAL MEDICINE

## 2022-02-16 PROCEDURE — 82043 UR ALBUMIN QUANTITATIVE: CPT | Performed by: INTERNAL MEDICINE

## 2022-02-16 PROCEDURE — 3078F PR MOST RECENT DIASTOLIC BLOOD PRESSURE < 80 MM HG: ICD-10-PCS | Mod: CPTII,S$GLB,, | Performed by: INTERNAL MEDICINE

## 2022-02-16 PROCEDURE — 3078F DIAST BP <80 MM HG: CPT | Mod: CPTII,S$GLB,, | Performed by: INTERNAL MEDICINE

## 2022-02-16 PROCEDURE — 4010F ACE/ARB THERAPY RXD/TAKEN: CPT | Mod: CPTII,S$GLB,, | Performed by: INTERNAL MEDICINE

## 2022-02-16 PROCEDURE — 3051F HG A1C>EQUAL 7.0%<8.0%: CPT | Mod: CPTII,S$GLB,, | Performed by: INTERNAL MEDICINE

## 2022-02-16 PROCEDURE — 3051F PR MOST RECENT HEMOGLOBIN A1C LEVEL 7.0 - < 8.0%: ICD-10-PCS | Mod: CPTII,S$GLB,, | Performed by: INTERNAL MEDICINE

## 2022-02-16 PROCEDURE — 3075F SYST BP GE 130 - 139MM HG: CPT | Mod: CPTII,S$GLB,, | Performed by: INTERNAL MEDICINE

## 2022-02-16 PROCEDURE — 1159F MED LIST DOCD IN RCRD: CPT | Mod: CPTII,S$GLB,, | Performed by: INTERNAL MEDICINE

## 2022-02-16 PROCEDURE — 82570 ASSAY OF URINE CREATININE: CPT | Performed by: INTERNAL MEDICINE

## 2022-02-16 PROCEDURE — 1160F PR REVIEW ALL MEDS BY PRESCRIBER/CLIN PHARMACIST DOCUMENTED: ICD-10-PCS | Mod: CPTII,S$GLB,, | Performed by: INTERNAL MEDICINE

## 2022-02-16 PROCEDURE — 3075F PR MOST RECENT SYSTOLIC BLOOD PRESS GE 130-139MM HG: ICD-10-PCS | Mod: CPTII,S$GLB,, | Performed by: INTERNAL MEDICINE

## 2022-02-16 PROCEDURE — 3008F PR BODY MASS INDEX (BMI) DOCUMENTED: ICD-10-PCS | Mod: CPTII,S$GLB,, | Performed by: INTERNAL MEDICINE

## 2022-02-16 PROCEDURE — 99214 PR OFFICE/OUTPT VISIT, EST, LEVL IV, 30-39 MIN: ICD-10-PCS | Mod: S$GLB,,, | Performed by: INTERNAL MEDICINE

## 2022-02-16 PROCEDURE — 99214 OFFICE O/P EST MOD 30 MIN: CPT | Mod: S$GLB,,, | Performed by: INTERNAL MEDICINE

## 2022-02-16 PROCEDURE — 4010F PR ACE/ARB THEARPY RXD/TAKEN: ICD-10-PCS | Mod: CPTII,S$GLB,, | Performed by: INTERNAL MEDICINE

## 2022-02-16 PROCEDURE — 1160F RVW MEDS BY RX/DR IN RCRD: CPT | Mod: CPTII,S$GLB,, | Performed by: INTERNAL MEDICINE

## 2022-02-16 RX ORDER — INSULIN LISPRO 100 [IU]/ML
INJECTION, SOLUTION INTRAVENOUS; SUBCUTANEOUS
Qty: 120 ML | Refills: 3 | Status: ON HOLD | OUTPATIENT
Start: 2022-02-16 | End: 2022-08-23

## 2022-02-16 RX ORDER — LOSARTAN POTASSIUM 50 MG/1
50 TABLET ORAL DAILY
Qty: 90 TABLET | Refills: 3 | Status: SHIPPED | OUTPATIENT
Start: 2022-02-16

## 2022-02-16 NOTE — ASSESSMENT & PLAN NOTE
Reviewed goals of therapy - to get the best control we can without hypoglycemia. Goal <7, maybe down to 6.5 if possible   - last A1C still above goal   - sugar with wide variability   hasn't been able to use sensor due to lost transmitter. Recommend reaching out to [employer] to see if they can get him a replacement.   for now, gave sample freestyle cassy. Which won't allow auto mode but it will allow additional glucose data.   f/u with DM education in 2-3 weeks to review, try to see if there are useful patterns to adjust settings     discussed with pt about pump upgrade options. 770G vs omnipod 5 vs tslimx2. Pt not sure about how well any of those would be covered. Encourage pt to check with insurance. For now, reasonable to continue same system.    Reviewed patient's current insulin regimen. Clarified proper insulin dose and timing in relation to meals, etc. Insulin injection sites and proper rotation instructed.   -Advised frequent self blood glucose monitoring. Patient encouraged to document glucose results and bring them to every clinic visit  -Close adherence to lifestyle changes recommended.    -Periodic follow ups for eye evaluations, foot care suggested.

## 2022-02-16 NOTE — PROGRESS NOTES
Subjective:      Chief Complaint: Diabetes    HPI: Clay Waters is a 41 y.o. male who is here for a follow-up evaluation for diabetes. Last seen 2021. Cancelled 10/2021 appointment. Did not get labs.    Of note, had covid 2021.  New job as well, insurance change, etc. Pt reports needing to use [facility] and humalog rather than novolog.    With regards to the diabetes:  Diagnosed with T1DM since around 9 years old.     Known complications:    Retinopathy? No, overdue for f/u    Nephropathy? No    Neuropathy? No    CAD? No    CVA? No     Current regimen:   Pump. 670G since 2020, with sensor (except for the last few months, lost transmitter). Changes site every 3 days or so     Settings reviewed:   basal:   12a: 2.0 units/hr   4p: 2.1 units/hr   8p: 2.0 units/hr      Carb 1:5   Sensitivity factor:  40   Target:     Active insulin time: 3 hrs    Prior treatments:   basal/bolus    Self-Monitored blood glucose.  # times a day testinx/day  Log reviewed: No    Per patient report, glucose from .    Pre breakfast: 120-290  Pre lunch: 160-300  Pre dinner: 150-300    Hypoglycemic events? 3-4 times a month. Mostly at work, more active at new job, down to 40s.    Current Symptoms  No   Yes  [x]    []  Polydipsia  [x]    []  Polyuria   []    [x]  Vision changes, after losing glasses  [x]    []  Nausea  [x]    []  Diarrhea  [x]    []  Weight gain   [x]    []  Weight loss     Diabetes Management Status    Statin: Not taking  ACE/ARB: Taking    Screening or Prevention Patient's value Goal Complete/Controlled?   HgA1C Testing and Control   Lab Results   Component Value Date    HGBA1C 7.9 (H) 2021      q6months/Less than 7% No   Lipid profile : 2020 Annually No   LDL control Lab Results   Component Value Date    LDLCALC 78.8 2020    Annually/Less than 100 mg/dl  No   Nephropathy screening Lab Results   Component Value Date    MICALBCREAT 6.3 2020     Lab Results    Component Value Date    CREATININE 1.3 06/23/2021     Lab Results   Component Value Date    PROTEINUA Negative 11/09/2017    Annually Yes   Blood pressure BP Readings from Last 1 Encounters:   12/30/21 131/68    Less than 140/90 Yes   Dilated retinal exam Most Recent Eye Exam Date: Not Found Annually Yes   Foot exam   : 07/10/2019 Annually No     Reviewed past medical, family, social history and updated as appropriate.    Review of Systems  As above    Objective:     Vitals:    02/16/22 1330   BP: 139/79   Pulse: 93     BP Readings from Last 5 Encounters:   12/30/21 131/68   12/29/21 (!) 159/75   09/19/21 (!) 148/72   09/17/21 (!) 161/90   06/23/21 139/63     Physical Exam  Vitals reviewed.   Constitutional:       General: He is not in acute distress.     Appearance: He is obese.   Neck:      Thyroid: No thyromegaly.   Cardiovascular:      Heart sounds: Normal heart sounds.   Pulmonary:      Effort: Pulmonary effort is normal.       Wt Readings from Last 10 Encounters:   02/16/22 1330 (!) 151.7 kg (334 lb 7 oz)   12/30/21 1253 (!) 142.9 kg (315 lb)   12/29/21 1521 (!) 142.9 kg (315 lb)   09/19/21 1648 (!) 145.2 kg (320 lb)   09/17/21 2007 (!) 145.2 kg (320 lb)   06/23/21 1104 (!) 152.6 kg (336 lb 6.8 oz)   04/12/21 2017 (!) 145.2 kg (320 lb)   04/11/21 1048 (!) 147.9 kg (326 lb)   03/17/21 1331 (!) 148.1 kg (326 lb 8 oz)   12/09/20 1323 (!) 145.6 kg (320 lb 15.8 oz)     Lab Results   Component Value Date    HGBA1C 7.9 (H) 06/23/2021     Lab Results   Component Value Date    CHOL 144 01/02/2020    HDL 54 01/02/2020    LDLCALC 78.8 01/02/2020    TRIG 56 01/02/2020    CHOLHDL 37.5 01/02/2020     Lab Results   Component Value Date     06/23/2021    K 4.4 06/23/2021     06/23/2021    CO2 23 06/23/2021     (H) 06/23/2021    BUN 17 06/23/2021    CREATININE 1.3 06/23/2021    CALCIUM 8.9 06/23/2021    PROT 7.2 11/09/2017    ALBUMIN 3.1 (L) 10/03/2018    BILITOT 0.8 11/09/2017    ALKPHOS 103 11/09/2017     AST 14 11/09/2017    ALT 12 11/09/2017    ANIONGAP 9 06/23/2021    ESTGFRAFRICA >60 06/23/2021    EGFRNONAA >60 06/23/2021    TSH 1.244 09/09/2020      Lab Results   Component Value Date    MICALBCREAT 6.3 09/09/2020     Assessment/Plan:     Type 1 diabetes mellitus with hyperglycemia  Reviewed goals of therapy - to get the best control we can without hypoglycemia. Goal <7, maybe down to 6.5 if possible   - last A1C still above goal   - sugar with wide variability   hasn't been able to use sensor due to lost transmitter. Recommend reaching out to [employer] to see if they can get him a replacement.   for now, gave sample freestyle cassy. Which won't allow auto mode but it will allow additional glucose data.   f/u with DM education in 2-3 weeks to review, try to see if there are useful patterns to adjust settings     discussed with pt about pump upgrade options. 770G vs omnipod 5 vs tslimx2. Pt not sure about how well any of those would be covered. Encourage pt to check with insurance. For now, reasonable to continue same system.    Reviewed patient's current insulin regimen. Clarified proper insulin dose and timing in relation to meals, etc. Insulin injection sites and proper rotation instructed.   -Advised frequent self blood glucose monitoring. Patient encouraged to document glucose results and bring them to every clinic visit  -Close adherence to lifestyle changes recommended.    -Periodic follow ups for eye evaluations, foot care suggested.      Essential hypertension  bp controlled today, continue meds, monitor. F/u with PCP      Class 3 severe obesity due to excess calories with serious comorbidity in adult  Body mass index is 45.36 kg/m².   - complicated by diabetes   - had lost some weight, gained it back. Overall fairly stable from last appointment.   encourage pt to work on healthy diet, exercise habits as tolerated   - monitor weight      Labs today.    Follow up in about 3 months (around 5/16/2022) for  lab review, further monitoring.      Jesus Healy MD  Endocrinology

## 2022-02-16 NOTE — ASSESSMENT & PLAN NOTE
Body mass index is 45.36 kg/m².   - complicated by diabetes   - had lost some weight, gained it back. Overall fairly stable from last appointment.   encourage pt to work on healthy diet, exercise habits as tolerated   - monitor weight

## 2022-02-16 NOTE — TELEPHONE ENCOUNTER
Spoke with patient, he stated in July, august of 2021, he was eligible for [employer] upgrade but nothing was done, so he stated he does not need anything .

## 2022-02-16 NOTE — Clinical Note
Hey, this conchis has type 1 diabetes, 670G but lost/broke transmitter. Sugar all over the place, no log to review. Gave a freestyle cassy sample (the last set I got  at the end of Feb), wondering if he'd be able to follow-up with you (or [facility] team, I think he lives a bit closer to [facility]) in 2-4 weeks to review freestyle cassy data to see if he has any useful patterns for dose adjustments. As well as see if his insurance would cover any of the newer pump options (omnipod 5, tslim x2, 770G). Thanks,  - Jesus

## 2022-02-16 NOTE — PATIENT INSTRUCTIONS
For diabetes,    Continue pump.    Check with insurance if they cover omnipod better, can consider omnipod 5 (works with dexcom sensor).   or tslimX2 pump (also integrates with dexcom sensor).

## 2022-02-18 ENCOUNTER — TELEPHONE (OUTPATIENT)
Dept: ADMINISTRATIVE | Facility: OTHER | Age: 42
End: 2022-02-18
Payer: COMMERCIAL

## 2022-02-18 NOTE — TELEPHONE ENCOUNTER
Left voice message for patient to return call to schedule appointment from referral to [facility].  Tri GARCIA [phone]

## 2022-02-23 ENCOUNTER — PATIENT MESSAGE (OUTPATIENT)
Dept: ENDOCRINOLOGY | Facility: CLINIC | Age: 42
End: 2022-02-23
Payer: COMMERCIAL

## 2022-02-23 DIAGNOSIS — E10.65 TYPE 1 DIABETES MELLITUS WITH HYPERGLYCEMIA: Primary | ICD-10-CM

## 2022-02-23 RX ORDER — INSULIN LISPRO 100 [IU]/ML
INJECTION, SOLUTION INTRAVENOUS; SUBCUTANEOUS
Qty: 120 ML | Refills: 3 | Status: SHIPPED | OUTPATIENT
Start: 2022-02-23 | End: 2022-04-06 | Stop reason: SDUPTHER

## 2022-02-23 NOTE — TELEPHONE ENCOUNTER
Labs reviewed.    Recent Results (from the past 168 hour(s))   Basic Metabolic Panel    Collection Time: 02/16/22  2:12 PM   Result Value Ref Range    Sodium 135 (L) 136 - 145 mmol/L    Potassium 4.7 3.5 - 5.1 mmol/L    Chloride 101 95 - 110 mmol/L    CO2 24 23 - 29 mmol/L    Glucose 438 (H) 70 - 110 mg/dL    BUN 19 6 - 20 mg/dL    Creatinine 1.1 0.5 - 1.4 mg/dL    Calcium 9.1 8.7 - 10.5 mg/dL    Anion Gap 10 8 - 16 mmol/L    eGFR if African American >60 >60 mL/min/1.73 m^2    eGFR if non African American >60 >60 mL/min/1.73 m^2   Hemoglobin A1C    Collection Time: 02/16/22  2:12 PM   Result Value Ref Range    Hemoglobin A1C 10.4 (H) 4.0 - 5.6 %    Estimated Avg Glucose 252 (H) 68 - 131 mg/dL   Microalbumin/Creatinine Ratio, Urine    Collection Time: 02/16/22  2:46 PM   Result Value Ref Range    Microalbumin, Urine <5.0 ug/mL    Creatinine, Urine 25.6 23.0 - 375.0 mg/dL    Microalb/Creat Ratio Unable to calculate 0.0 - 30.0 ug/mg       MACR normal.  GFR normal    A1C 10.4  Glucose 438  Na low but with glucose high, corrected sodium normal.    Had very wide range in sugar at latest appointment.   had issues with [employer] sensor.    gave freestyle cassy sample, recommended checking in with DM education in a few weeks to download/review CGM data to help inform dose adjustments.    With glucose 400s after lunch, increase carb ratio. 1:4    Still recommend DM education    Repeat labs next time

## 2022-03-11 ENCOUNTER — TELEPHONE (OUTPATIENT)
Dept: ENDOCRINOLOGY | Facility: CLINIC | Age: 42
End: 2022-03-11
Payer: COMMERCIAL

## 2022-03-21 ENCOUNTER — PATIENT MESSAGE (OUTPATIENT)
Dept: DIABETES | Facility: CLINIC | Age: 42
End: 2022-03-21
Payer: COMMERCIAL

## 2022-04-06 ENCOUNTER — PATIENT MESSAGE (OUTPATIENT)
Dept: ENDOCRINOLOGY | Facility: CLINIC | Age: 42
End: 2022-04-06
Payer: COMMERCIAL

## 2022-04-06 DIAGNOSIS — E10.65 TYPE 1 DIABETES MELLITUS WITH HYPERGLYCEMIA: ICD-10-CM

## 2022-04-06 RX ORDER — INSULIN LISPRO 100 [IU]/ML
INJECTION, SOLUTION INTRAVENOUS; SUBCUTANEOUS
Qty: 120 ML | Refills: 3 | Status: SHIPPED | OUTPATIENT
Start: 2022-04-06 | End: 2022-05-26

## 2022-04-13 ENCOUNTER — PATIENT MESSAGE (OUTPATIENT)
Dept: ENDOCRINOLOGY | Facility: CLINIC | Age: 42
End: 2022-04-13
Payer: COMMERCIAL

## 2022-06-22 ENCOUNTER — TELEPHONE (OUTPATIENT)
Dept: ENDOCRINOLOGY | Facility: CLINIC | Age: 42
End: 2022-06-22
Payer: COMMERCIAL

## 2022-07-12 ENCOUNTER — OFFICE VISIT (OUTPATIENT)
Dept: INTERNAL MEDICINE | Facility: CLINIC | Age: 42
End: 2022-07-12
Attending: FAMILY MEDICINE
Payer: COMMERCIAL

## 2022-07-12 DIAGNOSIS — U07.1 COVID: Primary | ICD-10-CM

## 2022-07-12 DIAGNOSIS — I10 ESSENTIAL HYPERTENSION: ICD-10-CM

## 2022-07-12 DIAGNOSIS — E10.65 TYPE 1 DIABETES MELLITUS WITH HYPERGLYCEMIA: ICD-10-CM

## 2022-07-12 DIAGNOSIS — R05.9 COUGH: ICD-10-CM

## 2022-07-12 PROCEDURE — 4010F PR ACE/ARB THEARPY RXD/TAKEN: ICD-10-PCS | Mod: CPTII,95,, | Performed by: NURSE PRACTITIONER

## 2022-07-12 PROCEDURE — 4010F ACE/ARB THERAPY RXD/TAKEN: CPT | Mod: CPTII,95,, | Performed by: NURSE PRACTITIONER

## 2022-07-12 PROCEDURE — 99214 PR OFFICE/OUTPT VISIT, EST, LEVL IV, 30-39 MIN: ICD-10-PCS | Mod: 95,,, | Performed by: NURSE PRACTITIONER

## 2022-07-12 PROCEDURE — 3046F PR MOST RECENT HEMOGLOBIN A1C LEVEL > 9.0%: ICD-10-PCS | Mod: CPTII,95,, | Performed by: NURSE PRACTITIONER

## 2022-07-12 PROCEDURE — 1159F MED LIST DOCD IN RCRD: CPT | Mod: CPTII,95,, | Performed by: NURSE PRACTITIONER

## 2022-07-12 PROCEDURE — 1159F PR MEDICATION LIST DOCUMENTED IN MEDICAL RECORD: ICD-10-PCS | Mod: CPTII,95,, | Performed by: NURSE PRACTITIONER

## 2022-07-12 PROCEDURE — 1160F RVW MEDS BY RX/DR IN RCRD: CPT | Mod: CPTII,95,, | Performed by: NURSE PRACTITIONER

## 2022-07-12 PROCEDURE — 99214 OFFICE O/P EST MOD 30 MIN: CPT | Mod: 95,,, | Performed by: NURSE PRACTITIONER

## 2022-07-12 PROCEDURE — 3066F PR DOCUMENTATION OF TREATMENT FOR NEPHROPATHY: ICD-10-PCS | Mod: CPTII,95,, | Performed by: NURSE PRACTITIONER

## 2022-07-12 PROCEDURE — 3061F PR NEG MICROALBUMINURIA RESULT DOCUMENTED/REVIEW: ICD-10-PCS | Mod: CPTII,95,, | Performed by: NURSE PRACTITIONER

## 2022-07-12 PROCEDURE — 3061F NEG MICROALBUMINURIA REV: CPT | Mod: CPTII,95,, | Performed by: NURSE PRACTITIONER

## 2022-07-12 PROCEDURE — 3066F NEPHROPATHY DOC TX: CPT | Mod: CPTII,95,, | Performed by: NURSE PRACTITIONER

## 2022-07-12 PROCEDURE — 3046F HEMOGLOBIN A1C LEVEL >9.0%: CPT | Mod: CPTII,95,, | Performed by: NURSE PRACTITIONER

## 2022-07-12 PROCEDURE — 1160F PR REVIEW ALL MEDS BY PRESCRIBER/CLIN PHARMACIST DOCUMENTED: ICD-10-PCS | Mod: CPTII,95,, | Performed by: NURSE PRACTITIONER

## 2022-07-12 RX ORDER — PROMETHAZINE HYDROCHLORIDE AND DEXTROMETHORPHAN HYDROBROMIDE 6.25; 15 MG/5ML; MG/5ML
5 SYRUP ORAL EVERY 4 HOURS PRN
Qty: 140 ML | Refills: 0 | Status: SHIPPED | OUTPATIENT
Start: 2022-07-12 | End: 2022-07-22

## 2022-07-12 NOTE — PROGRESS NOTES
[facility] Note    Chief Complaint    No chief complaint on file.    History of Present Illness    The patient location is: [place]   The chief complaint leading to consultation is: covid    Visit type: audiovisual    Face to Face time with patient: 15  30 minutes of total time spent on the encounter, which includes face to face time and non-face to face time preparing to see the patient (eg, review of tests), Obtaining and/or reviewing separately obtained history, Documenting clinical information in the electronic or other health record, Independently interpreting results (not separately reported) and communicating results to the patient/family/caregiver, or Care coordination (not separately reported).         Each patient to whom he or she provides medical services by telemedicine is:  (1) informed of the relationship between the physician and patient and the respective role of any other health care provider with respect to management of the patient; and (2) notified that he or she may decline to receive medical services by telemedicine and may withdraw from such care at any time.        Clay Waters is a 42 y.o. male patient of Dr. Chandler who is new to me and presents today for c/o covid positive. Tested positive last night, has body aches, coughing, feeling lightheaded. No fever, chills, sob or cp. Is taking dayquil, nyquil.      Health Maintenance   Topic Date Due    Hepatitis C Screening  Never done    Eye Exam  Never done    Low Dose Statin  Never done    Foot Exam  07/10/2020    Lipid Panel  01/02/2021    Hemoglobin A1c  05/16/2022    TETANUS VACCINE  04/28/2029       Past Medical History:   Diagnosis Date    Childhood asthma     Class 3 severe obesity due to excess calories with serious comorbidity in adult 1/1/2020    Essential hypertension 10/1/2018    Influenza B 1/1/2020    Insulin pump in place 10/1/2018    Lung nodule 10/2/2018    <1cm multiple lung nodules in the  setting of staph infection LTNS No history of cancer    Mild intermittent asthma with exacerbation 1/1/2020    Type 1 diabetes mellitus with hyperglycemia 10/1/2018       Past Surgical History:   Procedure Laterality Date    COLONOSCOPY N/A 11/4/2020    Procedure: COLONOSCOPY;  Surgeon: Jesus Claros MD;  [facility]  Service: Endoscopy;  Laterality: N/A;    ESOPHAGOGASTRODUODENOSCOPY N/A 11/4/2020    Procedure: EGD (ESOPHAGOGASTRODUODENOSCOPY);  Surgeon: Jesus Claros MD;  [facility]  Service: Endoscopy;  Laterality: N/A;  2nd floor for wt/neck. has insulin pump     pt requesting a Wed-doesn't want to wait until December for Dr Dias availability       COVID test at [facility] on 11/1-GT    VASECTOMY         family history includes Celiac disease in his daughter and father.     Social History     Tobacco Use    Smoking status: Never Smoker    Smokeless tobacco: Never Used   Substance Use Topics    Alcohol use: Yes     Comment: occas, none recently    Drug use: No       Review of Systems   Constitutional: Negative for chills, fever and weight loss.   HENT: Positive for sore throat. Negative for ear pain.    Respiratory: Positive for cough, shortness of breath and wheezing. Negative for hemoptysis.    Cardiovascular: Negative for chest pain and palpitations.   Gastrointestinal: Negative for diarrhea, heartburn and nausea.   Genitourinary: Negative for dysuria.   Musculoskeletal: Positive for myalgias.   Skin: Negative for rash.   Neurological: Positive for dizziness and headaches.   Endo/Heme/Allergies: Negative for environmental allergies.        Outpatient Encounter Medications as of 7/12/2022   Medication Sig Dispense Refill    HUMALOG U-100 INSULIN 100 unit/mL injection USE WITH INSULIN PUMP. MAX DAILY DOSE 150 UNITS DAILY 120 mL 3    insulin lispro 100 unit/mL injection Use with insulin pump. Max daily dose 150 units/day 120 mL 3    INSULIN PUMP CARTRIDGE  SUBQ Inject into the skin.      losartan (COZAAR) 50 MG tablet Take 1 tablet (50 mg total) by mouth once daily. 90 tablet 3    NOVOLOG U-100 INSULIN ASPART 100 unit/mL injection USE WITH INSULIN PUMP. MAX DAILY DOSE 140 UNITS/ mL 3    promethazine-dextromethorphan (PROMETHAZINE-DM) 6.25-15 mg/5 mL Syrp Take 5 mLs by mouth every 4 (four) hours as needed (cough). 140 mL 0    tadalafiL (CIALIS) 10 MG tablet TAKE 1 TABLET (10 MG TOTAL) BY MOUTH DAILY AS NEEDED FOR ERECTILE DYSFUNCTION. 30 tablet 5     No facility-administered encounter medications on file as of 7/12/2022.       Review of patient's allergies indicates:   Allergen Reactions    Vicodin [hydrocodone-acetaminophen] Nausea And Vomiting       Physical Exam           Physical Exam  Constitutional:       General: He is not in acute distress.     Appearance: Normal appearance. He is ill-appearing.   HENT:      Head: Normocephalic and atraumatic.   Cardiovascular:      Comments: No s/s of distress noted  Pulmonary:      Effort: Pulmonary effort is normal. No respiratory distress.   Skin:     General: Skin is dry.   Neurological:      Mental Status: He is alert and oriented to person, place, and time.   Psychiatric:         Mood and Affect: Mood normal.         Behavior: Behavior normal.         Thought Content: Thought content normal.         Judgment: Judgment normal.          Laboratory:  CBC:  Lab Results   Component Value Date    WBC 14.80 (H) 01/02/2020    RBC 5.09 01/02/2020    HGB 13.8 (L) 01/02/2020    HCT 45 09/19/2021     01/02/2020    MCV 84 01/02/2020    MCH 27.1 01/02/2020    MCHC 32.4 01/02/2020    MCHC 33.3 10/03/2018    MCHC 32.7 09/30/2018     CMP:  Lab Results   Component Value Date     (H) 02/16/2022    CALCIUM 9.1 02/16/2022    ALBUMIN 3.1 (L) 10/03/2018    PROT 7.2 11/09/2017     (L) 02/16/2022    K 4.7 02/16/2022    CO2 24 02/16/2022     02/16/2022    BUN 19 02/16/2022    ALKPHOS 103 11/09/2017    ALT 12  11/09/2017    AST 14 11/09/2017    BILITOT 0.8 11/09/2017    BILITOT 0.7 07/30/2007     URINALYSIS:  Lab Results   Component Value Date    COLORU Yellow 11/09/2017    SPECGRAV 1.030 11/09/2017    PHUR 6.0 11/09/2017    PROTEINUA Negative 11/09/2017    BACTERIA Rare 11/09/2017    NITRITE Negative 11/09/2017    LEUKOCYTESUR Negative 11/09/2017    UROBILINOGEN Negative 11/09/2017    HYALINECASTS 2 (A) 11/09/2017      LIPIDS:  Lab Results   Component Value Date    TSH 1.244 09/09/2020    HDL 54 01/02/2020    CHOL 144 01/02/2020    TRIG 56 01/02/2020    LDLCALC 78.8 01/02/2020    CHOLHDL 37.5 01/02/2020    NONHDLCHOL 90 01/02/2020    TOTALCHOLEST 2.7 01/02/2020     TSH:  Lab Results   Component Value Date    TSH 1.244 09/09/2020     A1C:  Lab Results   Component Value Date    HGBA1C 10.4 (H) 02/16/2022    HGBA1C 7.9 (H) 06/23/2021    HGBA1C 8.4 (H) 03/10/2021    HGBA1C 8.0 (H) 09/09/2020    HGBA1C 10.3 (H) 01/01/2020    HGBA1C 10.4 (H) 09/30/2018         Assessment/Plan     Clay Waters is a 42 y.o.male with:    COVID  -     Ambulatory referral/consult to [facility]; Future; Expected date: 07/12/2022    Essential hypertension  -     Ambulatory referral/consult to [facility]; Future; Expected date: 07/12/2022    Type 1 diabetes mellitus with hyperglycemia  -     Ambulatory referral/consult to [facility]; Future; Expected date: 07/12/2022    Cough  -     promethazine-dextromethorphan (PROMETHAZINE-DM) 6.25-15 mg/5 mL Syrp; Take 5 mLs by mouth every 4 (four) hours as needed (cough).  Dispense: 140 mL; Refill: 0     stay hydrated with fluids  Infusion center will call to schedule appt if pt qualifies  Continue current meds  F/u as needed for any concerns      I spent 30 minutes on the day of this encounter for preparing for, evaluating, treating, and managing this patient.        -Continue current medications and maintain follow up with specialists.  Return to clinic as needed for any concerns   No follow-ups  on file.      EDEN JoyceC  [facility]

## 2022-07-13 ENCOUNTER — TELEPHONE (OUTPATIENT)
Dept: INFECTIOUS DISEASES | Facility: HOSPITAL | Age: 42
End: 2022-07-13
Payer: COMMERCIAL

## 2022-07-13 NOTE — TELEPHONE ENCOUNTER
[facility] antibody infusion scheduled for 07/14/22 @ 0900.  Patient acknowledged.  Contraindicated to paxlovid due to patient not being able to hold on Tadalafil

## 2022-07-14 ENCOUNTER — INFUSION (OUTPATIENT)
Dept: INFECTIOUS DISEASES | Facility: HOSPITAL | Age: 42
End: 2022-07-14
Attending: INTERNAL MEDICINE
Payer: COMMERCIAL

## 2022-07-14 VITALS
TEMPERATURE: 98 F | BODY MASS INDEX: 42.66 KG/M2 | RESPIRATION RATE: 18 BRPM | HEIGHT: 72 IN | OXYGEN SATURATION: 95 % | HEART RATE: 69 BPM | DIASTOLIC BLOOD PRESSURE: 79 MMHG | WEIGHT: 315 LBS | SYSTOLIC BLOOD PRESSURE: 137 MMHG

## 2022-07-14 DIAGNOSIS — E10.65 TYPE 1 DIABETES MELLITUS WITH HYPERGLYCEMIA: ICD-10-CM

## 2022-07-14 DIAGNOSIS — U07.1 COVID: ICD-10-CM

## 2022-07-14 DIAGNOSIS — I10 ESSENTIAL HYPERTENSION: ICD-10-CM

## 2022-07-14 DIAGNOSIS — U07.1 COVID-19: Primary | ICD-10-CM

## 2022-07-14 PROCEDURE — M0222 HC IV INJECTION, BEBTELOVIMAB, INCL POST ADMIN MONIT: HCPCS | Performed by: INTERNAL MEDICINE

## 2022-07-14 PROCEDURE — 63600175 PHARM REV CODE 636 W HCPCS: Performed by: INTERNAL MEDICINE

## 2022-07-14 RX ORDER — ALBUTEROL SULFATE 90 UG/1
2 AEROSOL, METERED RESPIRATORY (INHALATION)
Status: ACTIVE | OUTPATIENT
Start: 2022-07-14 | End: 2022-07-17

## 2022-07-14 RX ORDER — ACETAMINOPHEN 325 MG/1
650 TABLET ORAL
Status: ACTIVE | OUTPATIENT
Start: 2022-07-14 | End: 2022-07-15

## 2022-07-14 RX ORDER — ONDANSETRON 4 MG/1
4 TABLET, ORALLY DISINTEGRATING ORAL
Status: ACTIVE | OUTPATIENT
Start: 2022-07-14 | End: 2022-07-15

## 2022-07-14 RX ORDER — EPINEPHRINE 0.3 MG/.3ML
0.3 INJECTION SUBCUTANEOUS
Status: ACTIVE | OUTPATIENT
Start: 2022-07-14 | End: 2022-07-17

## 2022-07-14 RX ORDER — BEBTELOVIMAB 87.5 MG/ML
175 INJECTION, SOLUTION INTRAVENOUS
Status: COMPLETED | OUTPATIENT
Start: 2022-07-14 | End: 2022-07-14

## 2022-07-14 RX ORDER — DIPHENHYDRAMINE HYDROCHLORIDE 50 MG/ML
25 INJECTION INTRAMUSCULAR; INTRAVENOUS
Status: ACTIVE | OUTPATIENT
Start: 2022-07-14 | End: 2022-07-15

## 2022-07-14 RX ADMIN — BEBTELOVIMAB 175 MG: 87.5 INJECTION, SOLUTION INTRAVENOUS at 09:07

## 2022-07-14 NOTE — PROGRESS NOTES
Patient remains with no signs of complications noted. Patient received Bebtelovimab IV Injection according to FDA recommendations and [facility] S[facility] without complications noted and left with mask in place. Drug fact sheet provided. Pt discharged home. Ambulatory. Remains AAox3. No distress noted. RR even and unlabored.

## 2022-07-14 NOTE — PROGRESS NOTES
0900  Patient arrives for Bebtelovimab IV Injection. Ambulatory. Pt AAox3. No distress noted. RR even and unlabored.     Symptoms and onset date:  7/11/2022; sore throat, cough, fatigue    Tested COVID + on 7/11/2022

## 2022-07-14 NOTE — PROGRESS NOTES
0915  Bebtelovimab IV Injection administered. Pt tolerated injection well. No S/S of injection reaction noted at present time. One hour observation started.

## 2022-08-20 ENCOUNTER — HOSPITAL ENCOUNTER (EMERGENCY)
Facility: HOSPITAL | Age: 42
Discharge: HOME | End: 2022-08-21
Attending: EMERGENCY MEDICINE
Payer: COMMERCIAL

## 2022-08-20 DIAGNOSIS — L03.116 CELLULITIS OF LEFT LOWER EXTREMITY: ICD-10-CM

## 2022-08-20 DIAGNOSIS — L02.91 ABSCESS: Primary | ICD-10-CM

## 2022-08-20 PROCEDURE — 99284 EMERGENCY DEPT VISIT MOD MDM: CPT | Mod: 25

## 2022-08-20 PROCEDURE — 10060 I&D ABSCESS SIMPLE/SINGLE: CPT | Mod: ,,, | Performed by: EMERGENCY MEDICINE

## 2022-08-20 PROCEDURE — 10060 I&D ABSCESS SIMPLE/SINGLE: CPT | Mod: LT

## 2022-08-20 PROCEDURE — 99284 PR EMERGENCY DEPT VISIT,LEVEL IV: ICD-10-PCS | Mod: 25,,, | Performed by: EMERGENCY MEDICINE

## 2022-08-20 PROCEDURE — 99284 EMERGENCY DEPT VISIT MOD MDM: CPT | Mod: 25,,, | Performed by: EMERGENCY MEDICINE

## 2022-08-20 PROCEDURE — 10060 PR DRAIN SKIN ABSCESS SIMPLE: ICD-10-PCS | Mod: ,,, | Performed by: EMERGENCY MEDICINE

## 2022-08-21 VITALS
OXYGEN SATURATION: 98 % | HEART RATE: 97 BPM | DIASTOLIC BLOOD PRESSURE: 80 MMHG | SYSTOLIC BLOOD PRESSURE: 143 MMHG | RESPIRATION RATE: 20 BRPM | WEIGHT: 315 LBS | TEMPERATURE: 99 F | BODY MASS INDEX: 42.86 KG/M2

## 2022-08-21 PROCEDURE — 25000003 PHARM REV CODE 250: Performed by: PHYSICIAN ASSISTANT

## 2022-08-21 PROCEDURE — 10060 I&D ABSCESS SIMPLE/SINGLE: CPT

## 2022-08-21 RX ORDER — IBUPROFEN 400 MG/1
400 TABLET ORAL
Status: COMPLETED | OUTPATIENT
Start: 2022-08-21 | End: 2022-08-21

## 2022-08-21 RX ORDER — ACETAMINOPHEN 325 MG/1
650 TABLET ORAL
Status: COMPLETED | OUTPATIENT
Start: 2022-08-21 | End: 2022-08-21

## 2022-08-21 RX ORDER — LIDOCAINE HYDROCHLORIDE 10 MG/ML
10 INJECTION INFILTRATION; PERINEURAL
Status: COMPLETED | OUTPATIENT
Start: 2022-08-21 | End: 2022-08-21

## 2022-08-21 RX ORDER — SULFAMETHOXAZOLE AND TRIMETHOPRIM 800; 160 MG/1; MG/1
1 TABLET ORAL
Status: COMPLETED | OUTPATIENT
Start: 2022-08-21 | End: 2022-08-21

## 2022-08-21 RX ORDER — SULFAMETHOXAZOLE AND TRIMETHOPRIM 800; 160 MG/1; MG/1
1 TABLET ORAL 2 TIMES DAILY
Qty: 14 TABLET | Refills: 0 | Status: ON HOLD | OUTPATIENT
Start: 2022-08-21 | End: 2022-08-25 | Stop reason: HOSPADM

## 2022-08-21 RX ADMIN — ACETAMINOPHEN 650 MG: 325 TABLET ORAL at 12:08

## 2022-08-21 RX ADMIN — SULFAMETHOXAZOLE AND TRIMETHOPRIM 1 TABLET: 800; 160 TABLET ORAL at 12:08

## 2022-08-21 RX ADMIN — LIDOCAINE HYDROCHLORIDE 10 ML: 10 INJECTION, SOLUTION INFILTRATION; PERINEURAL at 12:08

## 2022-08-21 RX ADMIN — IBUPROFEN 400 MG: 400 TABLET, FILM COATED ORAL at 12:08

## 2022-08-21 NOTE — DISCHARGE INSTRUCTIONS
You have a skin infection and an abscess to your left upper thigh.  We have performed an incision and drainage to the abscess.  Please take Bactrim as prescribed into completion.  Follow-up closely with your primary doctor or return to the emergency department sooner for any new or worsening symptoms.

## 2022-08-21 NOTE — ED PROVIDER NOTES
Encounter Date: 8/20/2022       History     Chief Complaint   Patient presents with    Abscess     Pt c/o L inner thigh abscess x6 days. +fever. -drainage.      42-year-old male with past medical history of asthma, obesity, hypertension, type 1 diabetes who presents to the emergency department with chief complaint of abscess to the left inner thigh x 6 days.  Endorses fever of 100.5.  Denies drainage to the area.  Denies testicular or perennial involvement.  No medication prior to arrival.  Denies other worsening or alleviating factors.        Review of patient's allergies indicates:   Allergen Reactions    Vicodin [hydrocodone-acetaminophen] Nausea And Vomiting     Past Medical History:   Diagnosis Date    Childhood asthma     Class 3 severe obesity due to excess calories with serious comorbidity in adult 1/1/2020    Essential hypertension 10/1/2018    Influenza B 1/1/2020    Insulin pump in place 10/1/2018    Lung nodule 10/2/2018    <1cm multiple lung nodules in the setting of staph infection LTNS No history of cancer    Mild intermittent asthma with exacerbation 1/1/2020    Type 1 diabetes mellitus with hyperglycemia 10/1/2018     Past Surgical History:   Procedure Laterality Date    COLONOSCOPY N/A 11/4/2020    Procedure: COLONOSCOPY;  Surgeon: Jesus Claros MD;  Location: [facility] ([address] [place] [address]);  Service: Endoscopy;  Laterality: N/A;    ESOPHAGOGASTRODUODENOSCOPY N/A 11/4/2020    Procedure: EGD (ESOPHAGOGASTRODUODENOSCOPY);  Surgeon: Jesus Claros MD;  Location: [facility] ([address] [place] [address]);  Service: Endoscopy;  Laterality: N/A;  2nd floor for wt/neck. has insulin pump     pt requesting a Wed-doesn't want to wait until December for Dr Dias availability       COVID test at [facility] on 11/1-GT    VASECTOMY       Family History   Problem Relation Age of Onset    Celiac disease Father     Celiac disease Daughter      Social History     Tobacco Use    Smoking status: Never Smoker    Smokeless  tobacco: Never Used   Substance Use Topics    Alcohol use: Yes     Comment: occas, none recently    Drug use: No     Review of Systems   Constitutional: Negative for fever.   HENT: Negative for sore throat.    Respiratory: Negative for shortness of breath.    Cardiovascular: Negative for chest pain.   Gastrointestinal: Negative for nausea.   Genitourinary: Negative for dysuria.   Musculoskeletal: Negative for back pain.   Skin: Positive for color change and wound. Negative for rash.   Neurological: Negative for weakness.   Hematological: Does not bruise/bleed easily.       Physical Exam     Initial Vitals [08/20/22 2249]   BP Pulse Resp Temp SpO2   (!) 172/79 109 16 99.7 °F (37.6 °C) 100 %      MAP       --         Physical Exam    Nursing note and vitals reviewed.  Constitutional: He appears well-developed and well-nourished. He is not diaphoretic. No distress.   HENT:   Head: Normocephalic and atraumatic.   Mouth/Throat: Oropharynx is clear and moist.   Eyes: EOM are normal. Pupils are equal, round, and reactive to light.   Neck: Neck supple.   Normal range of motion.  Cardiovascular: Normal rate.   Pulmonary/Chest: No respiratory distress.   Abdominal: He exhibits no distension. There is no abdominal tenderness.   Musculoskeletal:         General: Normal range of motion.      Cervical back: Normal range of motion and neck supple.     Neurological: He is alert and oriented to person, place, and time. He has normal strength. GCS score is 15. GCS eye subscore is 4. GCS verbal subscore is 5. GCS motor subscore is 6.   Skin: Skin is warm and dry. Capillary refill takes less than 2 seconds.   Area of erythema, induration, and fluctuance to the left upper thigh, just inferior to the left groin.  There is no testicular or perennial involvement.  There is no crepitus.   Psychiatric: He has a normal mood and affect. His behavior is normal. Judgment and thought content normal.         ED Course   I & D - Incision and  Drainage    Date/Time: 8/21/2022 12:37 AM  Location procedure was performed: [facility] EMERGENCY DEPARTMENT  Performed by: Paula Marley PA-C  Authorized by: Sherrell Leach MD   Consent Done: Yes  Type: abscess  Body area: lower extremity  Anesthesia: local infiltration    Anesthesia:  Local Anesthetic: lidocaine 1% without epinephrine  Scalpel size: 11  Incision type: single straight  Complexity: simple  Drainage: pus and  purulent  Drainage amount: scant  Wound treatment: incision,  drainage and  expression of material  Patient tolerance: Patient tolerated the procedure well with no immediate complications        Labs Reviewed - No data to display       Imaging Results    None          Medications   LIDOcaine HCL 10 mg/ml (1%) injection 10 mL (10 mLs Infiltration Given 8/21/22 0018)   acetaminophen tablet 650 mg (650 mg Oral Given 8/21/22 0018)   ibuprofen tablet 400 mg (400 mg Oral Given 8/21/22 0015)   sulfamethoxazole-trimethoprim 800-160mg per tablet 1 tablet (1 tablet Oral Given 8/21/22 0046)     Medical Decision Making:   History:   Old Medical Records: I decided to obtain old medical records.  Initial Assessment:   Emergent evaluation of a 42-year-old male who presents to the emergency department with chief complaint of pain and swelling to the left upper thigh, fever that began 6 days ago.  Patient is afebrile, hemodynamically stable, nontoxic appearing.  Will order analgesia.  Differential Diagnosis:   Differential diagnosis includes but is not limited to cellulitis, abscess, Vitor's gangrene, seroma.  ED Management:  Bedside ultrasound performed by myself and supervising physician.  There is cobblestoning with concern for a fluid collection.  I performed an incision and drainage, with scant amount of purulence expressed.  There is no testicular or perennial involvement currently.  I have considered but do not suspect Vitor's gangrene based on clinical presentation.  Will start patient on  Bactrim.  He is instructed to monitor the area very closely.  Extensive return precautions discussed.  Patient voices understanding.  All questions answered.  The patient was instructed to follow up with a primary care provider or to return to the emergency department for worsening symptoms. The treatment plan was discussed with the patient who demonstrated understanding and comfort with plan. The patient's history, physical exam, and plan of care was discussed with and agreed upon with my supervising physician.               Attending Attestation:     Physician Attestation Statement for NP/PA:   I have conducted a face to face encounter with this patient in addition to the NP/PA, due to Medical Complexity    Other NP/PA Attestation Additions:      Medical Decision Making: Large are of erythematous, TTP induration over left medial thigh with fluctuant streaks. Bedside U/S showing small abscess tracking laterally, no deeper tracking. Non-toxic appearing, no TTP near perineum or scrotum/penis concerning for Vitor's. Will I&D and start on PO abx, however pt given strict return precautions.     I discussed the patient's care with Advanced Practice Clinician, and did see this patient with the APC.  I reviewed their note and agree with the history, physical, assessment, diagnosis, treatment, and disposition plan provided by the Advanced Practice Clinician.                       Clinical Impression:   Final diagnoses:  [L02.91] Abscess (Primary)  [L03.116] Cellulitis of left lower extremity          ED Disposition Condition    Discharge Stable        ED Prescriptions     Medication Sig Dispense Start Date End Date Auth. Provider    sulfamethoxazole-trimethoprim 800-160mg (BACTRIM DS) 800-160 mg Tab Take 1 tablet by mouth 2 (two) times daily. for 7 days 14 tablet 8/21/2022 8/28/2022 Paula Marley PA-C        Follow-up Information     Follow up With Specialties Details Why Contact Info    [facility] - Emergency Dept  Emergency Medicine Go to  If symptoms worsen [address]  [facility] [address] [phone]    Jesus Healy MD Endocrinology Schedule an appointment as soon as possible for a visit in 3 days For wound re-check [address] [facility] [address]  [facility] [address]  [phone]             Paula Marley PA-C  08/21/22 1421       Sherrell Leach MD  08/22/22 1622

## 2022-08-22 ENCOUNTER — HOSPITAL ENCOUNTER (INPATIENT)
Facility: HOSPITAL | Age: 42
LOS: 1 days | Discharge: HOME | DRG: 603 | End: 2022-08-25
Attending: EMERGENCY MEDICINE | Admitting: STUDENT IN AN ORGANIZED HEALTH CARE EDUCATION/TRAINING PROGRAM
Payer: COMMERCIAL

## 2022-08-22 DIAGNOSIS — R07.9 CHEST PAIN: ICD-10-CM

## 2022-08-22 DIAGNOSIS — L02.416 ABSCESS OF LEFT THIGH: Primary | ICD-10-CM

## 2022-08-22 DIAGNOSIS — E10.65 TYPE 1 DIABETES MELLITUS WITH HYPERGLYCEMIA: ICD-10-CM

## 2022-08-22 DIAGNOSIS — L08.9 SOFT TISSUE INFECTION: ICD-10-CM

## 2022-08-22 LAB
ALBUMIN SERPL BCP-MCNC: 3.3 G/DL (ref 3.5–5.2)
ALP SERPL-CCNC: 105 U/L (ref 55–135)
ALT SERPL W/O P-5'-P-CCNC: 14 U/L (ref 10–44)
ANION GAP SERPL CALC-SCNC: 15 MMOL/L (ref 8–16)
AST SERPL-CCNC: 14 U/L (ref 10–40)
BACTERIA #/AREA URNS HPF: NORMAL /HPF
BASOPHILS # BLD AUTO: 0.07 K/UL (ref 0–0.2)
BASOPHILS NFR BLD: 0.5 % (ref 0–1.9)
BILIRUB SERPL-MCNC: 0.7 MG/DL (ref 0.1–1)
BILIRUB UR QL STRIP: NEGATIVE
BUN SERPL-MCNC: 18 MG/DL (ref 6–20)
CALCIUM SERPL-MCNC: 9.1 MG/DL (ref 8.7–10.5)
CHLORIDE SERPL-SCNC: 98 MMOL/L (ref 95–110)
CLARITY UR: CLEAR
CO2 SERPL-SCNC: 19 MMOL/L (ref 23–29)
COLOR UR: YELLOW
CREAT SERPL-MCNC: 1.4 MG/DL (ref 0.5–1.4)
CTP QC/QA: YES
DIFFERENTIAL METHOD: ABNORMAL
EOSINOPHIL # BLD AUTO: 0 K/UL (ref 0–0.5)
EOSINOPHIL NFR BLD: 0.2 % (ref 0–8)
ERYTHROCYTE [DISTWIDTH] IN BLOOD BY AUTOMATED COUNT: 13.2 % (ref 11.5–14.5)
EST. GFR  (NO RACE VARIABLE): >60 ML/MIN/1.73 M^2
GLUCOSE SERPL-MCNC: 296 MG/DL (ref 70–110)
GLUCOSE UR QL STRIP: ABNORMAL
HCT VFR BLD AUTO: 38.4 % (ref 40–54)
HGB BLD-MCNC: 13.1 G/DL (ref 14–18)
HGB UR QL STRIP: NEGATIVE
IMM GRANULOCYTES # BLD AUTO: 0.06 K/UL (ref 0–0.04)
IMM GRANULOCYTES NFR BLD AUTO: 0.4 % (ref 0–0.5)
KETONES UR QL STRIP: ABNORMAL
LACTATE SERPL-SCNC: 1 MMOL/L (ref 0.5–2.2)
LEUKOCYTE ESTERASE UR QL STRIP: NEGATIVE
LYMPHOCYTES # BLD AUTO: 0.7 K/UL (ref 1–4.8)
LYMPHOCYTES NFR BLD: 5.2 % (ref 18–48)
MCH RBC QN AUTO: 26.8 PG (ref 27–31)
MCHC RBC AUTO-ENTMCNC: 34.1 G/DL (ref 32–36)
MCV RBC AUTO: 79 FL (ref 82–98)
MICROSCOPIC COMMENT: NORMAL
MONOCYTES # BLD AUTO: 1.6 K/UL (ref 0.3–1)
MONOCYTES NFR BLD: 11 % (ref 4–15)
NEUTROPHILS # BLD AUTO: 11.7 K/UL (ref 1.8–7.7)
NEUTROPHILS NFR BLD: 82.7 % (ref 38–73)
NITRITE UR QL STRIP: NEGATIVE
NRBC BLD-RTO: 0 /100 WBC
PH UR STRIP: 6 [PH] (ref 5–8)
PLATELET # BLD AUTO: 278 K/UL (ref 150–450)
PMV BLD AUTO: 9.6 FL (ref 9.2–12.9)
POCT GLUCOSE: 280 MG/DL (ref 70–110)
POCT GLUCOSE: 303 MG/DL (ref 70–110)
POTASSIUM SERPL-SCNC: 3.9 MMOL/L (ref 3.5–5.1)
PROT SERPL-MCNC: 7.1 G/DL (ref 6–8.4)
PROT UR QL STRIP: ABNORMAL
RBC # BLD AUTO: 4.89 M/UL (ref 4.6–6.2)
RBC #/AREA URNS HPF: 0 /HPF (ref 0–4)
SARS-COV-2 RDRP RESP QL NAA+PROBE: NEGATIVE
SODIUM SERPL-SCNC: 132 MMOL/L (ref 136–145)
SP GR UR STRIP: 1.02 (ref 1–1.03)
URN SPEC COLLECT METH UR: ABNORMAL
UROBILINOGEN UR STRIP-ACNC: NEGATIVE EU/DL
WBC # BLD AUTO: 14.12 K/UL (ref 3.9–12.7)
WBC #/AREA URNS HPF: 1 /HPF (ref 0–5)
YEAST URNS QL MICRO: NORMAL

## 2022-08-22 PROCEDURE — U0002 COVID-19 LAB TEST NON-CDC: HCPCS | Performed by: EMERGENCY MEDICINE

## 2022-08-22 PROCEDURE — 83605 ASSAY OF LACTIC ACID: CPT | Performed by: EMERGENCY MEDICINE

## 2022-08-22 PROCEDURE — 63600175 PHARM REV CODE 636 W HCPCS: Performed by: EMERGENCY MEDICINE

## 2022-08-22 PROCEDURE — 80053 COMPREHEN METABOLIC PANEL: CPT | Performed by: EMERGENCY MEDICINE

## 2022-08-22 PROCEDURE — 83036 HEMOGLOBIN GLYCOSYLATED A1C: CPT | Performed by: EMERGENCY MEDICINE

## 2022-08-22 PROCEDURE — 25500020 PHARM REV CODE 255: Performed by: EMERGENCY MEDICINE

## 2022-08-22 PROCEDURE — 96365 THER/PROPH/DIAG IV INF INIT: CPT

## 2022-08-22 PROCEDURE — 99285 EMERGENCY DEPT VISIT HI MDM: CPT | Mod: 25

## 2022-08-22 PROCEDURE — 25000003 PHARM REV CODE 250: Performed by: EMERGENCY MEDICINE

## 2022-08-22 PROCEDURE — 87040 BLOOD CULTURE FOR BACTERIA: CPT | Performed by: EMERGENCY MEDICINE

## 2022-08-22 PROCEDURE — 96375 TX/PRO/DX INJ NEW DRUG ADDON: CPT

## 2022-08-22 PROCEDURE — G0378 HOSPITAL OBSERVATION PER HR: HCPCS

## 2022-08-22 PROCEDURE — 82962 GLUCOSE BLOOD TEST: CPT

## 2022-08-22 PROCEDURE — 81000 URINALYSIS NONAUTO W/SCOPE: CPT | Performed by: EMERGENCY MEDICINE

## 2022-08-22 PROCEDURE — 85025 COMPLETE CBC W/AUTO DIFF WBC: CPT | Performed by: EMERGENCY MEDICINE

## 2022-08-22 RX ORDER — MORPHINE SULFATE 4 MG/ML
4 INJECTION, SOLUTION INTRAMUSCULAR; INTRAVENOUS EVERY 4 HOURS PRN
Status: DISCONTINUED | OUTPATIENT
Start: 2022-08-22 | End: 2022-08-25 | Stop reason: HOSPADM

## 2022-08-22 RX ORDER — MORPHINE SULFATE 4 MG/ML
4 INJECTION, SOLUTION INTRAMUSCULAR; INTRAVENOUS
Status: COMPLETED | OUTPATIENT
Start: 2022-08-22 | End: 2022-08-22

## 2022-08-22 RX ORDER — OXYCODONE HYDROCHLORIDE 5 MG/1
5 TABLET ORAL EVERY 4 HOURS PRN
Status: DISCONTINUED | OUTPATIENT
Start: 2022-08-22 | End: 2022-08-25 | Stop reason: HOSPADM

## 2022-08-22 RX ORDER — TALC
6 POWDER (GRAM) TOPICAL NIGHTLY PRN
Status: DISCONTINUED | OUTPATIENT
Start: 2022-08-22 | End: 2022-08-25 | Stop reason: HOSPADM

## 2022-08-22 RX ORDER — IBUPROFEN 200 MG
24 TABLET ORAL
Status: DISCONTINUED | OUTPATIENT
Start: 2022-08-22 | End: 2022-08-23

## 2022-08-22 RX ORDER — ONDANSETRON 8 MG/1
8 TABLET, ORALLY DISINTEGRATING ORAL EVERY 8 HOURS PRN
Status: DISCONTINUED | OUTPATIENT
Start: 2022-08-22 | End: 2022-08-25 | Stop reason: HOSPADM

## 2022-08-22 RX ORDER — LOSARTAN POTASSIUM 50 MG/1
50 TABLET ORAL DAILY
Status: DISCONTINUED | OUTPATIENT
Start: 2022-08-23 | End: 2022-08-25 | Stop reason: HOSPADM

## 2022-08-22 RX ORDER — SODIUM CHLORIDE, SODIUM LACTATE, POTASSIUM CHLORIDE, CALCIUM CHLORIDE 600; 310; 30; 20 MG/100ML; MG/100ML; MG/100ML; MG/100ML
INJECTION, SOLUTION INTRAVENOUS CONTINUOUS
Status: DISCONTINUED | OUTPATIENT
Start: 2022-08-22 | End: 2022-08-24

## 2022-08-22 RX ORDER — IBUPROFEN 200 MG
16 TABLET ORAL
Status: DISCONTINUED | OUTPATIENT
Start: 2022-08-22 | End: 2022-08-23

## 2022-08-22 RX ORDER — ONDANSETRON 2 MG/ML
4 INJECTION INTRAMUSCULAR; INTRAVENOUS
Status: COMPLETED | OUTPATIENT
Start: 2022-08-22 | End: 2022-08-22

## 2022-08-22 RX ORDER — ACETAMINOPHEN 325 MG/1
650 TABLET ORAL
Status: COMPLETED | OUTPATIENT
Start: 2022-08-22 | End: 2022-08-22

## 2022-08-22 RX ORDER — INSULIN ASPART 100 [IU]/ML
1-10 INJECTION, SOLUTION INTRAVENOUS; SUBCUTANEOUS
Status: DISCONTINUED | OUTPATIENT
Start: 2022-08-22 | End: 2022-08-23

## 2022-08-22 RX ORDER — GLUCAGON 1 MG
1 KIT INJECTION
Status: DISCONTINUED | OUTPATIENT
Start: 2022-08-22 | End: 2022-08-23

## 2022-08-22 RX ORDER — CLINDAMYCIN PHOSPHATE 900 MG/50ML
900 INJECTION, SOLUTION INTRAVENOUS
Status: COMPLETED | OUTPATIENT
Start: 2022-08-22 | End: 2022-08-22

## 2022-08-22 RX ORDER — ACETAMINOPHEN 325 MG/1
650 TABLET ORAL EVERY 8 HOURS PRN
Status: DISCONTINUED | OUTPATIENT
Start: 2022-08-22 | End: 2022-08-25

## 2022-08-22 RX ORDER — PROMETHAZINE HYDROCHLORIDE 25 MG/1
25 TABLET ORAL EVERY 6 HOURS PRN
Status: DISCONTINUED | OUTPATIENT
Start: 2022-08-22 | End: 2022-08-25 | Stop reason: HOSPADM

## 2022-08-22 RX ORDER — CLINDAMYCIN PHOSPHATE 900 MG/50ML
900 INJECTION, SOLUTION INTRAVENOUS
Status: DISCONTINUED | OUTPATIENT
Start: 2022-08-22 | End: 2022-08-23

## 2022-08-22 RX ADMIN — IOHEXOL 100 ML: 350 INJECTION, SOLUTION INTRAVENOUS at 08:08

## 2022-08-22 RX ADMIN — ACETAMINOPHEN 650 MG: 325 TABLET ORAL at 05:08

## 2022-08-22 RX ADMIN — CLINDAMYCIN IN 5 PERCENT DEXTROSE 900 MG: 18 INJECTION, SOLUTION INTRAVENOUS at 05:08

## 2022-08-22 RX ADMIN — MORPHINE SULFATE 4 MG: 4 INJECTION INTRAVENOUS at 05:08

## 2022-08-22 RX ADMIN — SODIUM CHLORIDE, SODIUM LACTATE, POTASSIUM CHLORIDE, AND CALCIUM CHLORIDE 1000 ML: .6; .31; .03; .02 INJECTION, SOLUTION INTRAVENOUS at 05:08

## 2022-08-22 RX ADMIN — ONDANSETRON 4 MG: 2 INJECTION INTRAMUSCULAR; INTRAVENOUS at 05:08

## 2022-08-22 NOTE — ED PROVIDER NOTES
Encounter Date: 8/22/2022       History     Chief Complaint   Patient presents with    Fever     Pt was seen at [place] [facility] for abscess on Saturday night/Sunday morning. Pt had I&D and started on antibiotics. Pt has continued with drainage and pain. Pt started with fever and vomiting today.      The patient is a 42-year-old who has a history childhood-onset asthma, severe obesity, central hypertension, and type I diabetes mellitus and presents with worsening pain from a left proximal inner thigh lesion that he first noticed six days ago.  The area had sudden increase in size and severity of pain two days ago.  He was evaluated in the [facility] ED and had incision and drainage of an abscess performed.  He was prescribed Bactrim DS at discharge which he has been taking.  Despite this antibiotic, he continues to have severe pain as well as fever.  He also has nausea and vomiting that began today.    The history is provided by the patient and medical records. No  was used.     Review of patient's allergies indicates:   Allergen Reactions    Vicodin [hydrocodone-acetaminophen] Nausea And Vomiting     Past Medical History:   Diagnosis Date    Childhood asthma     Class 3 severe obesity due to excess calories with serious comorbidity in adult 1/1/2020    Essential hypertension 10/1/2018    Influenza B 1/1/2020    Insulin pump in place 10/1/2018    Lung nodule 10/2/2018    <1cm multiple lung nodules in the setting of staph infection LTNS No history of cancer    Mild intermittent asthma with exacerbation 1/1/2020    Type 1 diabetes mellitus with hyperglycemia 10/1/2018     Past Surgical History:   Procedure Laterality Date    COLONOSCOPY N/A 11/4/2020    Procedure: COLONOSCOPY;  Surgeon: Jesus Claros MD;  Location: [address];  Service: Endoscopy;  Laterality: N/A;    ESOPHAGOGASTRODUODENOSCOPY N/A 11/4/2020    Procedure: EGD (ESOPHAGOGASTRODUODENOSCOPY);  Surgeon:  Jesus Claros MD;  Location: [facility] (2ND FLR);  Service: Endoscopy;  Laterality: N/A;  2nd floor for wt/neck. has insulin pump     pt requesting a Wed-doesn't want to wait until December for Dr Larissa morales       COVID test at [facility] on 11/1-GT    VASECTOMY       Family History   Problem Relation Age of Onset    Celiac disease Father     Celiac disease Daughter      Social History     Tobacco Use    Smoking status: Never Smoker    Smokeless tobacco: Never Used   Substance Use Topics    Alcohol use: Yes     Comment: occas, none recently    Drug use: No     Review of Systems   Constitutional: Positive for fatigue and fever.   Eyes: Negative for visual disturbance.   Respiratory: Negative for shortness of breath.    Cardiovascular: Negative for chest pain.   Gastrointestinal: Positive for nausea and vomiting. Negative for abdominal pain.   Genitourinary: Negative for dysuria.   Musculoskeletal: Negative for arthralgias and myalgias.   Skin: Positive for wound.   Allergic/Immunologic: Negative for immunocompromised state.   Neurological: Negative for headaches.       Physical Exam     Initial Vitals [08/22/22 1556]   BP Pulse Resp Temp SpO2   135/79 96 18 (!) 101 °F (38.3 °C) 99 %      MAP       --         Physical Exam    Nursing note and vitals reviewed.  Constitutional: He is not diaphoretic. No distress.   HENT:   Head: Normocephalic and atraumatic.   Eyes: Conjunctivae are normal. No scleral icterus.   Cardiovascular: Regular rhythm. Tachycardia present.    No murmur heard.  Pulmonary/Chest: No stridor. No respiratory distress. He has no decreased breath sounds. He has no wheezes. He has no rhonchi. He has no rales.   Abdominal: Abdomen is soft. There is no abdominal tenderness.   Musculoskeletal:        Legs:      Neurological: He is alert and oriented to person, place, and time. GCS score is 15. GCS eye subscore is 4. GCS verbal subscore is 5. GCS motor subscore is 6.   Skin: Skin is warm  and dry. No pallor.   Psychiatric: He has a normal mood and affect. His speech is normal and behavior is normal. He is not actively hallucinating. He is attentive.         ED Course   Procedures  Labs Reviewed   CBC W/ AUTO DIFFERENTIAL - Abnormal; Notable for the following components:       Result Value    WBC 14.12 (*)     Hemoglobin 13.1 (*)     Hematocrit 38.4 (*)     MCV 79 (*)     MCH 26.8 (*)     Gran # (ANC) 11.7 (*)     Immature Grans (Abs) 0.06 (*)     Lymph # 0.7 (*)     Mono # 1.6 (*)     Gran % 82.7 (*)     Lymph % 5.2 (*)     All other components within normal limits   COMPREHENSIVE METABOLIC PANEL - Abnormal; Notable for the following components:    Sodium 132 (*)     CO2 19 (*)     Glucose 296 (*)     Albumin 3.3 (*)     All other components within normal limits   URINALYSIS, REFLEX TO URINE CULTURE - Abnormal; Notable for the following components:    Protein, UA Trace (*)     Glucose, UA 4+ (*)     Ketones, UA 1+ (*)     All other components within normal limits    Narrative:     Specimen Source->Urine   POCT GLUCOSE - Abnormal; Notable for the following components:    POCT Glucose 303 (*)     All other components within normal limits   POCT GLUCOSE - Abnormal; Notable for the following components:    POCT Glucose 280 (*)     All other components within normal limits   CULTURE, BLOOD   CULTURE, BLOOD   URINALYSIS MICROSCOPIC    Narrative:     Specimen Source->Urine   LACTIC ACID, PLASMA   HEMOGLOBIN A1C   HEMOGLOBIN A1C   SARS-COV-2 RDRP GENE          Imaging Results          CT Thigh With Contrast Left (Final result)  Result time 08/22/22 20:22:39    Final result by Kaz Moore MD (08/22/22 20:22:39)                 Impression:      1. Soft tissue induration and small fluid collection in the region of the medial thigh adjacent to the scrotum.  No definitive rim enhancement of the collection however developing abscess is a consideration.  There is induration of the adjacent fat and skin  thickening concerning for infection.  Correlation is advised.  2. Urinary bladder wall thickening, correlation with urinalysis recommended to exclude changes of cystitis.  3. Additional findings above.      Electronically signed by: Kaz Moore MD  Date:    08/22/2022  Time:    20:22             Narrative:    EXAMINATION:  CT THIGH WITH CONTRAST LEFT    CLINICAL HISTORY:  Soft tissue infection suspected, thigh, xray done;    TECHNIQUE:  Axial images of the left thigh were obtained at 3.75 mm intervals following the administration of 100 cc omni 350 IV contrast.  Coronal and sagittal reformatted images were reviewed.    COMPARISON:  None    FINDINGS:  The urinary bladder is nondistended noting there may be residual wall thickening, correlation with urinalysis recommended to exclude changes of cystitis.    There is induration within the medial aspect of the upper thigh adjacent to the base of the scrotum.  There is a small fluid collection in the region without rim enhancement, measuring approximately 4.0 x 1.4 cm.  There is associated medial thigh skin thickening.  Mild induration is noted of the medial aspect of the thigh within the subcutaneous fat to the level of the mid thigh.  No intramuscular or intermuscular involvement.  No acute displaced fracture or dislocation of the femur.  No radiopaque foreign body.                                 Medications   clindamycin in D5W 900 mg/50 mL IVPB 900 mg (has no administration in time range)   lactated ringers infusion (has no administration in time range)   losartan tablet 50 mg (has no administration in time range)   glucose chewable tablet 16 g (has no administration in time range)   glucose chewable tablet 24 g (has no administration in time range)   glucagon (human recombinant) injection 1 mg (has no administration in time range)   dextrose 10% bolus 125 mL (has no administration in time range)   dextrose 10% bolus 250 mL (has no administration in time range)    insulin aspart U-100 pen 1-10 Units (has no administration in time range)   ondansetron disintegrating tablet 8 mg (has no administration in time range)   promethazine tablet 25 mg (has no administration in time range)   melatonin tablet 6 mg (has no administration in time range)   acetaminophen tablet 650 mg (has no administration in time range)   morphine injection 4 mg (has no administration in time range)   oxyCODONE immediate release tablet 5 mg (has no administration in time range)   lactated ringers bolus 1,000 mL (0 mLs Intravenous Stopped 8/22/22 1841)   acetaminophen tablet 650 mg (650 mg Oral Given 8/22/22 1709)   morphine injection 4 mg (4 mg Intravenous Given 8/22/22 1710)   ondansetron injection 4 mg (4 mg Intravenous Given 8/22/22 1710)   clindamycin in D5W 900 mg/50 mL IVPB 900 mg (0 mg Intravenous Stopped 8/22/22 1841)   iohexoL (OMNIPAQUE 350) injection 100 mL (100 mLs Intravenous Given 8/22/22 2004)     Medical Decision Making:   History:   Old Medical Records: I decided to obtain old medical records.  Old Records Summarized: other records.       <> Summary of Records: [facility] reviewed as above.  Clinical Tests:   Lab Tests: Ordered and Reviewed  Radiological Study: Ordered and Reviewed    MDM:  The patient underwent emergent evaluation for the above.  He was febrile and tachycardic at the time of my initial evaluation.  There was concern for sepsis.  He is an insulin-dependent diabetic.  Vitor's gangrene was also considered.  His exam was not concerning for necrotizing fasciitis.  White blood cell count was mildly elevated.  IV clindamycin was administered early in his management for presumed skin source of infection.  CT of the left thigh with IV contrast revealed finding consistent with developing abscess signifying incomplete or failed treatment.  The on-call general surgeon was consulted and admitted the patient for further management.             ED Course as of 08/22/22 2224   Mon Aug 22,  2022 2117 General Surgery consulted. I spoke with Dr. Wynne. He recommends admission. [LP]      ED Course User Index  [LP] Fer Thompson III, MD             Clinical Impression:   Final diagnoses:  [L02.416] Abscess of left thigh (Primary)  [E10.65] Type 1 diabetes mellitus with hyperglycemia          ED Disposition Condition    Observation               Fer Thompson III, MD  08/22/22 4124

## 2022-08-22 NOTE — ED TRIAGE NOTES
Clay Waters, an 42 y.o. male presents to the ED with a complaint of fever since Saturday and vomiting today. Pt is a type one diabetic and states he glucose has been running high. Pt was recently seen and treated for an abscess in his left inner upper thigh.     Review of patient's allergies indicates:   Allergen Reactions    Vicodin [hydrocodone-acetaminophen] Nausea And Vomiting     Chief Complaint   Patient presents with    Fever     Pt was seen at [facility] for abscess on Saturday night/Sunday morning. Pt had I&D and started on antibiotics. Pt has continued with drainage and pain. Pt started with fever and vomiting today.      Past Medical History:   Diagnosis Date    Childhood asthma     Class 3 severe obesity due to excess calories with serious comorbidity in adult 1/1/2020    Essential hypertension 10/1/2018    Influenza B 1/1/2020    Insulin pump in place 10/1/2018    Lung nodule 10/2/2018    <1cm multiple lung nodules in the setting of staph infection LTNS No history of cancer    Mild intermittent asthma with exacerbation 1/1/2020    Type 1 diabetes mellitus with hyperglycemia 10/1/2018       Patient identifiers verified and correct.     LOC: The patient is awake, alert and aware of environment with an appropriate affect, the patient is oriented x 3 and speaking appropriately.     APPEARANCE: Patient appears comfortable and in no acute distress, patient is clean and well groomed.    HEENT: Head symmetrical. Eyes bilateral.  Bilateral ears without drainage. Bilateral nares patent, throat clear.    SKIN: The skin is warm and dry, color consistent with ethnicity, patient has normal skin turgor and moist mucus membranes, redness and swelling to left inner upper thigh.      MUSCULOSKELETAL: Patient moving all extremities spontaneously, no swelling noted.    RESPIRATORY: Airway is open and patent, respirations are spontaneous, patient has a normal effort and rate, no accessory muscle use  noted.     CARDIAC: Patient has a normal rate and regular rhythm, no edema noted, capillary refill < 3 seconds.     GASTRO: Abdomen soft and non-distended.      NEURO: Pt opens eyes spontaneously pupils equal, round, and reactive. behavior appropriate to situation, follows commands, facial expression symmetrical,      NEUROVASCULAR: All extremities are warm and pink.       Will continue to monitor.

## 2022-08-23 ENCOUNTER — ANESTHESIA (OUTPATIENT)
Dept: SURGERY | Facility: HOSPITAL | Age: 42
DRG: 603 | End: 2022-08-23
Payer: COMMERCIAL

## 2022-08-23 ENCOUNTER — ANESTHESIA EVENT (OUTPATIENT)
Dept: SURGERY | Facility: HOSPITAL | Age: 42
DRG: 603 | End: 2022-08-23
Payer: COMMERCIAL

## 2022-08-23 PROBLEM — L08.9 SOFT TISSUE INFECTION: Status: ACTIVE | Noted: 2022-08-23

## 2022-08-23 LAB
ANION GAP SERPL CALC-SCNC: 12 MMOL/L (ref 8–16)
BUN SERPL-MCNC: 17 MG/DL (ref 6–20)
CALCIUM SERPL-MCNC: 8.8 MG/DL (ref 8.7–10.5)
CHLORIDE SERPL-SCNC: 99 MMOL/L (ref 95–110)
CO2 SERPL-SCNC: 22 MMOL/L (ref 23–29)
CREAT SERPL-MCNC: 1.3 MG/DL (ref 0.5–1.4)
EST. GFR  (NO RACE VARIABLE): >60 ML/MIN/1.73 M^2
ESTIMATED AVG GLUCOSE: 280 MG/DL (ref 68–131)
GLUCOSE SERPL-MCNC: 300 MG/DL (ref 70–110)
HBA1C MFR BLD: 11.4 % (ref 4–5.6)
POCT GLUCOSE: 227 MG/DL (ref 70–110)
POCT GLUCOSE: 293 MG/DL (ref 70–110)
POCT GLUCOSE: 300 MG/DL (ref 70–110)
POCT GLUCOSE: 359 MG/DL (ref 70–110)
POCT GLUCOSE: 365 MG/DL (ref 70–110)
POCT GLUCOSE: 395 MG/DL (ref 70–110)
POTASSIUM SERPL-SCNC: 4.1 MMOL/L (ref 3.5–5.1)
SODIUM SERPL-SCNC: 133 MMOL/L (ref 136–145)

## 2022-08-23 PROCEDURE — 25000003 PHARM REV CODE 250: Performed by: NURSE ANESTHETIST, CERTIFIED REGISTERED

## 2022-08-23 PROCEDURE — 87102 FUNGUS ISOLATION CULTURE: CPT | Performed by: STUDENT IN AN ORGANIZED HEALTH CARE EDUCATION/TRAINING PROGRAM

## 2022-08-23 PROCEDURE — 36000704 HC OR TIME LEV I 1ST 15 MIN: Performed by: STUDENT IN AN ORGANIZED HEALTH CARE EDUCATION/TRAINING PROGRAM

## 2022-08-23 PROCEDURE — 37000009 HC ANESTHESIA EA ADD 15 MINS: Performed by: STUDENT IN AN ORGANIZED HEALTH CARE EDUCATION/TRAINING PROGRAM

## 2022-08-23 PROCEDURE — 10060 I&D ABSCESS SIMPLE/SINGLE: CPT | Mod: ,,, | Performed by: STUDENT IN AN ORGANIZED HEALTH CARE EDUCATION/TRAINING PROGRAM

## 2022-08-23 PROCEDURE — 94760 N-INVAS EAR/PLS OXIMETRY 1: CPT

## 2022-08-23 PROCEDURE — 87075 CULTR BACTERIA EXCEPT BLOOD: CPT | Performed by: STUDENT IN AN ORGANIZED HEALTH CARE EDUCATION/TRAINING PROGRAM

## 2022-08-23 PROCEDURE — 96372 THER/PROPH/DIAG INJ SC/IM: CPT

## 2022-08-23 PROCEDURE — 63600175 PHARM REV CODE 636 W HCPCS

## 2022-08-23 PROCEDURE — 63600175 PHARM REV CODE 636 W HCPCS: Performed by: STUDENT IN AN ORGANIZED HEALTH CARE EDUCATION/TRAINING PROGRAM

## 2022-08-23 PROCEDURE — 87205 SMEAR GRAM STAIN: CPT | Performed by: STUDENT IN AN ORGANIZED HEALTH CARE EDUCATION/TRAINING PROGRAM

## 2022-08-23 PROCEDURE — 36000705 HC OR TIME LEV I EA ADD 15 MIN: Performed by: STUDENT IN AN ORGANIZED HEALTH CARE EDUCATION/TRAINING PROGRAM

## 2022-08-23 PROCEDURE — 10060 PR DRAIN SKIN ABSCESS SIMPLE: ICD-10-PCS | Mod: ,,, | Performed by: STUDENT IN AN ORGANIZED HEALTH CARE EDUCATION/TRAINING PROGRAM

## 2022-08-23 PROCEDURE — 96367 TX/PROPH/DG ADDL SEQ IV INF: CPT

## 2022-08-23 PROCEDURE — 25000003 PHARM REV CODE 250: Performed by: STUDENT IN AN ORGANIZED HEALTH CARE EDUCATION/TRAINING PROGRAM

## 2022-08-23 PROCEDURE — 25000242 PHARM REV CODE 250 ALT 637 W/ HCPCS: Performed by: STUDENT IN AN ORGANIZED HEALTH CARE EDUCATION/TRAINING PROGRAM

## 2022-08-23 PROCEDURE — 63600175 PHARM REV CODE 636 W HCPCS: Performed by: NURSE ANESTHETIST, CERTIFIED REGISTERED

## 2022-08-23 PROCEDURE — 96372 THER/PROPH/DIAG INJ SC/IM: CPT | Performed by: STUDENT IN AN ORGANIZED HEALTH CARE EDUCATION/TRAINING PROGRAM

## 2022-08-23 PROCEDURE — 99222 1ST HOSP IP/OBS MODERATE 55: CPT | Mod: 25,,, | Performed by: STUDENT IN AN ORGANIZED HEALTH CARE EDUCATION/TRAINING PROGRAM

## 2022-08-23 PROCEDURE — 99222 PR INITIAL HOSPITAL CARE,LEVL II: ICD-10-PCS | Mod: 25,,, | Performed by: STUDENT IN AN ORGANIZED HEALTH CARE EDUCATION/TRAINING PROGRAM

## 2022-08-23 PROCEDURE — 96375 TX/PRO/DX INJ NEW DRUG ADDON: CPT

## 2022-08-23 PROCEDURE — 87206 SMEAR FLUORESCENT/ACID STAI: CPT | Performed by: STUDENT IN AN ORGANIZED HEALTH CARE EDUCATION/TRAINING PROGRAM

## 2022-08-23 PROCEDURE — 87076 CULTURE ANAEROBE IDENT EACH: CPT | Performed by: STUDENT IN AN ORGANIZED HEALTH CARE EDUCATION/TRAINING PROGRAM

## 2022-08-23 PROCEDURE — 87070 CULTURE OTHR SPECIMN AEROBIC: CPT | Performed by: STUDENT IN AN ORGANIZED HEALTH CARE EDUCATION/TRAINING PROGRAM

## 2022-08-23 PROCEDURE — 87077 CULTURE AEROBIC IDENTIFY: CPT | Performed by: STUDENT IN AN ORGANIZED HEALTH CARE EDUCATION/TRAINING PROGRAM

## 2022-08-23 PROCEDURE — G0378 HOSPITAL OBSERVATION PER HR: HCPCS

## 2022-08-23 PROCEDURE — 27000190 HC CPAP FULL FACE MASK W/VALVE

## 2022-08-23 PROCEDURE — 96366 THER/PROPH/DIAG IV INF ADDON: CPT

## 2022-08-23 PROCEDURE — 94761 N-INVAS EAR/PLS OXIMETRY MLT: CPT

## 2022-08-23 PROCEDURE — 25000003 PHARM REV CODE 250

## 2022-08-23 PROCEDURE — 94640 AIRWAY INHALATION TREATMENT: CPT

## 2022-08-23 PROCEDURE — 71000033 HC RECOVERY, INTIAL HOUR: Performed by: STUDENT IN AN ORGANIZED HEALTH CARE EDUCATION/TRAINING PROGRAM

## 2022-08-23 PROCEDURE — 36415 COLL VENOUS BLD VENIPUNCTURE: CPT | Performed by: STUDENT IN AN ORGANIZED HEALTH CARE EDUCATION/TRAINING PROGRAM

## 2022-08-23 PROCEDURE — 96361 HYDRATE IV INFUSION ADD-ON: CPT

## 2022-08-23 PROCEDURE — 99900035 HC TECH TIME PER 15 MIN (STAT)

## 2022-08-23 PROCEDURE — 37000008 HC ANESTHESIA 1ST 15 MINUTES: Performed by: STUDENT IN AN ORGANIZED HEALTH CARE EDUCATION/TRAINING PROGRAM

## 2022-08-23 PROCEDURE — 80048 BASIC METABOLIC PNL TOTAL CA: CPT | Performed by: STUDENT IN AN ORGANIZED HEALTH CARE EDUCATION/TRAINING PROGRAM

## 2022-08-23 PROCEDURE — 87116 MYCOBACTERIA CULTURE: CPT | Performed by: STUDENT IN AN ORGANIZED HEALTH CARE EDUCATION/TRAINING PROGRAM

## 2022-08-23 PROCEDURE — 87186 SC STD MICRODIL/AGAR DIL: CPT | Performed by: STUDENT IN AN ORGANIZED HEALTH CARE EDUCATION/TRAINING PROGRAM

## 2022-08-23 RX ORDER — MIDAZOLAM HYDROCHLORIDE 1 MG/ML
INJECTION, SOLUTION INTRAMUSCULAR; INTRAVENOUS
Status: DISCONTINUED | OUTPATIENT
Start: 2022-08-23 | End: 2022-08-23

## 2022-08-23 RX ORDER — BUPIVACAINE HYDROCHLORIDE 2.5 MG/ML
INJECTION, SOLUTION EPIDURAL; INFILTRATION; INTRACAUDAL
Status: DISCONTINUED | OUTPATIENT
Start: 2022-08-23 | End: 2022-08-23 | Stop reason: HOSPADM

## 2022-08-23 RX ORDER — IBUPROFEN 200 MG
24 TABLET ORAL
Status: CANCELLED | OUTPATIENT
Start: 2022-08-23

## 2022-08-23 RX ORDER — PROCHLORPERAZINE EDISYLATE 5 MG/ML
5 INJECTION INTRAMUSCULAR; INTRAVENOUS EVERY 30 MIN PRN
Status: CANCELLED | OUTPATIENT
Start: 2022-08-23

## 2022-08-23 RX ORDER — HYDROMORPHONE HYDROCHLORIDE 2 MG/ML
0.2 INJECTION, SOLUTION INTRAMUSCULAR; INTRAVENOUS; SUBCUTANEOUS EVERY 5 MIN PRN
Status: CANCELLED | OUTPATIENT
Start: 2022-08-23

## 2022-08-23 RX ORDER — PROPOFOL 10 MG/ML
VIAL (ML) INTRAVENOUS CONTINUOUS PRN
Status: DISCONTINUED | OUTPATIENT
Start: 2022-08-23 | End: 2022-08-23

## 2022-08-23 RX ORDER — LIDOCAINE HYDROCHLORIDE 10 MG/ML
INJECTION, SOLUTION EPIDURAL; INFILTRATION; INTRACAUDAL; PERINEURAL
Status: DISCONTINUED | OUTPATIENT
Start: 2022-08-23 | End: 2022-08-23 | Stop reason: HOSPADM

## 2022-08-23 RX ORDER — ONDANSETRON 2 MG/ML
4 INJECTION INTRAMUSCULAR; INTRAVENOUS DAILY PRN
Status: CANCELLED | OUTPATIENT
Start: 2022-08-23

## 2022-08-23 RX ORDER — GLUCAGON 1 MG
1 KIT INJECTION
Status: DISCONTINUED | OUTPATIENT
Start: 2022-08-23 | End: 2022-08-25 | Stop reason: HOSPADM

## 2022-08-23 RX ORDER — GLUCAGON 1 MG
1 KIT INJECTION
Status: DISCONTINUED | OUTPATIENT
Start: 2022-08-23 | End: 2022-08-23

## 2022-08-23 RX ORDER — PROPOFOL 10 MG/ML
VIAL (ML) INTRAVENOUS
Status: DISCONTINUED | OUTPATIENT
Start: 2022-08-23 | End: 2022-08-23

## 2022-08-23 RX ORDER — HYDROMORPHONE HYDROCHLORIDE 2 MG/ML
0.5 INJECTION, SOLUTION INTRAMUSCULAR; INTRAVENOUS; SUBCUTANEOUS EVERY 5 MIN PRN
Status: DISCONTINUED | OUTPATIENT
Start: 2022-08-23 | End: 2022-08-23 | Stop reason: HOSPADM

## 2022-08-23 RX ORDER — IBUPROFEN 200 MG
16 TABLET ORAL
Status: DISCONTINUED | OUTPATIENT
Start: 2022-08-23 | End: 2022-08-25 | Stop reason: HOSPADM

## 2022-08-23 RX ORDER — IPRATROPIUM BROMIDE AND ALBUTEROL SULFATE 2.5; .5 MG/3ML; MG/3ML
3 SOLUTION RESPIRATORY (INHALATION) EVERY 6 HOURS PRN
Status: DISCONTINUED | OUTPATIENT
Start: 2022-08-23 | End: 2022-08-25 | Stop reason: HOSPADM

## 2022-08-23 RX ORDER — INSULIN LISPRO 100 [IU]/ML
0-2.1 INJECTION, SOLUTION INTRAVENOUS; SUBCUTANEOUS CONTINUOUS
Status: DISCONTINUED | OUTPATIENT
Start: 2022-08-23 | End: 2022-08-25 | Stop reason: HOSPADM

## 2022-08-23 RX ORDER — LIDOCAINE HYDROCHLORIDE 20 MG/ML
INJECTION, SOLUTION EPIDURAL; INFILTRATION; INTRACAUDAL; PERINEURAL
Status: DISCONTINUED | OUTPATIENT
Start: 2022-08-23 | End: 2022-08-23

## 2022-08-23 RX ORDER — GLUCAGON 1 MG
1 KIT INJECTION
Status: CANCELLED | OUTPATIENT
Start: 2022-08-23

## 2022-08-23 RX ORDER — INSULIN ASPART 100 [IU]/ML
1-10 INJECTION, SOLUTION INTRAVENOUS; SUBCUTANEOUS EVERY 6 HOURS PRN
Status: DISCONTINUED | OUTPATIENT
Start: 2022-08-23 | End: 2022-08-23

## 2022-08-23 RX ORDER — IBUPROFEN 200 MG
24 TABLET ORAL
Status: DISCONTINUED | OUTPATIENT
Start: 2022-08-23 | End: 2022-08-25 | Stop reason: HOSPADM

## 2022-08-23 RX ORDER — IBUPROFEN 200 MG
16 TABLET ORAL
Status: CANCELLED | OUTPATIENT
Start: 2022-08-23

## 2022-08-23 RX ADMIN — Medication 6 MG: at 08:08

## 2022-08-23 RX ADMIN — ACETAMINOPHEN 650 MG: 325 TABLET ORAL at 08:08

## 2022-08-23 RX ADMIN — VANCOMYCIN HYDROCHLORIDE 2000 MG: 1 INJECTION, POWDER, LYOPHILIZED, FOR SOLUTION INTRAVENOUS at 11:08

## 2022-08-23 RX ADMIN — INSULIN HUMAN 10 UNITS: 100 INJECTION, SOLUTION PARENTERAL at 01:08

## 2022-08-23 RX ADMIN — LIDOCAINE HYDROCHLORIDE 100 MG: 20 INJECTION, SOLUTION EPIDURAL; INFILTRATION; INTRACAUDAL; PERINEURAL at 12:08

## 2022-08-23 RX ADMIN — IPRATROPIUM BROMIDE AND ALBUTEROL SULFATE 3 ML: 2.5; .5 SOLUTION RESPIRATORY (INHALATION) at 07:08

## 2022-08-23 RX ADMIN — INSULIN DETEMIR 25 UNITS: 100 INJECTION, SOLUTION SUBCUTANEOUS at 10:08

## 2022-08-23 RX ADMIN — PROPOFOL 100 MCG/KG/MIN: 10 INJECTION, EMULSION INTRAVENOUS at 12:08

## 2022-08-23 RX ADMIN — CLINDAMYCIN IN 5 PERCENT DEXTROSE 900 MG: 18 INJECTION, SOLUTION INTRAVENOUS at 12:08

## 2022-08-23 RX ADMIN — INSULIN HUMAN 10 UNITS: 100 INJECTION, SOLUTION PARENTERAL at 12:08

## 2022-08-23 RX ADMIN — OXYCODONE HYDROCHLORIDE 5 MG: 5 TABLET ORAL at 07:08

## 2022-08-23 RX ADMIN — VANCOMYCIN HYDROCHLORIDE 2500 MG: 500 INJECTION, POWDER, LYOPHILIZED, FOR SOLUTION INTRAVENOUS at 10:08

## 2022-08-23 RX ADMIN — CLINDAMYCIN IN 5 PERCENT DEXTROSE 900 MG: 18 INJECTION, SOLUTION INTRAVENOUS at 05:08

## 2022-08-23 RX ADMIN — PIPERACILLIN AND TAZOBACTAM 4.5 G: 4; .5 INJECTION, POWDER, LYOPHILIZED, FOR SOLUTION INTRAVENOUS; PARENTERAL at 08:08

## 2022-08-23 RX ADMIN — HYDROMORPHONE HYDROCHLORIDE 0.5 MG: 2 INJECTION, SOLUTION INTRAMUSCULAR; INTRAVENOUS; SUBCUTANEOUS at 01:08

## 2022-08-23 RX ADMIN — SODIUM CHLORIDE, SODIUM LACTATE, POTASSIUM CHLORIDE, AND CALCIUM CHLORIDE: .6; .31; .03; .02 INJECTION, SOLUTION INTRAVENOUS at 03:08

## 2022-08-23 RX ADMIN — OXYCODONE HYDROCHLORIDE 5 MG: 5 TABLET ORAL at 05:08

## 2022-08-23 RX ADMIN — MIDAZOLAM 2 MG: 1 INJECTION INTRAMUSCULAR; INTRAVENOUS at 12:08

## 2022-08-23 RX ADMIN — PIPERACILLIN AND TAZOBACTAM 4.5 G: 4; .5 INJECTION, POWDER, LYOPHILIZED, FOR SOLUTION INTRAVENOUS; PARENTERAL at 01:08

## 2022-08-23 RX ADMIN — OXYCODONE HYDROCHLORIDE 5 MG: 5 TABLET ORAL at 12:08

## 2022-08-23 RX ADMIN — PROPOFOL 30 MG: 10 INJECTION, EMULSION INTRAVENOUS at 12:08

## 2022-08-23 RX ADMIN — SODIUM CHLORIDE: 0.9 INJECTION, SOLUTION INTRAVENOUS at 12:08

## 2022-08-23 RX ADMIN — INSULIN LISPRO 2 UNITS/HR: 100 INJECTION, SOLUTION INTRAVENOUS; SUBCUTANEOUS at 05:08

## 2022-08-23 RX ADMIN — INSULIN ASPART 6 UNITS: 100 INJECTION, SOLUTION INTRAVENOUS; SUBCUTANEOUS at 05:08

## 2022-08-23 RX ADMIN — SODIUM CHLORIDE, SODIUM LACTATE, POTASSIUM CHLORIDE, AND CALCIUM CHLORIDE: .6; .31; .03; .02 INJECTION, SOLUTION INTRAVENOUS at 12:08

## 2022-08-23 NOTE — PROGRESS NOTES
Pharmacokinetic Initial Assessment: IV Vancomycin    Assessment/Plan:    Initiate intravenous vancomycin with loading dose of 2500 mg once followed by a maintenance dose of vancomycin 2000 mg IV every 12 hours  Desired empiric serum trough concentration is 10 to 15 mcg/mL  Draw vancomycin trough level 60 min prior to fourth dose on 8/24/22 at approximately 2145  Pharmacy will continue to follow and monitor vancomycin.      Please contact pharmacy at extension [phone] with any questions regarding this assessment.     Thank you for the consult,   Rupert Catalan       Patient brief summary:  Clay Waters is a 42 y.o. male initiated on antimicrobial therapy with IV Vancomycin for treatment of suspected skin & soft tissue infection    Drug Allergies:   Review of patient's allergies indicates:   Allergen Reactions    Vicodin [hydrocodone-acetaminophen] Nausea And Vomiting       Actual Body Weight:   144.8 kg    Renal Function:   Estimated Creatinine Clearance: 111.6 mL/min (based on SCr of 1.3 mg/dL).,     Dialysis Method (if applicable):  N/A    CBC (last 72 hours):  Recent Labs   Lab Result Units 08/22/22  1654   WBC K/uL 14.12*   Hemoglobin g/dL 13.1*   Hemoglobin A1C % 11.4*   Hematocrit % 38.4*   Platelets K/uL 278   Gran % % 82.7*   Lymph % % 5.2*   Mono % % 11.0   Eosinophil % % 0.2   Basophil % % 0.5   Differential Method  Automated       Metabolic Panel (last 72 hours):  Recent Labs   Lab Result Units 08/22/22  1654 08/22/22  1719 08/23/22  0551   Sodium mmol/L 132*  --  133*   Potassium mmol/L 3.9  --  4.1   Chloride mmol/L 98  --  99   CO2 mmol/L 19*  --  22*   Glucose mg/dL 296*  --  300*   Glucose, UA   --  4+*  --    BUN mg/dL 18  --  17   Creatinine mg/dL 1.4  --  1.3   Albumin g/dL 3.3*  --   --    Total Bilirubin mg/dL 0.7  --   --    Alkaline Phosphatase U/L 105  --   --    AST U/L 14  --   --    ALT U/L 14  --   --        Drug levels (last 3 results):  No results for input(s): VANCOMYCINRA,  VANCORANDOM, VANCOMYCINPE, VANCOPEAK, VANCOMYCINTR, VANCOTROUGH in the last 72 hours.    Microbiologic Results:  Microbiology Results (last 7 days)       Procedure Component Value Units Date/Time    Blood culture #2 [[id number]] Collected: 08/22/22 1653    Order Status: Completed Specimen: Blood from Peripheral, Antecubital, Left Updated: 08/23/22 0715     Blood Culture, Routine No Growth to date    Narrative:      Blood Culture #2    Blood culture #1 [[id number]] Collected: 08/22/22 1716    Order Status: Completed Specimen: Blood from Peripheral, Antecubital, Right Updated: 08/23/22 0715     Blood Culture, Routine No Growth to date    Narrative:      Blood Culture #1

## 2022-08-23 NOTE — SUBJECTIVE & OBJECTIVE
No current facility-administered medications on file prior to encounter.     Current Outpatient Medications on File Prior to Encounter   Medication Sig    HUMALOG U-100 INSULIN 100 unit/mL injection USE WITH INSULIN PUMP. MAX DAILY DOSE 150 UNITS DAILY    losartan (COZAAR) 50 MG tablet Take 1 tablet (50 mg total) by mouth once daily.    sulfamethoxazole-trimethoprim 800-160mg (BACTRIM DS) 800-160 mg Tab Take 1 tablet by mouth 2 (two) times daily. for 7 days    insulin lispro 100 unit/mL injection Use with insulin pump. Max daily dose 150 units/day    INSULIN PUMP CARTRIDGE SUBQ Inject into the skin.    NOVOLOG U-100 INSULIN ASPART 100 unit/mL injection USE WITH INSULIN PUMP. MAX DAILY DOSE 140 UNITS/DAY    tadalafiL (CIALIS) 10 MG tablet TAKE 1 TABLET (10 MG TOTAL) BY MOUTH DAILY AS NEEDED FOR ERECTILE DYSFUNCTION.       Review of patient's allergies indicates:   Allergen Reactions    Vicodin [hydrocodone-acetaminophen] Nausea And Vomiting       Past Medical History:   Diagnosis Date    Childhood asthma     Class 3 severe obesity due to excess calories with serious comorbidity in adult 1/1/2020    Essential hypertension 10/1/2018    Influenza B 1/1/2020    Insulin pump in place 10/1/2018    Lung nodule 10/2/2018    <1cm multiple lung nodules in the setting of staph infection LTNS No history of cancer    Mild intermittent asthma with exacerbation 1/1/2020    Type 1 diabetes mellitus with hyperglycemia 10/1/2018     Past Surgical History:   Procedure Laterality Date    COLONOSCOPY N/A 11/4/2020    Procedure: COLONOSCOPY;  Surgeon: Jesus Claros MD;  Location: [address]);  Service: Endoscopy;  Laterality: N/A;    ESOPHAGOGASTRODUODENOSCOPY N/A 11/4/2020    Procedure: EGD (ESOPHAGOGASTRODUODENOSCOPY);  Surgeon: Jesus Claros MD;  Location: [facility] ([address] [place] [address]);  Service: Endoscopy;  Laterality: N/A;  2nd floor for wt/neck. has insulin pump     pt requesting a Wed-doesn't want to wait until December for   Larissa availability       COVID test at [facility] on 11/1-GT    VASECTOMY       Family History       Problem Relation (Age of Onset)    Celiac disease Father, Daughter          Tobacco Use    Smoking status: Never Smoker    Smokeless tobacco: Never Used   Substance and Sexual Activity    Alcohol use: Yes     Comment: occas, none recently    Drug use: No    Sexual activity: Not on file     Review of Systems   Constitutional:  Positive for chills and fever.   Respiratory:  Negative for cough and shortness of breath.    Cardiovascular:  Negative for chest pain.   Gastrointestinal:  Negative for abdominal distention and abdominal pain.   Genitourinary:  Negative for difficulty urinating and dysuria.   Neurological:  Positive for headaches. Negative for dizziness and light-headedness.   Objective:     Vital Signs (Most Recent):  Temp: 99.4 °F (37.4 °C) (08/23/22 0312)  Pulse: 70 (08/23/22 0400)  Resp: 18 (08/23/22 0527)  BP: (!) 119/59 (08/23/22 0312)  SpO2: 98 % (08/23/22 0312)   Vital Signs (24h Range):  Temp:  [97.6 °F (36.4 °C)-101 °F (38.3 °C)] 99.4 °F (37.4 °C)  Pulse:  [69-96] 70  Resp:  [16-18] 18  SpO2:  [96 %-99 %] 98 %  BP: (107-135)/(52-79) 119/59     Weight: (!) 144.8 kg (319 lb 3.6 oz)  Body mass index is 43.29 kg/m².    Physical Exam  Vitals reviewed.   Constitutional:       Appearance: He is obese.   Cardiovascular:      Rate and Rhythm: Normal rate.      Pulses: Normal pulses.   Pulmonary:      Effort: Pulmonary effort is normal.   Abdominal:      General: Abdomen is flat.      Palpations: Abdomen is soft.   Genitourinary:     Comments: Induration and erythema of the proximal left medial thigh, adjacent to the scrotum  Skin:     General: Skin is warm and dry.   Neurological:      General: No focal deficit present.      Mental Status: He is alert and oriented to person, place, and time.       Significant Labs:  I have reviewed all pertinent lab results within the past 24 hours.  CBC:   Recent Labs    Lab 08/22/22  1654   WBC 14.12*   RBC 4.89   HGB 13.1*   HCT 38.4*      MCV 79*   MCH 26.8*   MCHC 34.1     BMP:   Recent Labs   Lab 08/22/22  1654   *   *   K 3.9   CL 98   CO2 19*   BUN 18   CREATININE 1.4   CALCIUM 9.1       Significant Diagnostics:  I have reviewed all pertinent imaging results/findings within the past 24 hours.

## 2022-08-23 NOTE — ANESTHESIA POSTPROCEDURE EVALUATION
Anesthesia Post Evaluation    Patient: Clay Waters    Procedure(s) Performed: Procedure(s) (LRB):  INCISION AND DRAINAGE, ABSCESS (Left)    Final Anesthesia Type: MAC      Patient location during evaluation: OPS  Patient participation: Yes- Able to Participate  Level of consciousness: awake and alert  Post-procedure vital signs: reviewed and stable  Pain management: adequate  Airway patency: patent  ROSA ELENA mitigation strategies: Multimodal analgesia  PONV status at discharge: No PONV  Anesthetic complications: no      Cardiovascular status: blood pressure returned to baseline and hemodynamically stable  Respiratory status: room air  Hydration status: euvolemic    Comments: Patient with hyperglycemia. 10 more U of IV insulin given in PACU for glucose 356. Patient normally on insulin gtt for T1DM, but he was told to stop it while inpatient. Discussed with Dr. Wynne that patient should keep his insulin pump on during hospitalization. Dr. Wynne aware and agrees.           Vitals Value Taken Time   /58 08/23/22 1340   Temp  08/23/22 1341   Pulse 70 08/23/22 1341   Resp 18 08/23/22 1341   SpO2 95 % 08/23/22 1341   Vitals shown include unvalidated device data.      No case tracking events are documented in the log.      Pain/Karla Score: Pain Rating Prior to Med Admin: 0 (8/23/2022  8:28 AM)  Pain Rating Post Med Admin: 4 (8/23/2022  1:12 AM)

## 2022-08-23 NOTE — ASSESSMENT & PLAN NOTE
43 yo male admitted with a soft tissue infection of the proximal left medial thigh s/p attempted I+D on 8/20. Given persistence of symptoms, with fluid seen on CT scan, in the presence of T1DM, we will proceed with I+D in the OR. I discussed the risks and benefits of I+D with Destini Evelin and he would like to proceed with surgery.    - Keep NPO  - Continue IV antibiotics  - Consent signed  - PRN pain regimen  - OR for I+D of left medial thigh and all indicated procedures

## 2022-08-23 NOTE — PLAN OF CARE
Signed out from pacu per Dr Aguilar. Report called to Cristina Davalos/5A. Transported to room 517 via stretcher,sr upx2.

## 2022-08-23 NOTE — PLAN OF CARE
Patient on RA in no apparent distress, given PRN arousal treatment, no adverse reactions will continue to monitor.

## 2022-08-23 NOTE — TRANSFER OF CARE
Anesthesia Transfer of Care Note    Patient: Clay Waters    Procedure(s) Performed: Procedure(s) (LRB):  INCISION AND DRAINAGE, ABSCESS (Left)    Patient location: PACU    Anesthesia Type: MAC    Transport from OR: Transported from OR on room air with adequate spontaneous ventilation    Post pain: adequate analgesia    Post assessment: no apparent anesthetic complications    Post vital signs: stable    Level of consciousness: awake and alert    Nausea/Vomiting: no nausea/vomiting    Complications: none    Transfer of care protocol was followed      Last vitals:   Visit Vitals  BP (!) 105/53   Pulse 67   Temp 37.3 °C (99.2 °F)   Resp 20   Ht 6' (1.829 m)   Wt (!) 144.8 kg (319 lb 3.6 oz)   SpO2 95%   BMI 43.29 kg/m²

## 2022-08-23 NOTE — PLAN OF CARE
SW met with pt [employer] to complete assessment. Pt is AxO 3  and able to verbally answer assessment questions.  Pt is able to confirm demographic information. Pt reports at time of discharge to be transported back home by his spouse Ginger. Pt reports living at home with spouse Ginger and family. Pt reports spouse Ginger to also be emergency contact. Pt reprots while at home being independent of ADL's. Pt reports only DME to include CPAP machine. Pt reports receiving this machine from [facility] about 2yrs ago. SW informed pt of timeframe to get follow up sleep clinic study completed and how to locate DME company on machine to servicing needs. SW confirmed pt not on dialysis or blood thinners. Pt reports ability to drive and get to follow up appointments. Pt confirmed PCP. SW updated whiteboard with SWCM name and contact information. SW confirmed pt understanding of Observation unit and expected discharge plan. SW will continue to follow pt throughout care and assist with any discharge needs.         08/23/22 1207   Discharge Planning   Assessment Type Discharge Planning Brief Assessment   Resource/Environmental Concerns none   Support Systems Spouse/significant other;Children   Equipment Currently Used at Home CPAP   Current Living Arrangements home/apartment/condo   Patient/Family Anticipates Transition to home with family   Patient/Family Anticipated Services at Transition none   DME Needed Upon Discharge  none   Discharge Plan A Home with family     Future Appointments   Date Time Provider Department Center   8/31/2022  3:20 PM Turner Wynne MD [facility]  [phone]

## 2022-08-23 NOTE — PLAN OF CARE
AAOx4. Minimal c/o pain post-op. Tolerating diabetic diet. IVF and IV ABX given per MAR. L groin incision, dressing CDI. Blood glucose and cardiac monitoring maintained. Pt with own insulin pump. Bed locked in lowest position and call bell within reach.

## 2022-08-23 NOTE — OP NOTE
[place] - Surgery ([facility])  General Surgery  Operative Note    SUMMARY     Date of Procedure: 8/23/2022     Procedure: Procedure(s) (LRB):  INCISION AND DRAINAGE, ABSCESS (Left)       Surgeon(s) and Role:     * Turner Wynne MD - Primary    Assisting Surgeon: Eleazar Milligan PGY2    Pre-Operative Diagnosis: Abscess of left thigh [L02.416]    Post-Operative Diagnosis: Post-Op Diagnosis Codes:     * Abscess of left thigh [L02.416]    Anesthesia: Local MAC    Operative Findings (including complications, if any):   Left medial upper thigh abscess tracking superiorly and laterally  Cultures sent  Washed out and packed    Description of Technical Procedures: The left groin was prepped and draped in sterile fashion. There was an area of purulent drainage from the skin of the medial upper thigh. This was incised sharply. There was a cavity noted to track superiorly and laterally that contained purulent fluid. The incision was extended laterally and probed. All loculations were broken up. Some surrounding induration but no additional abscess cavities were found. Cultures were sent. The cavity was irrigated with betadine soaked gauze then packed.     Significant Surgical Tasks Conducted by the Assistant(s), if Applicable:     Estimated Blood Loss (EBL): 10ml    Implants: * No implants in log *    Specimens:   Specimen (24h ago, onward)            None                  Condition: Stable    Disposition: PACU - hemodynamically stable.    Attestation: I was present and scrubbed for the entire procedure.

## 2022-08-23 NOTE — PLAN OF CARE
Pt c/o abscess to groin getting larger. Area is slightly larger, draining moderately w/o odor. Respiratory notified of difficulty breathing. Breathing treatment ordered.Pt has been NPO since midnight in preparation for surgery.

## 2022-08-23 NOTE — NURSING
Patient wheezing this morning. States he is recovering from a cold and the wheezing is not new. Also states he has a history of asthma and left his inhaler at home. Dr. Wynne notified.

## 2022-08-23 NOTE — HPI
The patient is a 42-year-old who has a history childhood-onset asthma, severe obesity, central hypertension, and type I diabetes mellitus and presents with worsening pain from a left proximal inner thigh lesion that he first noticed six days ago. He was seen in the ED at [facility] 3 days ago and underwent I+D and was prescribed a course of Bactrim. However he has had persistent fevers since discharge and feels like the swelling and induration of the area have worsened. He presented to our ED on 8/22 where workup revealed fever, leukocytosis, and a CT scan demonstrating a fluid collection of the prior I+D site.

## 2022-08-23 NOTE — ANESTHESIA PREPROCEDURE EVALUATION
08/23/2022  Clay Waters is a 42 y.o., male with PMH of HTN, T1DM, asthma, lung nodule, obesity here for left inner thigh I&D.    Review of patient's allergies indicates:   Allergen Reactions    Vicodin [hydrocodone-acetaminophen] Nausea And Vomiting       Past Medical History:   Diagnosis Date    Childhood asthma     Class 3 severe obesity due to excess calories with serious comorbidity in adult 1/1/2020    Essential hypertension 10/1/2018    Influenza B 1/1/2020    Insulin pump in place 10/1/2018    Lung nodule 10/2/2018    <1cm multiple lung nodules in the setting of staph infection LTNS No history of cancer    Mild intermittent asthma with exacerbation 1/1/2020    Type 1 diabetes mellitus with hyperglycemia 10/1/2018       Past Surgical History:   Procedure Laterality Date    COLONOSCOPY N/A 11/4/2020    Procedure: COLONOSCOPY;  Surgeon: Jesus Claros MD;  Location: [facility] (2ND FLR);  Service: Endoscopy;  Laterality: N/A;    ESOPHAGOGASTRODUODENOSCOPY N/A 11/4/2020    Procedure: EGD (ESOPHAGOGASTRODUODENOSCOPY);  Surgeon: Jesus Claros MD;  [facility]  Service: Endoscopy;  Laterality: N/A;  2nd floor for wt/neck. has insulin pump     pt requesting a Wed-doesn't want to wait until December for Dr Larissa morales       COVID test at [facility] on 11/1-GT    VASECTOMY         Outpatient Meds  No current facility-administered medications on file prior to encounter.     Current Outpatient Medications on File Prior to Encounter   Medication Sig Dispense Refill    HUMALOG U-100 INSULIN 100 unit/mL injection USE WITH INSULIN PUMP. MAX DAILY DOSE 150 UNITS DAILY 120 mL 3    INSULIN PUMP CARTRIDGE SUBQ Inject into the skin.      losartan (COZAAR) 50 MG tablet Take 1 tablet (50 mg total) by mouth once daily. 90 tablet 3    sulfamethoxazole-trimethoprim  800-160mg (BACTRIM DS) 800-160 mg Tab Take 1 tablet by mouth 2 (two) times daily. for 7 days 14 tablet 0    NOVOLOG U-100 INSULIN ASPART 100 unit/mL injection USE WITH INSULIN PUMP. MAX DAILY DOSE 140 UNITS/ mL 3    tadalafiL (CIALIS) 10 MG tablet TAKE 1 TABLET (10 MG TOTAL) BY MOUTH DAILY AS NEEDED FOR ERECTILE DYSFUNCTION. 30 tablet 5       Inpatient Scheduled Meds   clindamycin (CLEOCIN) IVPB  900 mg Intravenous Q6H    losartan  50 mg Oral Daily       Trended Lab Data:  Recent Labs   Lab 08/22/22  1654   WBC 14.12*   HGB 13.1*   HCT 38.4*      MCV 79*   RDW 13.2   *   K 3.9   CL 98   CO2 19*   BUN 18   CREATININE 1.4   *   PROT 7.1   ALBUMIN 3.3*   BILITOT 0.7   AST 14   ALKPHOS 105   ALT 14       Coags:   Lab Results   Component Value Date    INR 1.0 09/30/2018           Pre-op Assessment    I have reviewed the Patient Summary Reports.     I have reviewed the Nursing Notes. I have reviewed the NPO Status.      Review of Systems  Anesthesia Hx:  No problems with previous Anesthesia   Denies Personal Hx of Anesthesia complications.   Social:  Non-Smoker, Social Alcohol Use    Hematology/Oncology:  Hematology Normal   Oncology Normal     EENT/Dental:EENT/Dental Normal   Cardiovascular:   Hypertension    Pulmonary:   Asthma    Renal/:  Renal/ Normal     Hepatic/GI:  Hepatic/GI Normal    Musculoskeletal:  Musculoskeletal Normal    Neurological:  Neurology Normal    Endocrine:   Diabetes, type 1, using insulin    Psych:  Psychiatric Normal           Physical Exam  General: Cooperative, Alert and Oriented    Airway:  Mallampati: III   Mouth Opening: Normal  TM Distance: Normal  Tongue: Normal  Neck ROM: Normal ROM    Dental:  Intact        Anesthesia Plan  Type of Anesthesia, risks & benefits discussed:    Anesthesia Type: Gen ETT, Regional  Intra-op Monitoring Plan: Standard ASA Monitors  Post Op Pain Control Plan: multimodal analgesia  Induction:  IV  Airway Plan: Video,  Post-Induction  Informed Consent: Informed consent signed with the Patient and all parties understand the risks and agree with anesthesia plan.  All questions answered.   ASA Score: 3  Day of Surgery Review of History & Physical: H&P Update referred to the surgeon/provider.    Ready For Surgery From Anesthesia Perspective.     .

## 2022-08-23 NOTE — PROGRESS NOTES
08/22/22 2305   Admission   Initial VN Admission Questions Complete   Communication Issues? None   Shift   Virtual Nurse - Patient Verbalized Approval Of Camera Use   Safety/Activity   Patient Rounds visualized patient   Safety Promotion/Fall Prevention instructed to call staff for mobility;side rails raised x 2;Fall Risk reviewed with patient/family   VIRTUAL NURSE: Admission questions completed with patient at this time. Care plan and safety precautions reviewed. Pt verbalized no complaints, requesting towels for a shower, notified bedside nurse of patients request. Instructed to use call light for assistance, he verbalized understanding.

## 2022-08-24 LAB
ALBUMIN SERPL BCP-MCNC: 2.6 G/DL (ref 3.5–5.2)
ANION GAP SERPL CALC-SCNC: 10 MMOL/L (ref 8–16)
BASOPHILS # BLD AUTO: 0.07 K/UL (ref 0–0.2)
BASOPHILS NFR BLD: 0.7 % (ref 0–1.9)
BUN SERPL-MCNC: 13 MG/DL (ref 6–20)
CALCIUM SERPL-MCNC: 8.6 MG/DL (ref 8.7–10.5)
CHLORIDE SERPL-SCNC: 100 MMOL/L (ref 95–110)
CO2 SERPL-SCNC: 25 MMOL/L (ref 23–29)
CREAT SERPL-MCNC: 1.1 MG/DL (ref 0.5–1.4)
DIFFERENTIAL METHOD: ABNORMAL
EOSINOPHIL # BLD AUTO: 0.1 K/UL (ref 0–0.5)
EOSINOPHIL NFR BLD: 1.2 % (ref 0–8)
ERYTHROCYTE [DISTWIDTH] IN BLOOD BY AUTOMATED COUNT: 13.5 % (ref 11.5–14.5)
EST. GFR  (NO RACE VARIABLE): >60 ML/MIN/1.73 M^2
GLUCOSE SERPL-MCNC: 207 MG/DL (ref 70–110)
GRAM STN SPEC: NORMAL
GRAM STN SPEC: NORMAL
HCT VFR BLD AUTO: 37 % (ref 40–54)
HGB BLD-MCNC: 12.6 G/DL (ref 14–18)
IMM GRANULOCYTES # BLD AUTO: 0.05 K/UL (ref 0–0.04)
IMM GRANULOCYTES NFR BLD AUTO: 0.5 % (ref 0–0.5)
LYMPHOCYTES # BLD AUTO: 0.8 K/UL (ref 1–4.8)
LYMPHOCYTES NFR BLD: 7.6 % (ref 18–48)
MCH RBC QN AUTO: 26.5 PG (ref 27–31)
MCHC RBC AUTO-ENTMCNC: 34.1 G/DL (ref 32–36)
MCV RBC AUTO: 78 FL (ref 82–98)
MONOCYTES # BLD AUTO: 1 K/UL (ref 0.3–1)
MONOCYTES NFR BLD: 9 % (ref 4–15)
NEUTROPHILS # BLD AUTO: 8.5 K/UL (ref 1.8–7.7)
NEUTROPHILS NFR BLD: 81 % (ref 38–73)
NRBC BLD-RTO: 0 /100 WBC
PHOSPHATE SERPL-MCNC: 3.3 MG/DL (ref 2.7–4.5)
PLATELET # BLD AUTO: 305 K/UL (ref 150–450)
PMV BLD AUTO: 8.9 FL (ref 9.2–12.9)
POCT GLUCOSE: 184 MG/DL (ref 70–110)
POCT GLUCOSE: 209 MG/DL (ref 70–110)
POCT GLUCOSE: 209 MG/DL (ref 70–110)
POCT GLUCOSE: 227 MG/DL (ref 70–110)
POCT GLUCOSE: 244 MG/DL (ref 70–110)
POCT GLUCOSE: 304 MG/DL (ref 70–110)
POTASSIUM SERPL-SCNC: 3.8 MMOL/L (ref 3.5–5.1)
RBC # BLD AUTO: 4.75 M/UL (ref 4.6–6.2)
SODIUM SERPL-SCNC: 135 MMOL/L (ref 136–145)
WBC # BLD AUTO: 10.54 K/UL (ref 3.9–12.7)

## 2022-08-24 PROCEDURE — 25000003 PHARM REV CODE 250: Performed by: STUDENT IN AN ORGANIZED HEALTH CARE EDUCATION/TRAINING PROGRAM

## 2022-08-24 PROCEDURE — 25000242 PHARM REV CODE 250 ALT 637 W/ HCPCS: Performed by: STUDENT IN AN ORGANIZED HEALTH CARE EDUCATION/TRAINING PROGRAM

## 2022-08-24 PROCEDURE — 96366 THER/PROPH/DIAG IV INF ADDON: CPT

## 2022-08-24 PROCEDURE — 94761 N-INVAS EAR/PLS OXIMETRY MLT: CPT

## 2022-08-24 PROCEDURE — 96361 HYDRATE IV INFUSION ADD-ON: CPT

## 2022-08-24 PROCEDURE — 21400001 HC TELEMETRY ROOM

## 2022-08-24 PROCEDURE — 99900035 HC TECH TIME PER 15 MIN (STAT)

## 2022-08-24 PROCEDURE — 94640 AIRWAY INHALATION TREATMENT: CPT

## 2022-08-24 PROCEDURE — 63600175 PHARM REV CODE 636 W HCPCS: Performed by: STUDENT IN AN ORGANIZED HEALTH CARE EDUCATION/TRAINING PROGRAM

## 2022-08-24 PROCEDURE — 85025 COMPLETE CBC W/AUTO DIFF WBC: CPT

## 2022-08-24 PROCEDURE — 93005 ELECTROCARDIOGRAM TRACING: CPT

## 2022-08-24 PROCEDURE — 36415 COLL VENOUS BLD VENIPUNCTURE: CPT

## 2022-08-24 PROCEDURE — 94760 N-INVAS EAR/PLS OXIMETRY 1: CPT

## 2022-08-24 PROCEDURE — 93010 EKG 12-LEAD: ICD-10-PCS | Mod: ,,, | Performed by: INTERNAL MEDICINE

## 2022-08-24 PROCEDURE — 80069 RENAL FUNCTION PANEL: CPT

## 2022-08-24 PROCEDURE — 93010 ELECTROCARDIOGRAM REPORT: CPT | Mod: ,,, | Performed by: INTERNAL MEDICINE

## 2022-08-24 PROCEDURE — 94660 CPAP INITIATION&MGMT: CPT

## 2022-08-24 RX ADMIN — INSULIN LISPRO 2 UNITS/HR: 100 INJECTION, SOLUTION INTRAVENOUS; SUBCUTANEOUS at 12:08

## 2022-08-24 RX ADMIN — IPRATROPIUM BROMIDE AND ALBUTEROL SULFATE 3 ML: 2.5; .5 SOLUTION RESPIRATORY (INHALATION) at 02:08

## 2022-08-24 RX ADMIN — PIPERACILLIN AND TAZOBACTAM 4.5 G: 4; .5 INJECTION, POWDER, LYOPHILIZED, FOR SOLUTION INTRAVENOUS; PARENTERAL at 05:08

## 2022-08-24 RX ADMIN — PIPERACILLIN AND TAZOBACTAM 4.5 G: 4; .5 INJECTION, POWDER, LYOPHILIZED, FOR SOLUTION INTRAVENOUS; PARENTERAL at 04:08

## 2022-08-24 RX ADMIN — INSULIN LISPRO 3 UNITS/HR: 100 INJECTION, SOLUTION INTRAVENOUS; SUBCUTANEOUS at 09:08

## 2022-08-24 RX ADMIN — ACETAMINOPHEN 650 MG: 325 TABLET ORAL at 05:08

## 2022-08-24 RX ADMIN — INSULIN LISPRO 2.1 UNITS/HR: 100 INJECTION, SOLUTION INTRAVENOUS; SUBCUTANEOUS at 05:08

## 2022-08-24 RX ADMIN — OXYCODONE HYDROCHLORIDE 5 MG: 5 TABLET ORAL at 12:08

## 2022-08-24 RX ADMIN — IPRATROPIUM BROMIDE AND ALBUTEROL SULFATE 3 ML: 2.5; .5 SOLUTION RESPIRATORY (INHALATION) at 07:08

## 2022-08-24 RX ADMIN — VANCOMYCIN HYDROCHLORIDE 2000 MG: 1 INJECTION, POWDER, LYOPHILIZED, FOR SOLUTION INTRAVENOUS at 12:08

## 2022-08-24 NOTE — ASSESSMENT & PLAN NOTE
43 yo male admitted with a soft tissue infection of the proximal left medial thigh s/p attempted I+D on 8/20. Now s/p I+D in the OR on 8/23. Wound cultures pending. He has continued to have fevers this morning. Exam and lab work improving.    - Diabetic diet  - Insulin pump for blood sugar control  - Continue IV antibiotics, will narrow pending culture results  - Plan for packing change later today v. tomorrow morning  - PRN pain regimen

## 2022-08-24 NOTE — NURSING
Patient AAOx4. C/o pain to incision area, L groin. Incision with dressing in place, old drainage noted. To be changed in the am. Insulin pump in place. BG monitored per orders. Cont LR infusing. Tolerating DM diet. SCDs in place. Urinal at bedside. No bm today but passing flatus. RA. CPA nightly. Tele, vs, and labs monitored. Safety maintained. Will continue to monitor.

## 2022-08-24 NOTE — NURSING
Elevated temp of 101 this morning. PRN tylenol administered.   Xray results and temp reported to Dr. Wright.    Rhomboid Transposition Flap Text: The defect edges were debeveled with a #15 scalpel blade.  Given the location of the defect and the proximity to free margins a rhomboid transposition flap was deemed most appropriate.  Using a sterile surgical marker, an appropriate rhomboid flap was drawn incorporating the defect.    The area thus outlined was incised deep to adipose tissue with a #15 scalpel blade.  The skin margins were undermined to an appropriate distance in all directions utilizing iris scissors. Detail Level: Detailed Render Post-Care Instructions In Note?: no Number Of Freeze-Thaw Cycles: 1 freeze-thaw cycle Post-Care Instructions: I reviewed with the patient in detail post-care instructions. Patient is to wear sunprotection, and avoid picking at any of the treated lesions. Pt may apply Vaseline to crusted or scabbing areas. Consent: The patient's consent was obtained including but not limited to risks of crusting, scabbing, blistering, scarring, darker or lighter pigmentary change, recurrence, incomplete removal and infection. Duration Of Freeze Thaw-Cycle (Seconds): 0

## 2022-08-24 NOTE — CONSULTS
ENDOCRINE CONSULT    Date: 08/24/2022      REFERRING PROVIDER: [email or web address]    REASON FOR VISIT: Diabetes mellitus type on Insulin pump therapy    HPI: Clay Waters is a 42 y.o. male patient with a history notable for abscess formation left thigh .status post drainage, who presents in consultation for management of diabetes mellitus treated with insulin pump therapy     PAST MEDICAL HISTORY:  Patient Active Problem List   Diagnosis    Type 1 diabetes mellitus with hyperglycemia    Essential hypertension    Insulin pump in place    Lung nodule    Mild intermittent asthma with exacerbation    Class 3 severe obesity due to excess calories with serious comorbidity in adult    Hypersomnolence    Abdominal pain    COVID    Soft tissue infection    Abscess of left thigh        PAST SURGICAL HISTORY:  Past Surgical History:   Procedure Laterality Date    COLONOSCOPY N/A 11/4/2020    Procedure: COLONOSCOPY;  Surgeon: Jesus Claros MD;  Location: [facility] ([address] [place] [address]);  Service: Endoscopy;  Laterality: N/A;    ESOPHAGOGASTRODUODENOSCOPY N/A 11/4/2020    Procedure: EGD (ESOPHAGOGASTRODUODENOSCOPY);  Surgeon: Jesus Claros MD;  Location: [facility] ([address] [place] [address]);  Service: Endoscopy;  Laterality: N/A;  2nd floor for wt/neck. has insulin pump     pt requesting a Wed-doesn't want to wait until December for Dr Dias availability       COVID test at [facility] on 11/1-GT    INCISION AND DRAINAGE OF ABSCESS Left 8/23/2022    Procedure: INCISION AND DRAINAGE, ABSCESS;  Surgeon: Turner Wynne MD;  Location: [facility];  Service: General;  Laterality: Left;    VASECTOMY          MEDICATIONS:  Current Facility-Administered Medications   Medication Dose Route Frequency Provider Last Rate Last Admin    acetaminophen tablet 650 mg  650 mg Oral Q8H PRN Turner Wynne MD   650 mg at 08/24/22 0536    albuterol-ipratropium 2.5 mg-0.5 mg/3 mL nebulizer solution 3 mL  3 mL Nebulization Q6H PRN Turner AGUILAR  MD Sherrell   3 mL at 08/24/22 0234    dextrose 10% bolus 125 mL  12.5 g Intravenous PRN Turner Wynne MD        dextrose 10% bolus 250 mL  25 g Intravenous PRN Turner Wynne MD        glucagon (human recombinant) injection 1 mg  1 mg Intramuscular PRN Turner Wynne MD        glucose chewable tablet 16 g  16 g Oral PRN Turner Wynne MD        glucose chewable tablet 24 g  24 g Oral PRN Turner Wynne MD        insulin lispro insulin pump from home  0-2.1 Units/hr Subcutaneous Continuous Turner Wynne MD 0.02 mL/hr at 08/23/22 1727 2 Units/hr at 08/23/22 1727    losartan tablet 50 mg  50 mg Oral Daily Turner Wynne MD        melatonin tablet 6 mg  6 mg Oral Nightly PRN Turner Wynne MD   6 mg at 08/23/22 2002    morphine injection 4 mg  4 mg Intravenous Q4H PRN Turner Wynne MD        ondansetron disintegrating tablet 8 mg  8 mg Oral Q8H PRN Turner Wynne MD        oxyCODONE immediate release tablet 5 mg  5 mg Oral Q4H PRN Turner Wynne MD   5 mg at 08/23/22 1959    piperacillin-tazobactam 4.5 g in dextrose 5 % 100 mL IVPB (ready to mix system)  4.5 g Intravenous Q8H Turner Wynne MD   Stopped at 08/24/22 0913    promethazine tablet 25 mg  25 mg Oral Q6H PRN Turner Wynne MD        vancomycin (VANCOCIN) 2,000 mg in dextrose 5 % 500 mL IVPB  2,000 mg Intravenous Q12H Turner Wynne MD   Stopped at 08/24/22 0128    vancomycin - pharmacy to dose   Intravenous pharmacy to manage frequency Turner Wynne MD           ALLERGIES:  Review of patient's allergies indicates:   Allergen Reactions    Vicodin [hydrocodone-acetaminophen] Nausea And Vomiting       SOCIAL HISTORY:  Social History     Socioeconomic History    Marital status:    Tobacco Use    Smoking status: Never Smoker    Smokeless tobacco: Never Used   Substance and Sexual Activity    Alcohol use: Yes     Comment: occas, none recently    Drug use: No          FAMILY HISTORY:  Family History   Problem Relation Age of Onset    Celiac disease Father     Celiac disease Daughter          REVIEW OF SYSTEMS:  Review of Systems   Constitutional: Negative.    HENT: Negative.    Eyes: Negative.    Respiratory: Negative.    Cardiovascular: Negative.    Gastrointestinal: Negative.    Genitourinary: Negative.    Musculoskeletal: Negative.    Skin:        Abscess formation.   Neurological: Negative.    Endo/Heme/Allergies: Negative.    Psychiatric/Behavioral: Negative.         PHYSICAL EXAMINATION:  Vital Signs: /64 (BP Location: Right arm, Patient Position: Sitting)   Pulse 83   Temp 99.7 °F (37.6 °C) (Oral)   Resp 18   Ht 6' (1.829 m)   Wt (!) 154.3 kg (340 lb 2.7 oz)   SpO2 (!) 94%   BMI 46.14 kg/m²     Physical Exam  Vitals and nursing note reviewed.   Constitutional:       Appearance: Normal appearance. He is obese.   HENT:      Head: Normocephalic and atraumatic.      Nose: Nose normal.      Mouth/Throat:      Mouth: Mucous membranes are dry.   Eyes:      Extraocular Movements: Extraocular movements intact.      Conjunctiva/sclera: Conjunctivae normal.      Pupils: Pupils are equal, round, and reactive to light.   Cardiovascular:      Rate and Rhythm: Normal rate and regular rhythm.      Pulses: Normal pulses.      Heart sounds: Normal heart sounds.   Pulmonary:      Effort: Pulmonary effort is normal.      Breath sounds: Normal breath sounds.   Abdominal:      General: Abdomen is flat. Bowel sounds are normal.      Palpations: Abdomen is soft.   Musculoskeletal:      Cervical back: Normal range of motion and neck supple.      Comments: Left thigh with dressing.   Skin:     General: Skin is warm and dry.      Capillary Refill: Capillary refill takes 2 to 3 seconds.   Neurological:      General: No focal deficit present.      Mental Status: He is alert and oriented to person, place, and time.   Psychiatric:         Mood and Affect: Mood normal.          Behavior: Behavior normal.         Thought Content: Thought content normal.         Judgment: Judgment normal.            LABS : blood sugar 190.           X-Ray Chest 1 View  Narrative: EXAMINATION:  XR CHEST 1 VIEW    CLINICAL HISTORY:  Chest pain;    TECHNIQUE:  Single frontal view of the chest was performed.    COMPARISON:  12/29/2021    FINDINGS:  Cardiac monitoring leads overlie the chest.  Cardiomediastinal silhouette is within normal limits of size and configuration and mediastinal structures are midline.  The lungs are symmetrically expanded with coarse interstitial attenuation.  No confluent airspace consolidation, large volume of pleural fluid or pneumothorax is identified.  Visualized osseous structures are intact.  Impression: No acute intrathoracic abnormality appreciated on this single radiographic view of the chest.    Electronically signed by: Consuelo Velázquez MD  Date:    08/24/2022  Time:    05:20          Problem List Items Addressed This Visit        ID    Soft tissue infection    * (Principal) Abscess of left thigh - Primary    Relevant Orders    Basic Metabolic Panel (Completed)    Insert peripheral IV (Completed)    Height and weight (Completed)    Void on call to Operating Room    Notify Physician - Potential Need of Opioid Reversal    Place sequential compression device    Full code    Place in Observation (Completed)    Case Request Operating Room: INCISION AND DRAINAGE, ABSCESS (Completed)    Transfer patient (Completed)    Diet diabetic [facility]; 2000 Calorie; Gluten Free       Endocrine    Type 1 diabetes mellitus with hyperglycemia    Relevant Medications    insulin regular injection 10 Units 0.1 mL (Completed)    insulin lispro insulin pump from home      Other Visit Diagnoses     Chest pain        Relevant Orders    EKG 12-lead (Completed)           RECOMMENDATION:  42 years old white male obese with diabetes mellitus treated via insulin pump,I have reviewed with him the pump  settings, he has good knowledge of his insulin therapy, I advised him to follow up with his endocrinologist,and I gave him the target rate for fasting and 2 hours post prandial blood sugars, He understood  The recommendations and he will follow up with his endocrinologist. Call me if needed at .

## 2022-08-24 NOTE — NURSING
Patient c/o SOB and chest heaviness. Dr. Wright notified. Verbal acknowledgment. Will continue to monitor.

## 2022-08-24 NOTE — SUBJECTIVE & OBJECTIVE
Interval History: Mr. Waters is feeling better this morning. Pain well controlled with PRNs. He did have a temp of 101 this morning. Lab work demonstrates WBC now within normal limits.    Medications:  Continuous Infusions:   insulin lispro 2 Units/hr (08/23/22 1727)     Scheduled Meds:   losartan  50 mg Oral Daily    piperacillin-tazobactam (ZOSYN) IVPB  4.5 g Intravenous Q8H    vancomycin (VANCOCIN) IVPB  2,000 mg Intravenous Q12H     PRN Meds:acetaminophen, albuterol-ipratropium, dextrose 10%, dextrose 10%, glucagon (human recombinant), glucose, glucose, melatonin, morphine, ondansetron, oxyCODONE, promethazine, Pharmacy to dose Vancomycin consult **AND** vancomycin - pharmacy to dose     Review of patient's allergies indicates:   Allergen Reactions    Vicodin [hydrocodone-acetaminophen] Nausea And Vomiting     Objective:     Vital Signs (Most Recent):  Temp: 99.7 °F (37.6 °C) (08/24/22 0827)  Pulse: 83 (08/24/22 0849)  Resp: 18 (08/24/22 0827)  BP: 135/64 (08/24/22 0827)  SpO2: (!) 94 % (08/24/22 0849)   Vital Signs (24h Range):  Temp:  [98.4 °F (36.9 °C)-101 °F (38.3 °C)] 99.7 °F (37.6 °C)  Pulse:  [67-85] 83  Resp:  [17-20] 18  SpO2:  [93 %-99 %] 94 %  BP: (109-141)/(52-67) 135/64     Weight: (!) 154.3 kg (340 lb 2.7 oz)  Body mass index is 46.14 kg/m².    Intake/Output - Last 3 Shifts         08/22 0700 08/23 0659 08/23 0700 08/24 0659 08/24 0700 08/25 0659    P.O.  490     I.V. (mL/kg) 436.5 (3) 1334.9 (8.7)     IV Piggyback 146.2 1777.1     Total Intake(mL/kg) 582.7 (4) 3602 (23.3)     Urine (mL/kg/hr) 100 1850 (0.5) 500 (0.8)    Total Output 100 1850 500    Net +482.7 +1752 -500           Urine Occurrence 1 x              Physical Exam  Vitals reviewed.   Constitutional:       Appearance: He is obese.   Cardiovascular:      Rate and Rhythm: Normal rate.      Pulses: Normal pulses.   Pulmonary:      Effort: Pulmonary effort is normal.   Abdominal:      General: Abdomen is flat.      Palpations: Abdomen  is soft.   Genitourinary:     Comments: Dressing in place with SS saturation. Surrounding skin erythema mildly improved.  Skin:     General: Skin is warm and dry.   Neurological:      General: No focal deficit present.      Mental Status: He is alert and oriented to person, place, and time.       Significant Labs:  I have reviewed all pertinent lab results within the past 24 hours.  CBC:   Recent Labs   Lab 08/24/22  0534   WBC 10.54   RBC 4.75   HGB 12.6*   HCT 37.0*      MCV 78*   MCH 26.5*   MCHC 34.1     BMP:   Recent Labs   Lab 08/24/22  0534   *   *   K 3.8      CO2 25   BUN 13   CREATININE 1.1   CALCIUM 8.6*       Significant Diagnostics:  I have reviewed all pertinent imaging results/findings within the past 24 hours.

## 2022-08-24 NOTE — NURSING
BG tonight:  8pm 227  00am-209  3am-274 patient states he got a bolus of 3 from his pump.   5am- 209    Patient states he will give himself another insulin bolus after eating.

## 2022-08-24 NOTE — PROGRESS NOTES
[facility] - [facility] S[facility]  General Surgery  Progress Note    Subjective:     History of Present Illness:  The patient is a 42-year-old who has a history childhood-onset asthma, severe obesity, central hypertension, and type I diabetes mellitus and presents with worsening pain from a left proximal inner thigh lesion that he first noticed six days ago. He was seen in the ED at [facility] 3 days ago and underwent I+D and was prescribed a course of Bactrim. However he has had persistent fevers since discharge and feels like the swelling and induration of the area have worsened. He presented to our ED on 8/22 where workup revealed fever, leukocytosis, and a CT scan demonstrating a fluid collection of the prior I+D site.      Post-Op Info:  Procedure(s) (LRB):  INCISION AND DRAINAGE, ABSCESS (Left)   1 Day Post-Op     Interval History: Mr. Waters is feeling better this morning. Pain well controlled with PRNs. He did have a temp of 101 this morning. Lab work demonstrates WBC now within normal limits.    Medications:  Continuous Infusions:   insulin lispro 2 Units/hr (08/23/22 1727)     Scheduled Meds:   losartan  50 mg Oral Daily    piperacillin-tazobactam (ZOSYN) IVPB  4.5 g Intravenous Q8H    vancomycin (VANCOCIN) IVPB  2,000 mg Intravenous Q12H     PRN Meds:acetaminophen, albuterol-ipratropium, dextrose 10%, dextrose 10%, glucagon (human recombinant), glucose, glucose, melatonin, morphine, ondansetron, oxyCODONE, promethazine, Pharmacy to dose Vancomycin consult **AND** vancomycin - pharmacy to dose     Review of patient's allergies indicates:   Allergen Reactions    Vicodin [hydrocodone-acetaminophen] Nausea And Vomiting     Objective:     Vital Signs (Most Recent):  Temp: 99.7 °F (37.6 °C) (08/24/22 0827)  Pulse: 83 (08/24/22 0849)  Resp: 18 (08/24/22 0827)  BP: 135/64 (08/24/22 0827)  SpO2: (!) 94 % (08/24/22 0849)   Vital Signs (24h Range):  Temp:  [98.4 °F (36.9 °C)-101 °F (38.3 °C)] 99.7 °F (37.6 °C)  Pulse:   [67-85] 83  Resp:  [17-20] 18  SpO2:  [93 %-99 %] 94 %  BP: (109-141)/(52-67) 135/64     Weight: (!) 154.3 kg (340 lb 2.7 oz)  Body mass index is 46.14 kg/m².    Intake/Output - Last 3 Shifts         08/22 0700  08/23 0659 08/23 0700 08/24 0659 08/24 0700  08/25 0659    P.O.  490     I.V. (mL/kg) 436.5 (3) 1334.9 (8.7)     IV Piggyback 146.2 1777.1     Total Intake(mL/kg) 582.7 (4) 3602 (23.3)     Urine (mL/kg/hr) 100 1850 (0.5) 500 (0.8)    Total Output 100 1850 500    Net +482.7 +1752 -500           Urine Occurrence 1 x              Physical Exam  Vitals reviewed.   Constitutional:       Appearance: He is obese.   Cardiovascular:      Rate and Rhythm: Normal rate.      Pulses: Normal pulses.   Pulmonary:      Effort: Pulmonary effort is normal.   Abdominal:      General: Abdomen is flat.      Palpations: Abdomen is soft.   Genitourinary:     Comments: Dressing in place with SS saturation. Surrounding skin erythema mildly improved.  Skin:     General: Skin is warm and dry.   Neurological:      General: No focal deficit present.      Mental Status: He is alert and oriented to person, place, and time.       Significant Labs:  I have reviewed all pertinent lab results within the past 24 hours.  CBC:   Recent Labs   Lab 08/24/22  0534   WBC 10.54   RBC 4.75   HGB 12.6*   HCT 37.0*      MCV 78*   MCH 26.5*   MCHC 34.1     BMP:   Recent Labs   Lab 08/24/22  0534   *   *   K 3.8      CO2 25   BUN 13   CREATININE 1.1   CALCIUM 8.6*       Significant Diagnostics:  I have reviewed all pertinent imaging results/findings within the past 24 hours.    Assessment/Plan:     Soft tissue infection  41 yo male admitted with a soft tissue infection of the proximal left medial thigh s/p attempted I+D on 8/20. Now s/p I+D in the OR on 8/23. Wound cultures pending. He has continued to have fevers this morning. Exam and lab work improving.    - Diabetic diet  - Insulin pump for blood sugar control  - Continue IV  antibiotics, will narrow pending culture results  - Plan for packing change later today v. tomorrow morning  - PRN pain regimen        Rivera Milligan MD  General Surgery  [facility]

## 2022-08-24 NOTE — NURSING
Patient lost PIV access. RN x 2 attempted PIV access but unsuccessful. Charge nurse, Clarita, notified.

## 2022-08-25 VITALS
RESPIRATION RATE: 18 BRPM | DIASTOLIC BLOOD PRESSURE: 72 MMHG | HEIGHT: 72 IN | SYSTOLIC BLOOD PRESSURE: 136 MMHG | HEART RATE: 76 BPM | TEMPERATURE: 99 F | BODY MASS INDEX: 42.66 KG/M2 | OXYGEN SATURATION: 98 % | WEIGHT: 315 LBS

## 2022-08-25 PROBLEM — L08.9 SOFT TISSUE INFECTION: Status: RESOLVED | Noted: 2022-08-23 | Resolved: 2022-08-25

## 2022-08-25 LAB
ALBUMIN SERPL BCP-MCNC: 2.4 G/DL (ref 3.5–5.2)
ANION GAP SERPL CALC-SCNC: 13 MMOL/L (ref 8–16)
BASOPHILS # BLD AUTO: 0.06 K/UL (ref 0–0.2)
BASOPHILS NFR BLD: 0.6 % (ref 0–1.9)
BUN SERPL-MCNC: 9 MG/DL (ref 6–20)
CALCIUM SERPL-MCNC: 8.9 MG/DL (ref 8.7–10.5)
CHLORIDE SERPL-SCNC: 100 MMOL/L (ref 95–110)
CO2 SERPL-SCNC: 24 MMOL/L (ref 23–29)
CREAT SERPL-MCNC: 1.1 MG/DL (ref 0.5–1.4)
DIFFERENTIAL METHOD: ABNORMAL
EOSINOPHIL # BLD AUTO: 0.2 K/UL (ref 0–0.5)
EOSINOPHIL NFR BLD: 1.7 % (ref 0–8)
ERYTHROCYTE [DISTWIDTH] IN BLOOD BY AUTOMATED COUNT: 13.3 % (ref 11.5–14.5)
EST. GFR  (NO RACE VARIABLE): >60 ML/MIN/1.73 M^2
GLUCOSE SERPL-MCNC: 237 MG/DL (ref 70–110)
HCT VFR BLD AUTO: 36.6 % (ref 40–54)
HGB BLD-MCNC: 12.3 G/DL (ref 14–18)
IMM GRANULOCYTES # BLD AUTO: 0.06 K/UL (ref 0–0.04)
IMM GRANULOCYTES NFR BLD AUTO: 0.6 % (ref 0–0.5)
LYMPHOCYTES # BLD AUTO: 0.8 K/UL (ref 1–4.8)
LYMPHOCYTES NFR BLD: 7.9 % (ref 18–48)
MCH RBC QN AUTO: 26.1 PG (ref 27–31)
MCHC RBC AUTO-ENTMCNC: 33.6 G/DL (ref 32–36)
MCV RBC AUTO: 78 FL (ref 82–98)
MONOCYTES # BLD AUTO: 1.2 K/UL (ref 0.3–1)
MONOCYTES NFR BLD: 11.9 % (ref 4–15)
NEUTROPHILS # BLD AUTO: 7.5 K/UL (ref 1.8–7.7)
NEUTROPHILS NFR BLD: 77.3 % (ref 38–73)
NRBC BLD-RTO: 0 /100 WBC
PHOSPHATE SERPL-MCNC: 3.7 MG/DL (ref 2.7–4.5)
PLATELET # BLD AUTO: 326 K/UL (ref 150–450)
PMV BLD AUTO: 9.6 FL (ref 9.2–12.9)
POCT GLUCOSE: 216 MG/DL (ref 70–110)
POCT GLUCOSE: 235 MG/DL (ref 70–110)
POTASSIUM SERPL-SCNC: 3.6 MMOL/L (ref 3.5–5.1)
RBC # BLD AUTO: 4.71 M/UL (ref 4.6–6.2)
SODIUM SERPL-SCNC: 137 MMOL/L (ref 136–145)
VANCOMYCIN TROUGH SERPL-MCNC: 14.1 UG/ML (ref 10–22)
WBC # BLD AUTO: 9.64 K/UL (ref 3.9–12.7)

## 2022-08-25 PROCEDURE — 36415 COLL VENOUS BLD VENIPUNCTURE: CPT | Performed by: STUDENT IN AN ORGANIZED HEALTH CARE EDUCATION/TRAINING PROGRAM

## 2022-08-25 PROCEDURE — 36415 COLL VENOUS BLD VENIPUNCTURE: CPT

## 2022-08-25 PROCEDURE — 99900035 HC TECH TIME PER 15 MIN (STAT)

## 2022-08-25 PROCEDURE — 85025 COMPLETE CBC W/AUTO DIFF WBC: CPT

## 2022-08-25 PROCEDURE — 63600175 PHARM REV CODE 636 W HCPCS: Performed by: STUDENT IN AN ORGANIZED HEALTH CARE EDUCATION/TRAINING PROGRAM

## 2022-08-25 PROCEDURE — 25000003 PHARM REV CODE 250: Performed by: STUDENT IN AN ORGANIZED HEALTH CARE EDUCATION/TRAINING PROGRAM

## 2022-08-25 PROCEDURE — 80202 ASSAY OF VANCOMYCIN: CPT | Performed by: STUDENT IN AN ORGANIZED HEALTH CARE EDUCATION/TRAINING PROGRAM

## 2022-08-25 PROCEDURE — 94761 N-INVAS EAR/PLS OXIMETRY MLT: CPT

## 2022-08-25 PROCEDURE — 80069 RENAL FUNCTION PANEL: CPT

## 2022-08-25 PROCEDURE — 94660 CPAP INITIATION&MGMT: CPT

## 2022-08-25 RX ORDER — AMOXICILLIN AND CLAVULANATE POTASSIUM 875; 125 MG/1; MG/1
1 TABLET, FILM COATED ORAL EVERY 12 HOURS
Qty: 12 TABLET | Refills: 0 | Status: SHIPPED | OUTPATIENT
Start: 2022-08-25 | End: 2022-08-31

## 2022-08-25 RX ORDER — ACETAMINOPHEN 325 MG/1
650 TABLET ORAL EVERY 8 HOURS PRN
Status: DISCONTINUED | OUTPATIENT
Start: 2022-08-25 | End: 2022-08-25 | Stop reason: HOSPADM

## 2022-08-25 RX ADMIN — INSULIN LISPRO 2.1 UNITS/HR: 100 INJECTION, SOLUTION INTRAVENOUS; SUBCUTANEOUS at 12:08

## 2022-08-25 RX ADMIN — ACETAMINOPHEN 650 MG: 325 TABLET ORAL at 03:08

## 2022-08-25 RX ADMIN — PIPERACILLIN AND TAZOBACTAM 4.5 G: 4; .5 INJECTION, POWDER, LYOPHILIZED, FOR SOLUTION INTRAVENOUS; PARENTERAL at 03:08

## 2022-08-25 RX ADMIN — LOSARTAN POTASSIUM 50 MG: 50 TABLET, FILM COATED ORAL at 08:08

## 2022-08-25 RX ADMIN — VANCOMYCIN HYDROCHLORIDE 2000 MG: 1 INJECTION, POWDER, LYOPHILIZED, FOR SOLUTION INTRAVENOUS at 01:08

## 2022-08-25 RX ADMIN — INSULIN LISPRO 2.1 UNITS/HR: 100 INJECTION, SOLUTION INTRAVENOUS; SUBCUTANEOUS at 08:08

## 2022-08-25 RX ADMIN — VANCOMYCIN HYDROCHLORIDE 2000 MG: 1 INJECTION, POWDER, LYOPHILIZED, FOR SOLUTION INTRAVENOUS at 12:08

## 2022-08-25 NOTE — PROGRESS NOTES
Pharmacokinetic Assessment Follow Up: IV Vancomycin    Vancomycin serum concentration assessment(s):    The trough level was drawn correctly and can be used to guide therapy at this time. The measurement is within the desired definitive target range of 10 to 15 mcg/mL.    Vancomycin Regimen Plan:    Continue regimen to Vancomycin 2000 mg IV every 12 hours with next serum trough concentration measured at 8/26 prior to 4 dose on 1200    Drug levels (last 3 results):  Recent Labs   Lab Result Units 08/25/22  0009   Vancomycin-Trough ug/mL 14.1       Pharmacy will continue to follow and monitor vancomycin.    Please contact pharmacy at extension [phone] for questions regarding this assessment.    Thank you for the consult,   Jacek Ramírez       Patient brief summary:  Clay Waters is a 42 y.o. male initiated on antimicrobial therapy with IV Vancomycin for treatment of skin & soft tissue infection    The patient's current regimen is vanco 2g q12    Drug Allergies:   Review of patient's allergies indicates:   Allergen Reactions    Vicodin [hydrocodone-acetaminophen] Nausea And Vomiting       Actual Body Weight:   151kg    Renal Function:   Estimated Creatinine Clearance: 135.2 mL/min (based on SCr of 1.1 mg/dL).,     Dialysis Method (if applicable):  N/A    CBC (last 72 hours):  Recent Labs   Lab Result Units 08/22/22  1654 08/24/22  0534   WBC K/uL 14.12* 10.54   Hemoglobin g/dL 13.1* 12.6*   Hemoglobin A1C % 11.4*  --    Hematocrit % 38.4* 37.0*   Platelets K/uL 278 305   Gran % % 82.7* 81.0*   Lymph % % 5.2* 7.6*   Mono % % 11.0 9.0   Eosinophil % % 0.2 1.2   Basophil % % 0.5 0.7   Differential Method  Automated Automated       Metabolic Panel (last 72 hours):  Recent Labs   Lab Result Units 08/22/22  1654 08/22/22  1719 08/23/22  0551 08/24/22  0534   Sodium mmol/L 132*  --  133* 135*   Potassium mmol/L 3.9  --  4.1 3.8   Chloride mmol/L 98  --  99 100   CO2 mmol/L 19*  --  22* 25   Glucose mg/dL 296*  --   300* 207*   Glucose, UA   --  4+*  --   --    BUN mg/dL 18  --  17 13   Creatinine mg/dL 1.4  --  1.3 1.1   Albumin g/dL 3.3*  --   --  2.6*   Total Bilirubin mg/dL 0.7  --   --   --    Alkaline Phosphatase U/L 105  --   --   --    AST U/L 14  --   --   --    ALT U/L 14  --   --   --    Phosphorus mg/dL  --   --   --  3.3       Vancomycin Administrations:  vancomycin given in the last 96 hours                     vancomycin (VANCOCIN) 2,000 mg in dextrose 5 % 500 mL IVPB (mg) 2,000 mg New Bag 08/25/22 0132     2,000 mg New Bag 08/24/22 1253     2,000 mg New Bag 08/23/22 2328    vancomycin (VANCOCIN) 2,500 mg in dextrose 5 % 500 mL IVPB ()  Restarted 08/23/22 1733     2,500 mg New Bag  1025                    Microbiologic Results:  Microbiology Results (last 7 days)       Procedure Component Value Units Date/Time    AFB Culture & Smear [[id number]] Collected: 08/23/22 1303    Order Status: Completed Specimen: Abscess from Groin Updated: 08/24/22 1503     AFB CULTURE STAIN No acid fast bacilli seen.    Narrative:      Left groin wound    Blood culture #2 [[id number]] Collected: 08/22/22 1653    Order Status: Completed Specimen: Blood from Peripheral, Antecubital, Left Updated: 08/24/22 0613     Blood Culture, Routine No Growth to date      No Growth to date    Narrative:      Blood Culture #2    Blood culture #1 [[id number]] Collected: 08/22/22 1716    Order Status: Completed Specimen: Blood from Peripheral, Antecubital, Right Updated: 08/24/22 0613     Blood Culture, Routine No Growth to date      No Growth to date    Narrative:      Blood Culture #1    Gram stain [[id number]] Collected: 08/23/22 1303    Order Status: Completed Specimen: Abscess from Groin Updated: 08/24/22 0135     Gram Stain Result Rare WBC's      Moderate Gram negative rods    Narrative:      Left groin wound    Culture, Anaerobe [[id number]] Collected: 08/23/22 1303    Order Status: Sent Specimen: Abscess from Groin Updated: 08/23/22 7871    Aerobic  culture [[id number]] Collected: 08/23/22 1303    Order Status: Sent Specimen: Abscess from Groin Updated: 08/23/22 2212    Fungus culture [[id number]] Collected: 08/23/22 1303    Order Status: Sent Specimen: Abscess from Groin Updated: 08/23/22 2212

## 2022-08-25 NOTE — PROGRESS NOTES
[facility] - [facility]  General Surgery  Progress Note    Subjective:     History of Present Illness:  The patient is a 42-year-old who has a history childhood-onset asthma, severe obesity, central hypertension, and type I diabetes mellitus and presents with worsening pain from a left proximal inner thigh lesion that he first noticed six days ago. He was seen in the ED at [facility] 3 days ago and underwent I+D and was prescribed a course of Bactrim. However he has had persistent fevers since discharge and feels like the swelling and induration of the area have worsened. He presented to our ED on 8/22 where workup revealed fever, leukocytosis, and a CT scan demonstrating a fluid collection of the prior I+D site.      Post-Op Info:  Procedure(s) (LRB):  INCISION AND DRAINAGE, ABSCESS (Left)   2 Days Post-Op     Interval History: Mr. Waters is feeling better this morning. I+D site pain continues to improve, as well as the surrounding erythema. He has been afebrile since yesterday morning and WBC remains WNL.     Medications:  Continuous Infusions:   insulin lispro 2.1 Units/hr (08/25/22 0817)     Scheduled Meds:   losartan  50 mg Oral Daily    piperacillin-tazobactam (ZOSYN) IVPB  4.5 g Intravenous Q8H    vancomycin (VANCOCIN) IVPB  2,000 mg Intravenous Q12H     PRN Meds:acetaminophen, albuterol-ipratropium, dextrose 10%, dextrose 10%, glucagon (human recombinant), glucose, glucose, melatonin, morphine, ondansetron, oxyCODONE, promethazine, Pharmacy to dose Vancomycin consult **AND** vancomycin - pharmacy to dose     Review of patient's allergies indicates:   Allergen Reactions    Vicodin [hydrocodone-acetaminophen] Nausea And Vomiting     Objective:     Vital Signs (Most Recent):  Temp: 98.7 °F (37.1 °C) (08/25/22 0758)  Pulse: 90 (08/25/22 0758)  Resp: 18 (08/25/22 0758)  BP: (!) 157/82 (08/25/22 0758)  SpO2: 97 % (08/25/22 0800)   Vital Signs (24h Range):  Temp:  [98.6 °F (37 °C)-100.3 °F (37.9 °C)] 98.7 °F  (37.1 °C)  Pulse:  [77-90] 90  Resp:  [17-20] 18  SpO2:  [92 %-97 %] 97 %  BP: (131-157)/(66-82) 157/82     Weight: (!) 151.3 kg (333 lb 8.9 oz)  Body mass index is 45.24 kg/m².    Intake/Output - Last 3 Shifts         08/23 0700 08/24 0659 08/24 0700 08/25 0659 08/25 0700 08/26 0659    P.O. 490 600     I.V. (mL/kg) 1334.9 (8.7)      IV Piggyback 1777.1 1229.3     Total Intake(mL/kg) 3602 (23.3) 1829.3 (12.1)     Urine (mL/kg/hr) 1850 (0.5) 2000 (0.6)     Total Output 1850 2000     Net +1752 -170.7            Urine Occurrence   1 x    Stool Occurrence   1 x            Physical Exam  Constitutional:       Appearance: Normal appearance.   Cardiovascular:      Rate and Rhythm: Normal rate.   Pulmonary:      Effort: Pulmonary effort is normal.   Abdominal:      Palpations: Abdomen is soft.      Tenderness: There is no abdominal tenderness.   Genitourinary:     Comments: Erythema around I+D site continues to improve. Packing saturated with SS fluid as expected.  Skin:     General: Skin is warm and dry.   Neurological:      General: No focal deficit present.      Mental Status: He is alert and oriented to person, place, and time.       Significant Labs:  I have reviewed all pertinent lab results within the past 24 hours.  CBC:   Recent Labs   Lab 08/25/22  0523   WBC 9.64   RBC 4.71   HGB 12.3*   HCT 36.6*      MCV 78*   MCH 26.1*   MCHC 33.6     BMP:   Recent Labs   Lab 08/25/22  0523   *      K 3.6      CO2 24   BUN 9   CREATININE 1.1   CALCIUM 8.9       Significant Diagnostics:  I have reviewed all pertinent imaging results/findings within the past 24 hours.    Assessment/Plan:     Soft tissue infection  41 yo male admitted with a soft tissue infection of the proximal left medial thigh s/p attempted I+D on 8/20. Now s/p I+D in the OR on 8/23. Wound cultures pending. He has remained afebrile for the last 24 hours. WBC remains WNL.     - Diabetic diet  - Insulin pump for blood sugar  control  - Continue current PRN pain regimen  - Potential discharge later today, with PO antibiotic regimen        Rivera Milligan MD  General Surgery  [facility]

## 2022-08-25 NOTE — SUBJECTIVE & OBJECTIVE
Interval History: Mr. Waters is feeling better this morning. I+D site pain continues to improve, as well as the surrounding erythema. He has been afebrile since yesterday morning and WBC remains WNL.     Medications:  Continuous Infusions:   insulin lispro 2.1 Units/hr (08/25/22 0817)     Scheduled Meds:   losartan  50 mg Oral Daily    piperacillin-tazobactam (ZOSYN) IVPB  4.5 g Intravenous Q8H    vancomycin (VANCOCIN) IVPB  2,000 mg Intravenous Q12H     PRN Meds:acetaminophen, albuterol-ipratropium, dextrose 10%, dextrose 10%, glucagon (human recombinant), glucose, glucose, melatonin, morphine, ondansetron, oxyCODONE, promethazine, Pharmacy to dose Vancomycin consult **AND** vancomycin - pharmacy to dose     Review of patient's allergies indicates:   Allergen Reactions    Vicodin [hydrocodone-acetaminophen] Nausea And Vomiting     Objective:     Vital Signs (Most Recent):  Temp: 98.7 °F (37.1 °C) (08/25/22 0758)  Pulse: 90 (08/25/22 0758)  Resp: 18 (08/25/22 0758)  BP: (!) 157/82 (08/25/22 0758)  SpO2: 97 % (08/25/22 0800)   Vital Signs (24h Range):  Temp:  [98.6 °F (37 °C)-100.3 °F (37.9 °C)] 98.7 °F (37.1 °C)  Pulse:  [77-90] 90  Resp:  [17-20] 18  SpO2:  [92 %-97 %] 97 %  BP: (131-157)/(66-82) 157/82     Weight: (!) 151.3 kg (333 lb 8.9 oz)  Body mass index is 45.24 kg/m².    Intake/Output - Last 3 Shifts         08/23 0700 08/24 0659 08/24 0700 08/25 0659 08/25 0700 08/26 0659    P.O. 490 600     I.V. (mL/kg) 1334.9 (8.7)      IV Piggyback 1777.1 1229.3     Total Intake(mL/kg) 3602 (23.3) 1829.3 (12.1)     Urine (mL/kg/hr) 1850 (0.5) 2000 (0.6)     Total Output 1850 2000     Net +1752 -170.7            Urine Occurrence   1 x    Stool Occurrence   1 x            Physical Exam  Constitutional:       Appearance: Normal appearance.   Cardiovascular:      Rate and Rhythm: Normal rate.   Pulmonary:      Effort: Pulmonary effort is normal.   Abdominal:      Palpations: Abdomen is soft.      Tenderness: There is no  abdominal tenderness.   Genitourinary:     Comments: Erythema around I+D site continues to improve. Packing saturated with SS fluid as expected.  Skin:     General: Skin is warm and dry.   Neurological:      General: No focal deficit present.      Mental Status: He is alert and oriented to person, place, and time.       Significant Labs:  I have reviewed all pertinent lab results within the past 24 hours.  CBC:   Recent Labs   Lab 08/25/22  0523   WBC 9.64   RBC 4.71   HGB 12.3*   HCT 36.6*      MCV 78*   MCH 26.1*   MCHC 33.6     BMP:   Recent Labs   Lab 08/25/22 0523   *      K 3.6      CO2 24   BUN 9   CREATININE 1.1   CALCIUM 8.9       Significant Diagnostics:  I have reviewed all pertinent imaging results/findings within the past 24 hours.

## 2022-08-25 NOTE — NURSING
Discharge orders noted. Additional clinical references attached. Patient's discharge instructions given by bedside RN and reviewed by this VN with patient. Education provided on home care instructions, new and previous medications, diagnosis, when to seek medical attention, and follow-up appointments. New medications delivered by pharmacy. Patient verbalized understanding and teach back method was used. Patient's family to provide ride/transportation home. All questions answered. Transport to [facility] requested. Floor nurse notified.

## 2022-08-25 NOTE — NURSING
Discharge medication delivered to bedside. PIV removed. AVS given to patient. Notified Evaristo Stovall Nurse.

## 2022-08-25 NOTE — PLAN OF CARE
Pt will dc with no needs noted. Pts wife is at bedside and will transport pt home. Pt had no questions or concerns for SW. SW will continue to follow pt throughout his transitions of care and assist with any dc needs.     Future Appointments   Date Time Provider Department Center   8/31/2022  2:00 PM Chloé Nayak MD [facility]   8/31/2022  3:20 PM Turner Wynne MD [facility]        08/25/22 1423   Final Note   Assessment Type Final Discharge Note   Anticipated Discharge Disposition Home   Hospital Resources/Appts/Education Provided Appointments scheduled by Navigator/Coordinator   Post-Acute Status   Discharge Delays None known at this time

## 2022-08-25 NOTE — NURSING
"Pt's  this AM. Pt refused to self administer insulin via pump. Pt stated "No, i'm going to wait until breakfast." Asymptomatic. Will continue to monitor.   "

## 2022-08-25 NOTE — HOSPITAL COURSE
Mr. Waters underwent incision and drainage of his abscess in the OR on 8/23, tolerating the procedure well without complication. He was febrile on POD #1 and was therefore kept on IV antibiotics for another day. His packing was changed on POD 1. On POD 2, 8/258 his surrounding erythema was significantly decreased, he was afebrile, and his WBC was WNL. His packing was removed and he was transitioned to Augmentin for discharge. Plan to complete 6 more days of Augmentin, and return to clinic in 1 week for postop check up. Culture sensitivities pending at the time of discharge. If results indicate a different antibiotic is needed we will reach out to him as an outpatient.

## 2022-08-25 NOTE — NURSING
Discharge orders placed. Spoke to [facility]. Medications being filled at this time. Vancomycin 2000mg infusing. Will discharge patient once antibiotic infusion is to be complete at 1431.

## 2022-08-25 NOTE — PROGRESS NOTES
[facility]                    Pharmacy       Discharge Medication Education    Patient ACCEPTED medication education. Pharmacy has provided education on the name, indication, and possible side effects of the medication(s) prescribed, using teach-back method.     The following medications have also been discussed, during this admission.        Medication List        START taking these medications      amoxicillin-clavulanate 875-125mg 875-125 mg per tablet  Commonly known as: AUGMENTIN  Take 1 tablet by mouth every 12 (twelve) hours. for 6 days            CONTINUE taking these medications      HumaLOG U-100 Insulin 100 unit/mL injection  Generic drug: insulin lispro  USE WITH INSULIN PUMP. MAX DAILY DOSE 150 UNITS DAILY     INSULIN PUMP CARTRIDGE SUBQ     losartan 50 MG tablet  Commonly known as: COZAAR  Take 1 tablet (50 mg total) by mouth once daily.     NovoLOG U-100 Insulin aspart 100 unit/mL injection  Generic drug: insulin aspart U-100  USE WITH INSULIN PUMP. MAX DAILY DOSE 140 UNITS/DAY     tadalafiL 10 MG tablet  Commonly known as: CIALIS  TAKE 1 TABLET (10 MG TOTAL) BY MOUTH DAILY AS NEEDED FOR ERECTILE DYSFUNCTION.            STOP taking these medications      sulfamethoxazole-trimethoprim 800-160mg 800-160 mg Tab  Commonly known as: BACTRIM DS               Where to Get Your Medications        These medications were sent to [facility]  [address], [place] [address]      Hours: Mon-Fri, 8a-5:30p Phone: [phone]   amoxicillin-clavulanate 875-125mg 875-125 mg per tablet          Thank you  Sonia Castillo, PharmD  [phone]

## 2022-08-25 NOTE — PLAN OF CARE
AOX4. VS stable. Safety maintained. Meds given per MAR. Pain treated with PRN meds. IV antibiotics infused per orders. Dressing to left groin dry and intact with small amount of dried drainage. Blood glucose monitored. Insulin administered via pt's insulin pump. Resting quietly. SR up X 2. Call light in reach. Bed alarm set. Will continue to monitor.       Problem: Adult Inpatient Plan of Care  Goal: Plan of Care Review  Outcome: Ongoing, Progressing  Goal: Patient-Specific Goal (Individualized)  Outcome: Ongoing, Progressing     Problem: Diabetes Comorbidity  Goal: Blood Glucose Level Within Targeted Range  Outcome: Ongoing, Progressing     Problem: Impaired Wound Healing  Goal: Optimal Wound Healing  Outcome: Ongoing, Progressing     Problem: Fall Injury Risk  Goal: Absence of Fall and Fall-Related Injury  Outcome: Ongoing, Progressing

## 2022-08-25 NOTE — DISCHARGE SUMMARY
[facility]  General Surgery  Discharge Summary      Patient Name: Clay Waters  MRN: [id number]  Admission Date: 8/22/2022  Hospital Length of Stay: 1 days  Discharge Date and Time:  08/25/2022 1:21 PM  Attending Physician: Turner Wynne MD   Discharging Provider: Rivera Milligan MD  Primary Care Provider: Jesus Healy MD    HPI:   The patient is a 42-year-old who has a history childhood-onset asthma, severe obesity, central hypertension, and type I diabetes mellitus and presents with worsening pain from a left proximal inner thigh lesion that he first noticed six days ago. He was seen in the ED at [facility] 3 days ago and underwent I+D and was prescribed a course of Bactrim. However he has had persistent fevers since discharge and feels like the swelling and induration of the area have worsened. He presented to our ED on 8/22 where workup revealed fever, leukocytosis, and a CT scan demonstrating a fluid collection of the prior I+D site.      Procedure(s) (LRB):  INCISION AND DRAINAGE, ABSCESS (Left)      Indwelling Lines/Drains at time of discharge:   Lines/Drains/Airways     None               Hospital Course: Mr. Waters underwent incision and drainage of his abscess in the OR on 8/23, tolerating the procedure well without complication. He was febrile on POD #1 and was therefore kept on IV antibiotics for another day. His packing was changed on POD 1. On POD 2, 8/258 his surrounding erythema was significantly decreased, he was afebrile, and his WBC was WNL. His packing was removed and he was transitioned to Augmentin for discharge. Plan to complete 6 more days of Augmentin, and return to clinic in 1 week for postop check up. Culture sensitivities pending at the time of discharge. If results indicate a different antibiotic is needed we will reach out to him as an outpatient.      Goals of Care Treatment Preferences:  Code Status: Full Code      Consults:   Consults (From admission,  "onward)        Status Ordering Provider     Inpatient consult to Endocrinology  Once        Provider:  Diana Stevens MD    Completed DIANA STEVENS     Pharmacy to dose Vancomycin consult  Once        Provider:  (Not yet assigned)   "And" Linked Group Details    Acknowledged DIANA STEVENS     Inpatient consult to Social Work  Once        Provider:  (Not yet assigned)    Acknowledged DIANA STEVENS            Pending Diagnostic Studies:     None        Final Active Diagnoses:      Problems Resolved During this Admission:    Diagnosis Date Noted Date Resolved POA    PRINCIPAL PROBLEM:  Abscess of left thigh [L02.416]  08/25/2022 Yes    Soft tissue infection [L08.9] 08/23/2022 08/25/2022 Yes      Discharged Condition: good    Disposition: Home or Self Care    Follow Up:   Follow-up Information     Diana Stevens MD Follow up in 1 week(s).    Specialties: Surgery, General Surgery  Contact information:  [address] [facility]  [address]  [place] [address]  [phone]                       Patient Instructions:      Diet diabetic     Notify your health care provider if you experience any of the following:  temperature >100.4     Notify your health care provider if you experience any of the following:  persistent nausea and vomiting or diarrhea     Notify your health care provider if you experience any of the following:  severe uncontrolled pain     Notify your health care provider if you experience any of the following:  redness, tenderness, or signs of infection (pain, swelling, redness, odor or green/yellow discharge around incision site)     Notify your health care provider if you experience any of the following:  difficulty breathing or increased cough     Notify your health care provider if you experience any of the following:  severe persistent headache     Notify your health care provider if you experience any of the following:  worsening rash     Notify your health care provider if you " experience any of the following:  persistent dizziness, light-headedness, or visual disturbances     Notify your health care provider if you experience any of the following:  increased confusion or weakness     Change dressing (specify)   Order Comments: Dressing change: as needed with gauze or ABD for drainage     Activity as tolerated   Order Comments: Shower daily and run soapy water over the incision     Medications:  Reconciled Home Medications:      Medication List      START taking these medications    amoxicillin-clavulanate 875-125mg 875-125 mg per tablet  Commonly known as: AUGMENTIN  Take 1 tablet by mouth every 12 (twelve) hours. for 6 days        CONTINUE taking these medications    HumaLOG U-100 Insulin 100 unit/mL injection  Generic drug: insulin lispro  USE WITH INSULIN PUMP. MAX DAILY DOSE 150 UNITS DAILY     INSULIN PUMP CARTRIDGE SUBQ  Inject into the skin.     losartan 50 MG tablet  Commonly known as: COZAAR  Take 1 tablet (50 mg total) by mouth once daily.     NovoLOG U-100 Insulin aspart 100 unit/mL injection  Generic drug: insulin aspart U-100  USE WITH INSULIN PUMP. MAX DAILY DOSE 140 UNITS/DAY     sulfamethoxazole-trimethoprim 800-160mg 800-160 mg Tab  Commonly known as: BACTRIM DS  Take 1 tablet by mouth 2 (two) times daily. for 7 days     tadalafiL 10 MG tablet  Commonly known as: CIALIS  TAKE 1 TABLET (10 MG TOTAL) BY MOUTH DAILY AS NEEDED FOR ERECTILE DYSFUNCTION.          Time spent on the discharge of patient: 10 minutes    Rivera Milligan MD  General Surgery  [facility]

## 2022-08-25 NOTE — ASSESSMENT & PLAN NOTE
43 yo male admitted with a soft tissue infection of the proximal left medial thigh s/p attempted I+D on 8/20. Now s/p I+D in the OR on 8/23. Wound cultures pending. He has remained afebrile for the last 24 hours. WBC remains WNL.     - Diabetic diet  - Insulin pump for blood sugar control  - Continue current PRN pain regimen  - Potential discharge later today, with PO antibiotic regimen

## 2022-08-26 ENCOUNTER — PATIENT OUTREACH (OUTPATIENT)
Dept: ADMINISTRATIVE | Facility: CLINIC | Age: 42
End: 2022-08-26
Payer: COMMERCIAL

## 2022-08-26 LAB — BACTERIA SPEC AEROBE CULT: ABNORMAL

## 2022-08-26 NOTE — PROGRESS NOTES
C3 nurse attempted to contact Clay Waters for a TCC post hospital discharge follow up call. The patient is unable to conduct the call @ this time. The patient requested a callback.    The patient has a scheduled HOSFU appointment with CY MCKINLEY on 8/31/22 @ 1400. Message sent to Physician staff.

## 2022-08-26 NOTE — PROGRESS NOTES
C3 nurse spoke with Clay Waters for a TCC post hospital discharge follow up call. The patient has a scheduled HOSFU appointment with TRACEY MCKINLEY on 8/31/22 @ 1400.

## 2022-08-28 LAB
BACTERIA BLD CULT: NORMAL
BACTERIA BLD CULT: NORMAL

## 2022-08-29 LAB — BACTERIA SPEC ANAEROBE CULT: ABNORMAL

## 2022-08-30 ENCOUNTER — TELEPHONE (OUTPATIENT)
Dept: INTERNAL MEDICINE | Facility: CLINIC | Age: 42
End: 2022-08-30
Payer: COMMERCIAL

## 2022-08-30 NOTE — TELEPHONE ENCOUNTER
----- Message from Ally Martinez sent at 8/30/2022 11:01 AM CDT -----  Contact: Marianne Warren [phone] ext [phone]  [email or web address]     Patient is calling for Medical Advice regarding:    How long has patient had these symptoms:    Pharmacy name and phone#:    Would like response via Segminthart: call back    Comments: Marianne Warren Case management has been trying to reach the pt but just keeps getting voicemail if you can just pass that along. If you have any case management needs for the pt please feel free to reach out

## 2022-08-31 ENCOUNTER — OFFICE VISIT (OUTPATIENT)
Dept: PRIMARY CARE CLINIC | Facility: CLINIC | Age: 42
End: 2022-08-31
Payer: COMMERCIAL

## 2022-08-31 ENCOUNTER — OFFICE VISIT (OUTPATIENT)
Dept: SURGERY | Facility: CLINIC | Age: 42
End: 2022-08-31
Payer: COMMERCIAL

## 2022-08-31 VITALS
BODY MASS INDEX: 44.43 KG/M2 | BODY MASS INDEX: 42.66 KG/M2 | SYSTOLIC BLOOD PRESSURE: 129 MMHG | DIASTOLIC BLOOD PRESSURE: 76 MMHG | DIASTOLIC BLOOD PRESSURE: 76 MMHG | SYSTOLIC BLOOD PRESSURE: 129 MMHG | WEIGHT: 315 LBS | HEART RATE: 69 BPM | HEIGHT: 72 IN | HEART RATE: 69 BPM | TEMPERATURE: 98 F | WEIGHT: 315 LBS | OXYGEN SATURATION: 96 %

## 2022-08-31 DIAGNOSIS — L02.91 ABSCESS: Primary | ICD-10-CM

## 2022-08-31 DIAGNOSIS — J45.21 MILD INTERMITTENT ASTHMA WITH EXACERBATION: ICD-10-CM

## 2022-08-31 DIAGNOSIS — Z96.41 INSULIN PUMP IN PLACE: ICD-10-CM

## 2022-08-31 DIAGNOSIS — G47.33 OBSTRUCTIVE SLEEP APNEA: ICD-10-CM

## 2022-08-31 DIAGNOSIS — R10.9 ABDOMINAL PAIN, UNSPECIFIED ABDOMINAL LOCATION: Primary | ICD-10-CM

## 2022-08-31 DIAGNOSIS — E66.01 CLASS 3 SEVERE OBESITY DUE TO EXCESS CALORIES WITH SERIOUS COMORBIDITY AND BODY MASS INDEX (BMI) OF 45.0 TO 49.9 IN ADULT: ICD-10-CM

## 2022-08-31 DIAGNOSIS — E10.65 TYPE 1 DIABETES MELLITUS WITH HYPERGLYCEMIA: ICD-10-CM

## 2022-08-31 PROCEDURE — 3078F DIAST BP <80 MM HG: CPT | Mod: CPTII,S$GLB,, | Performed by: INTERNAL MEDICINE

## 2022-08-31 PROCEDURE — 1159F MED LIST DOCD IN RCRD: CPT | Mod: CPTII,S$GLB,, | Performed by: STUDENT IN AN ORGANIZED HEALTH CARE EDUCATION/TRAINING PROGRAM

## 2022-08-31 PROCEDURE — 3074F SYST BP LT 130 MM HG: CPT | Mod: CPTII,S$GLB,, | Performed by: STUDENT IN AN ORGANIZED HEALTH CARE EDUCATION/TRAINING PROGRAM

## 2022-08-31 PROCEDURE — 3061F NEG MICROALBUMINURIA REV: CPT | Mod: CPTII,S$GLB,, | Performed by: INTERNAL MEDICINE

## 2022-08-31 PROCEDURE — 3078F DIAST BP <80 MM HG: CPT | Mod: CPTII,S$GLB,, | Performed by: STUDENT IN AN ORGANIZED HEALTH CARE EDUCATION/TRAINING PROGRAM

## 2022-08-31 PROCEDURE — 1160F PR REVIEW ALL MEDS BY PRESCRIBER/CLIN PHARMACIST DOCUMENTED: ICD-10-PCS | Mod: CPTII,S$GLB,, | Performed by: INTERNAL MEDICINE

## 2022-08-31 PROCEDURE — 99999 PR PBB SHADOW E&M-EST. PATIENT-LVL IV: CPT | Mod: PBBFAC,,, | Performed by: INTERNAL MEDICINE

## 2022-08-31 PROCEDURE — 3078F PR MOST RECENT DIASTOLIC BLOOD PRESSURE < 80 MM HG: ICD-10-PCS | Mod: CPTII,S$GLB,, | Performed by: STUDENT IN AN ORGANIZED HEALTH CARE EDUCATION/TRAINING PROGRAM

## 2022-08-31 PROCEDURE — 3008F PR BODY MASS INDEX (BMI) DOCUMENTED: ICD-10-PCS | Mod: CPTII,S$GLB,, | Performed by: INTERNAL MEDICINE

## 2022-08-31 PROCEDURE — 3074F PR MOST RECENT SYSTOLIC BLOOD PRESSURE < 130 MM HG: ICD-10-PCS | Mod: CPTII,S$GLB,, | Performed by: STUDENT IN AN ORGANIZED HEALTH CARE EDUCATION/TRAINING PROGRAM

## 2022-08-31 PROCEDURE — 3066F NEPHROPATHY DOC TX: CPT | Mod: CPTII,S$GLB,, | Performed by: STUDENT IN AN ORGANIZED HEALTH CARE EDUCATION/TRAINING PROGRAM

## 2022-08-31 PROCEDURE — 1159F MED LIST DOCD IN RCRD: CPT | Mod: CPTII,S$GLB,, | Performed by: INTERNAL MEDICINE

## 2022-08-31 PROCEDURE — 3008F BODY MASS INDEX DOCD: CPT | Mod: CPTII,S$GLB,, | Performed by: STUDENT IN AN ORGANIZED HEALTH CARE EDUCATION/TRAINING PROGRAM

## 2022-08-31 PROCEDURE — 3074F SYST BP LT 130 MM HG: CPT | Mod: CPTII,S$GLB,, | Performed by: INTERNAL MEDICINE

## 2022-08-31 PROCEDURE — 4010F ACE/ARB THERAPY RXD/TAKEN: CPT | Mod: CPTII,S$GLB,, | Performed by: STUDENT IN AN ORGANIZED HEALTH CARE EDUCATION/TRAINING PROGRAM

## 2022-08-31 PROCEDURE — 4010F PR ACE/ARB THEARPY RXD/TAKEN: ICD-10-PCS | Mod: CPTII,S$GLB,, | Performed by: INTERNAL MEDICINE

## 2022-08-31 PROCEDURE — 3061F PR NEG MICROALBUMINURIA RESULT DOCUMENTED/REVIEW: ICD-10-PCS | Mod: CPTII,S$GLB,, | Performed by: STUDENT IN AN ORGANIZED HEALTH CARE EDUCATION/TRAINING PROGRAM

## 2022-08-31 PROCEDURE — 3078F PR MOST RECENT DIASTOLIC BLOOD PRESSURE < 80 MM HG: ICD-10-PCS | Mod: CPTII,S$GLB,, | Performed by: INTERNAL MEDICINE

## 2022-08-31 PROCEDURE — 99024 PR POST-OP FOLLOW-UP VISIT: ICD-10-PCS | Mod: S$GLB,,, | Performed by: STUDENT IN AN ORGANIZED HEALTH CARE EDUCATION/TRAINING PROGRAM

## 2022-08-31 PROCEDURE — 3066F PR DOCUMENTATION OF TREATMENT FOR NEPHROPATHY: ICD-10-PCS | Mod: CPTII,S$GLB,, | Performed by: STUDENT IN AN ORGANIZED HEALTH CARE EDUCATION/TRAINING PROGRAM

## 2022-08-31 PROCEDURE — 1160F PR REVIEW ALL MEDS BY PRESCRIBER/CLIN PHARMACIST DOCUMENTED: ICD-10-PCS | Mod: CPTII,S$GLB,, | Performed by: STUDENT IN AN ORGANIZED HEALTH CARE EDUCATION/TRAINING PROGRAM

## 2022-08-31 PROCEDURE — 3074F PR MOST RECENT SYSTOLIC BLOOD PRESSURE < 130 MM HG: ICD-10-PCS | Mod: CPTII,S$GLB,, | Performed by: INTERNAL MEDICINE

## 2022-08-31 PROCEDURE — 3046F PR MOST RECENT HEMOGLOBIN A1C LEVEL > 9.0%: ICD-10-PCS | Mod: CPTII,S$GLB,, | Performed by: INTERNAL MEDICINE

## 2022-08-31 PROCEDURE — 3066F PR DOCUMENTATION OF TREATMENT FOR NEPHROPATHY: ICD-10-PCS | Mod: CPTII,S$GLB,, | Performed by: INTERNAL MEDICINE

## 2022-08-31 PROCEDURE — 99999 PR PBB SHADOW E&M-EST. PATIENT-LVL III: CPT | Mod: PBBFAC,,, | Performed by: STUDENT IN AN ORGANIZED HEALTH CARE EDUCATION/TRAINING PROGRAM

## 2022-08-31 PROCEDURE — 99496 TRANSJ CARE MGMT HIGH F2F 7D: CPT | Mod: S$GLB,,, | Performed by: INTERNAL MEDICINE

## 2022-08-31 PROCEDURE — 99496 TRANSITIONAL CARE MANAGE SERVICE 7 DAY DISCHARGE: ICD-10-PCS | Mod: S$GLB,,, | Performed by: INTERNAL MEDICINE

## 2022-08-31 PROCEDURE — 3008F PR BODY MASS INDEX (BMI) DOCUMENTED: ICD-10-PCS | Mod: CPTII,S$GLB,, | Performed by: STUDENT IN AN ORGANIZED HEALTH CARE EDUCATION/TRAINING PROGRAM

## 2022-08-31 PROCEDURE — 4010F ACE/ARB THERAPY RXD/TAKEN: CPT | Mod: CPTII,S$GLB,, | Performed by: INTERNAL MEDICINE

## 2022-08-31 PROCEDURE — 3061F NEG MICROALBUMINURIA REV: CPT | Mod: CPTII,S$GLB,, | Performed by: STUDENT IN AN ORGANIZED HEALTH CARE EDUCATION/TRAINING PROGRAM

## 2022-08-31 PROCEDURE — 3008F BODY MASS INDEX DOCD: CPT | Mod: CPTII,S$GLB,, | Performed by: INTERNAL MEDICINE

## 2022-08-31 PROCEDURE — 99024 POSTOP FOLLOW-UP VISIT: CPT | Mod: S$GLB,,, | Performed by: STUDENT IN AN ORGANIZED HEALTH CARE EDUCATION/TRAINING PROGRAM

## 2022-08-31 PROCEDURE — 1159F PR MEDICATION LIST DOCUMENTED IN MEDICAL RECORD: ICD-10-PCS | Mod: CPTII,S$GLB,, | Performed by: INTERNAL MEDICINE

## 2022-08-31 PROCEDURE — 1159F PR MEDICATION LIST DOCUMENTED IN MEDICAL RECORD: ICD-10-PCS | Mod: CPTII,S$GLB,, | Performed by: STUDENT IN AN ORGANIZED HEALTH CARE EDUCATION/TRAINING PROGRAM

## 2022-08-31 PROCEDURE — 99999 PR PBB SHADOW E&M-EST. PATIENT-LVL III: ICD-10-PCS | Mod: PBBFAC,,, | Performed by: STUDENT IN AN ORGANIZED HEALTH CARE EDUCATION/TRAINING PROGRAM

## 2022-08-31 PROCEDURE — 3046F HEMOGLOBIN A1C LEVEL >9.0%: CPT | Mod: CPTII,S$GLB,, | Performed by: STUDENT IN AN ORGANIZED HEALTH CARE EDUCATION/TRAINING PROGRAM

## 2022-08-31 PROCEDURE — 3066F NEPHROPATHY DOC TX: CPT | Mod: CPTII,S$GLB,, | Performed by: INTERNAL MEDICINE

## 2022-08-31 PROCEDURE — 1160F RVW MEDS BY RX/DR IN RCRD: CPT | Mod: CPTII,S$GLB,, | Performed by: INTERNAL MEDICINE

## 2022-08-31 PROCEDURE — 3046F PR MOST RECENT HEMOGLOBIN A1C LEVEL > 9.0%: ICD-10-PCS | Mod: CPTII,S$GLB,, | Performed by: STUDENT IN AN ORGANIZED HEALTH CARE EDUCATION/TRAINING PROGRAM

## 2022-08-31 PROCEDURE — 1160F RVW MEDS BY RX/DR IN RCRD: CPT | Mod: CPTII,S$GLB,, | Performed by: STUDENT IN AN ORGANIZED HEALTH CARE EDUCATION/TRAINING PROGRAM

## 2022-08-31 PROCEDURE — 4010F PR ACE/ARB THEARPY RXD/TAKEN: ICD-10-PCS | Mod: CPTII,S$GLB,, | Performed by: STUDENT IN AN ORGANIZED HEALTH CARE EDUCATION/TRAINING PROGRAM

## 2022-08-31 PROCEDURE — 3061F PR NEG MICROALBUMINURIA RESULT DOCUMENTED/REVIEW: ICD-10-PCS | Mod: CPTII,S$GLB,, | Performed by: INTERNAL MEDICINE

## 2022-08-31 PROCEDURE — 3046F HEMOGLOBIN A1C LEVEL >9.0%: CPT | Mod: CPTII,S$GLB,, | Performed by: INTERNAL MEDICINE

## 2022-08-31 PROCEDURE — 99999 PR PBB SHADOW E&M-EST. PATIENT-LVL IV: ICD-10-PCS | Mod: PBBFAC,,, | Performed by: INTERNAL MEDICINE

## 2022-08-31 NOTE — PROGRESS NOTES
[facility]   New Visit Progress Note   Recent Hospital Discharge     PRESENTING HISTORY     Chief Complaint/Reason for Admission:  Follow up Hospital Discharge   PCP: Jesus Healy MD    History of Present Illness:  Mr. Clay Waters is a 42 y.o. male who was recently admitted to the hospital.    [facility]  General Surgery  Discharge Summary        Patient Name: Clay Waters  MRN: [id number]  Admission Date: 8/22/2022  Hospital Length of Stay: 1 days  Discharge Date and Time:  08/25/2022 1:21 PM  Attending Physician: Turner Wynne MD   Discharging Provider: Rivera Milligan MD  Primary Care Provider: Jesus Healy MD  ___________________________________________________________________    Today:  [facility] for [facility] follow up.  Seen in ED [facility] for left medial thigh abscess.  Underwent I&D; discharged to home on course of empiric Bactrim.  No cultures obtained.   Experienced persistent fever at home, and noted worsening induration around wound, so returned to [facility] ED 8/22/22 for re-evaluation.   Evaluation significant for fever, leukocytosis, and CT leg showing fluid collection at prior I&D site.   Condition complicated by diabetes, on insulin, HgBA1C 11.4.   Admitted to General Surgery service.   Empiric IV ABX initiated.   Taken to OR for I&D.  Condition improved, wound packing removed, patient discharged to home on empiric Augmentin.     Preliminary wound cultures from 8/23/22 growth of E coli (Sensitive to Augmentin), and Prevotella biva.  Blood Cx NGTD.     Patient accompanied today by family.  Ambulatory and independent with ADL's.  Reports compliance with all medication.  Wound healing, no drainage.   Blood glucose under better control since hospital discharge- he is now adhering to diabetic diet.     Does not have PCP.     Review of Systems  General ROS: negative for chills, fever or weight loss  Psychological ROS:  negative for hallucination, depression or suicidal ideation  Ophthalmic ROS: negative for blurry vision, photophobia or eye pain  ENT ROS: negative for epistaxis, sore throat or rhinorrhea  Respiratory ROS: no cough, shortness of breath, or wheezing  Cardiovascular ROS: no chest pain or dyspnea on exertion  Gastrointestinal ROS: no abdominal pain, change in bowel habits, or black/ bloody stools  Genito-Urinary ROS: no dysuria, trouble voiding, or hematuria  Musculoskeletal ROS: negative for gait disturbance or muscular weakness  Neurological ROS: no syncope or seizures; no ataxia  Dermatological ROS: negative for pruritis, rash and jaundice      PAST HISTORY:     Past Medical History:   Diagnosis Date    Childhood asthma     Class 3 severe obesity due to excess calories with serious comorbidity in adult 1/1/2020    Essential hypertension 10/1/2018    Influenza B 1/1/2020    Insulin pump in place 10/1/2018    Lung nodule 10/2/2018    <1cm multiple lung nodules in the setting of staph infection LTNS No history of cancer    Mild intermittent asthma with exacerbation 1/1/2020    Type 1 diabetes mellitus with hyperglycemia 10/1/2018       Past Surgical History:   Procedure Laterality Date    COLONOSCOPY N/A 11/4/2020    Procedure: COLONOSCOPY;  Surgeon: Jesus Claros MD;  Location: [facility] ([address] [place] [address]);  Service: Endoscopy;  Laterality: N/A;    ESOPHAGOGASTRODUODENOSCOPY N/A 11/4/2020    Procedure: EGD (ESOPHAGOGASTRODUODENOSCOPY);  Surgeon: Jesus Claros MD;  Location: [address]);  Service: Endoscopy;  Laterality: N/A;  2nd floor for wt/neck. has insulin pump     pt requesting a Wed-doesn't want to wait until December for Dr Dias availability       COVID test at [facility] on 11/1-GT    INCISION AND DRAINAGE OF ABSCESS Left 8/23/2022    Procedure: INCISION AND DRAINAGE, ABSCESS;  Surgeon: Turner Wynne MD;  Location: [facility];  Service: General;  Laterality: Left;    VASECTOMY         Family History    Problem Relation Age of Onset    Celiac disease Father     Celiac disease Daughter          MEDICATIONS & ALLERGIES:     Current Outpatient Medications on File Prior to Visit   Medication Sig Dispense Refill    amoxicillin-clavulanate 875-125mg (AUGMENTIN) 875-125 mg per tablet Take 1 tablet by mouth every 12 (twelve) hours. for 6 days 12 tablet 0    HUMALOG U-100 INSULIN 100 unit/mL injection USE WITH INSULIN PUMP. MAX DAILY DOSE 150 UNITS DAILY 120 mL 3    INSULIN PUMP CARTRIDGE SUBQ Inject into the skin.      losartan (COZAAR) 50 MG tablet Take 1 tablet (50 mg total) by mouth once daily. 90 tablet 3    NOVOLOG U-100 INSULIN ASPART 100 unit/mL injection USE WITH INSULIN PUMP. MAX DAILY DOSE 140 UNITS/DAY (Patient not taking: No sig reported) 120 mL 3    tadalafiL (CIALIS) 10 MG tablet TAKE 1 TABLET (10 MG TOTAL) BY MOUTH DAILY AS NEEDED FOR ERECTILE DYSFUNCTION. (Patient not taking: Reported on 8/31/2022) 30 tablet 5     No current facility-administered medications on file prior to visit.        Review of patient's allergies indicates:   Allergen Reactions    Vicodin [hydrocodone-acetaminophen] Nausea And Vomiting       OBJECTIVE:     Vital Signs:  /76 (BP Location: Right arm, Patient Position: Sitting, BP Method: Small (Automatic))   Pulse 69   Temp 98.1 °F (36.7 °C) (Oral)   Wt (!) 148.6 kg (327 lb 9.7 oz)   SpO2 96%   BMI 44.43 kg/m²   Wt Readings from Last 3 Encounters:   08/31/22 1453 (!) 148.3 kg (327 lb)   08/31/22 1417 (!) 148.6 kg (327 lb 9.7 oz)   08/25/22 0015 (!) 151.3 kg (333 lb 8.9 oz)   08/24/22 0030 (!) 154.3 kg (340 lb 2.7 oz)   08/22/22 2350 (!) 144.8 kg (319 lb 3.6 oz)   08/22/22 1556 (!) 142.9 kg (315 lb)     Body mass index is 44.43 kg/m².        Physical Exam:  /76 (BP Location: Right arm, Patient Position: Sitting, BP Method: Small (Automatic))   Pulse 69   Temp 98.1 °F (36.7 °C) (Oral)   Wt (!) 148.6 kg (327 lb 9.7 oz)   SpO2 96%   BMI 44.43 kg/m²   General  appearance: alert, cooperative, no distress  Constitutional:Oriented to person, place, and time  + obese    HEENT: Normocephalic, atraumatic, neck symmetrical, no nasal discharge   Eyes: conjunctivae/corneas clear, PERRL, EOM's intact  Lungs: clear to auscultation bilaterally, no dullness to percussion bilaterally  Heart: regular rate and rhythm without rub; no displacement of the PMI   Abdomen: soft, non-tender; bowel sounds normoactive; no organomegaly  Extremities: extremities not symmetric, R>L, non pitting edema,  no clubbing, cyanosis,   Integument: Skin color, texture, turgor normal; no rashes; hair distrubution normal  Neurologic: Alert and oriented X 3, normal strength, normal coordination and gait  Psychiatric: no pressured speech; normal affect; no evidence of impaired cognition     Laboratory  Lab Results   Component Value Date    WBC 9.64 08/25/2022    HGB 12.3 (L) 08/25/2022    HCT 36.6 (L) 08/25/2022    MCV 78 (L) 08/25/2022     08/25/2022     BMP  Lab Results   Component Value Date     08/25/2022    K 3.6 08/25/2022     08/25/2022    CO2 24 08/25/2022    BUN 9 08/25/2022    CREATININE 1.1 08/25/2022    CALCIUM 8.9 08/25/2022    ANIONGAP 13 08/25/2022    ESTGFRAFRICA >60 02/16/2022    EGFRNONAA >60 02/16/2022     Lab Results   Component Value Date    ALT 14 08/22/2022    AST 14 08/22/2022    ALKPHOS 105 08/22/2022    BILITOT 0.7 08/22/2022     Lab Results   Component Value Date    INR 1.0 09/30/2018     Lab Results   Component Value Date    HGBA1C 11.4 (H) 08/22/2022       Diagnostic Results:    CT Left Thigh with contrast 8/22/22:  1. Soft tissue induration and small fluid collection in the region of the medial thigh adjacent to the scrotum.  No definitive rim enhancement of the collection however developing abscess is a consideration.  There is induration of the adjacent fat and skin thickening concerning for infection.  Correlation is advised.  2. Urinary bladder wall thickening,  correlation with urinalysis recommended to exclude changes of cystitis.      TRANSITION OF CARE:     [facility] Contact Note: 8/26/22     Family and/or Caretaker present at visit?  Yes.  Diagnostic tests reviewed/disposition: I have reviewed all completed as well as pending diagnostic tests at the time of discharge.  Disease/illness education: Yes  Home health/community services discussion/referrals: Patient does not have home health established from hospital visit.  They do not need home health.  If needed, we will set up home health for the patient.   Establishment or re-establishment of referral orders for community resources: No other necessary community resources.   Discussion with other health care providers: No discussion with other health care providers necessary.     ASSESSMENT & PLAN:     Abscess  - s/p I&D  - polymicrobial as detailed above  - complete Augmentin course as directed  - further management per General Surgery team   - will be seen 8/31/22    Type 1 diabetes mellitus with hyperglycemia  Insulin pump in place  -HgBA1C 11.1   - managed by Endocrinology team    Obstructive sleep apnea  - wears CPAP nightly     Class 3 severe obesity due to excess calories with serious comorbidity and body mass index (BMI) of 45.0 to 49.9 in adult  - counseling and education provided    [facility]  He will see Dr Reed 10/3/22 to establish new Primary Care.     Instructions for the patient:      Scheduled Follow-up :  Future Appointments   Date Time Provider Department Center   10/3/2022  4:00 PM Alvaro Reed MD [facility]       Post Visit Medication List:     Medication List            Accurate as of August 31, 2022 11:59 PM. If you have any questions, ask your nurse or doctor.                CONTINUE taking these medications      amoxicillin-clavulanate 875-125mg 875-125 mg per tablet  Commonly known as: AUGMENTIN  Take 1 tablet by mouth every 12 (twelve)  hours. for 6 days     HumaLOG U-100 Insulin 100 unit/mL injection  Generic drug: insulin lispro  USE WITH INSULIN PUMP. MAX DAILY DOSE 150 UNITS DAILY     INSULIN PUMP CARTRIDGE SUBQ     losartan 50 MG tablet  Commonly known as: COZAAR  Take 1 tablet (50 mg total) by mouth once daily.     NovoLOG U-100 Insulin aspart 100 unit/mL injection  Generic drug: insulin aspart U-100  USE WITH INSULIN PUMP. MAX DAILY DOSE 140 UNITS/DAY     tadalafiL 10 MG tablet  Commonly known as: CIALIS  TAKE 1 TABLET (10 MG TOTAL) BY MOUTH DAILY AS NEEDED FOR ERECTILE DYSFUNCTION.              Signing Physician:  Chloé Nayak MD

## 2022-08-31 NOTE — PROGRESS NOTES
S/p left thigh I&D  Healiing well  No pain  Ambulating well  No drainage  No TTP, no erythema, healing well  E coli sensitive to augmentin  Finished augmentin today  Normal activity  Glucose 100-150s  RTC prn

## 2022-09-01 PROBLEM — G47.33 OBSTRUCTIVE SLEEP APNEA: Status: ACTIVE | Noted: 2022-09-01

## 2022-09-01 PROBLEM — L02.91 ABSCESS: Status: ACTIVE | Noted: 2022-09-01

## 2022-09-01 PROBLEM — R10.9 ABDOMINAL PAIN: Status: RESOLVED | Noted: 2020-11-04 | Resolved: 2022-09-01

## 2022-09-19 ENCOUNTER — PATIENT OUTREACH (OUTPATIENT)
Dept: ADMINISTRATIVE | Facility: HOSPITAL | Age: 42
End: 2022-09-19
Payer: COMMERCIAL

## 2022-09-19 ENCOUNTER — PATIENT MESSAGE (OUTPATIENT)
Dept: ADMINISTRATIVE | Facility: HOSPITAL | Age: 42
End: 2022-09-19
Payer: COMMERCIAL

## 2022-09-19 NOTE — PROGRESS NOTES
Care Everywhere updates requested and reviewed.  Immunizations reconciled. Media reports reviewed.  Duplicate HM overrides and  orders removed.  Overdue HM topic chart audit and/or requested.  Overdue lab testing linked to upcoming lab appointments if applies.      Health Maintenance Due   Topic Date Due    Hepatitis C Screening  Never done    Eye Exam  Never done    HIV Screening  Never done    Low Dose Statin  Never done    Foot Exam  07/10/2020    Lipid Panel  2021    Pneumococcal Vaccines (Age 0-64) (2 - PCV) 2021    COVID-19 Vaccine (3 - Booster for Pfizer series) 2022    Influenza Vaccine (1) 2022

## 2022-09-26 LAB — FUNGUS SPEC CULT: NORMAL

## 2022-10-03 ENCOUNTER — OFFICE VISIT (OUTPATIENT)
Dept: FAMILY MEDICINE | Facility: CLINIC | Age: 42
End: 2022-10-03
Payer: COMMERCIAL

## 2022-10-03 VITALS
HEART RATE: 85 BPM | BODY MASS INDEX: 42.66 KG/M2 | SYSTOLIC BLOOD PRESSURE: 132 MMHG | OXYGEN SATURATION: 98 % | DIASTOLIC BLOOD PRESSURE: 80 MMHG | WEIGHT: 315 LBS | TEMPERATURE: 98 F | HEIGHT: 72 IN

## 2022-10-03 DIAGNOSIS — E10.65 TYPE 1 DIABETES MELLITUS WITH HYPERGLYCEMIA: ICD-10-CM

## 2022-10-03 DIAGNOSIS — Z96.41 INSULIN PUMP IN PLACE: ICD-10-CM

## 2022-10-03 DIAGNOSIS — Z76.89 ENCOUNTER TO ESTABLISH CARE WITH NEW DOCTOR: Primary | ICD-10-CM

## 2022-10-03 DIAGNOSIS — I10 ESSENTIAL HYPERTENSION: ICD-10-CM

## 2022-10-03 DIAGNOSIS — E66.01 CLASS 3 SEVERE OBESITY DUE TO EXCESS CALORIES WITH SERIOUS COMORBIDITY AND BODY MASS INDEX (BMI) OF 45.0 TO 49.9 IN ADULT: ICD-10-CM

## 2022-10-03 PROCEDURE — 3008F PR BODY MASS INDEX (BMI) DOCUMENTED: ICD-10-PCS | Mod: CPTII,S$GLB,, | Performed by: FAMILY MEDICINE

## 2022-10-03 PROCEDURE — 3061F PR NEG MICROALBUMINURIA RESULT DOCUMENTED/REVIEW: ICD-10-PCS | Mod: CPTII,S$GLB,, | Performed by: FAMILY MEDICINE

## 2022-10-03 PROCEDURE — 1159F PR MEDICATION LIST DOCUMENTED IN MEDICAL RECORD: ICD-10-PCS | Mod: CPTII,S$GLB,, | Performed by: FAMILY MEDICINE

## 2022-10-03 PROCEDURE — 3075F SYST BP GE 130 - 139MM HG: CPT | Mod: CPTII,S$GLB,, | Performed by: FAMILY MEDICINE

## 2022-10-03 PROCEDURE — 3066F PR DOCUMENTATION OF TREATMENT FOR NEPHROPATHY: ICD-10-PCS | Mod: CPTII,S$GLB,, | Performed by: FAMILY MEDICINE

## 2022-10-03 PROCEDURE — 3061F NEG MICROALBUMINURIA REV: CPT | Mod: CPTII,S$GLB,, | Performed by: FAMILY MEDICINE

## 2022-10-03 PROCEDURE — 4010F ACE/ARB THERAPY RXD/TAKEN: CPT | Mod: CPTII,S$GLB,, | Performed by: FAMILY MEDICINE

## 2022-10-03 PROCEDURE — 3046F HEMOGLOBIN A1C LEVEL >9.0%: CPT | Mod: CPTII,S$GLB,, | Performed by: FAMILY MEDICINE

## 2022-10-03 PROCEDURE — 3079F DIAST BP 80-89 MM HG: CPT | Mod: CPTII,S$GLB,, | Performed by: FAMILY MEDICINE

## 2022-10-03 PROCEDURE — 99214 PR OFFICE/OUTPT VISIT, EST, LEVL IV, 30-39 MIN: ICD-10-PCS | Mod: S$GLB,,, | Performed by: FAMILY MEDICINE

## 2022-10-03 PROCEDURE — 99999 PR PBB SHADOW E&M-EST. PATIENT-LVL IV: CPT | Mod: PBBFAC,,, | Performed by: FAMILY MEDICINE

## 2022-10-03 PROCEDURE — 99214 OFFICE O/P EST MOD 30 MIN: CPT | Mod: S$GLB,,, | Performed by: FAMILY MEDICINE

## 2022-10-03 PROCEDURE — 4010F PR ACE/ARB THEARPY RXD/TAKEN: ICD-10-PCS | Mod: CPTII,S$GLB,, | Performed by: FAMILY MEDICINE

## 2022-10-03 PROCEDURE — 1159F MED LIST DOCD IN RCRD: CPT | Mod: CPTII,S$GLB,, | Performed by: FAMILY MEDICINE

## 2022-10-03 PROCEDURE — 99999 PR PBB SHADOW E&M-EST. PATIENT-LVL IV: ICD-10-PCS | Mod: PBBFAC,,, | Performed by: FAMILY MEDICINE

## 2022-10-03 PROCEDURE — 3075F PR MOST RECENT SYSTOLIC BLOOD PRESS GE 130-139MM HG: ICD-10-PCS | Mod: CPTII,S$GLB,, | Performed by: FAMILY MEDICINE

## 2022-10-03 PROCEDURE — 3066F NEPHROPATHY DOC TX: CPT | Mod: CPTII,S$GLB,, | Performed by: FAMILY MEDICINE

## 2022-10-03 PROCEDURE — 3079F PR MOST RECENT DIASTOLIC BLOOD PRESSURE 80-89 MM HG: ICD-10-PCS | Mod: CPTII,S$GLB,, | Performed by: FAMILY MEDICINE

## 2022-10-03 PROCEDURE — 3008F BODY MASS INDEX DOCD: CPT | Mod: CPTII,S$GLB,, | Performed by: FAMILY MEDICINE

## 2022-10-03 PROCEDURE — 3046F PR MOST RECENT HEMOGLOBIN A1C LEVEL > 9.0%: ICD-10-PCS | Mod: CPTII,S$GLB,, | Performed by: FAMILY MEDICINE

## 2022-10-03 NOTE — PROGRESS NOTES
(Portions of this note were dictated using voice recognition software and may contain dictation related errors in spelling/grammar/syntax not found on text review)    CC:   Chief Complaint   Patient presents with    [facility]       HPI: 42 y.o. male presented to [facility] as a new patient.  He has a medical history significant for type 1 diabetes mellitus, on insulin pump, essential hypertension, childhood asthma, class 3 severe obesity, obstructive sleep apnea on CPAP.  He is followed by Endocrinology, most recent HbA1c was 11.4, patient endorses not taking care of himself in the last 1 year as he was tired of long term diagnosis of diabetes, he was diagnosed when he was 9, he was admitted in the hospital in august with right thigh abscess which needed surgical intervention and treated with antibiotics. He monitors blood sugar 4-5 times every day and manually give himself insulin bolus based on blood sugars readings, he is trying to do regular workout in the gym and trying to eat healthy options to cut back on fats and carbohydrates.  He takes losartan 50 mg, reports adherence, blood pressure has been stable.  He is scheduled for blood workup in December.  He does not smoke, has no toxic habits.  He denies flu vaccine.  He denies having any symptoms today pain    Past Medical History:   Diagnosis Date    Childhood asthma     Class 3 severe obesity due to excess calories with serious comorbidity in adult 01/01/2020    Essential hypertension 10/01/2018    Influenza B 01/01/2020    Insulin pump in place 10/01/2018    Lung nodule 10/02/2018    <1cm multiple lung nodules in the setting of staph infection LTNS No history of cancer    Mild intermittent asthma with exacerbation 01/01/2020    Obstructive sleep apnea on CPAP     Type 1 diabetes mellitus with hyperglycemia 10/01/2018       Past Surgical History:   Procedure Laterality Date    COLONOSCOPY N/A 11/4/2020    Procedure: COLONOSCOPY;  Surgeon: Jesus JACKSON  MD Harlan;  Location: [facility] (2ND FLR);  Service: Endoscopy;  Laterality: N/A;    ESOPHAGOGASTRODUODENOSCOPY N/A 11/4/2020    Procedure: EGD (ESOPHAGOGASTRODUODENOSCOPY);  Surgeon: Jesus Claros MD;  Location: [facility] (2ND FLR);  Service: Endoscopy;  Laterality: N/A;  2nd floor for wt/neck. has insulin pump     pt requesting a Wed-doesn't want to wait until December for Dr Dias availability       COVID test at [facility] on 11/1-GT    INCISION AND DRAINAGE OF ABSCESS Left 8/23/2022    Procedure: INCISION AND DRAINAGE, ABSCESS;  Surgeon: Turner Wynne MD;  Location: [facility] OR;  Service: General;  Laterality: Left;    VASECTOMY         Family History   Problem Relation Age of Onset    Celiac disease Father     Celiac disease Daughter        Social History     Tobacco Use    Smoking status: Never    Smokeless tobacco: Never   Substance Use Topics    Alcohol use: Yes     Comment: occas, none recently    Drug use: No       Lab Results   Component Value Date    WBC 9.64 08/25/2022    HGB 12.3 (L) 08/25/2022    HCT 36.6 (L) 08/25/2022    MCV 78 (L) 08/25/2022     08/25/2022    CHOL 144 01/02/2020    TRIG 56 01/02/2020    HDL 54 01/02/2020    ALT 14 08/22/2022    AST 14 08/22/2022    BILITOT 0.7 08/22/2022    ALKPHOS 105 08/22/2022     08/25/2022    K 3.6 08/25/2022     08/25/2022    CREATININE 1.1 08/25/2022    ESTGFRAFRICA >60 02/16/2022    EGFRNONAA >60 02/16/2022    CALCIUM 8.9 08/25/2022    ALBUMIN 2.4 (L) 08/25/2022    BUN 9 08/25/2022    CO2 24 08/25/2022    TSH 1.244 09/09/2020    INR 1.0 09/30/2018    HGBA1C 11.4 (H) 08/22/2022    MICALBCREAT Unable to calculate 02/16/2022    LDLCALC 78.8 01/02/2020     (H) 08/25/2022             Vital signs reviewed  PE:   APPEARANCE: In no acute distress.    HEAD: Normocephalic, atraumatic.  EYES: EOMI.  Conjunctivae noninjected.   NECK: Supple with no cervical lymphadenopathy.    CHEST: Lungs clear to auscultation with no wheezes or  crackles.  CARDIOVASCULAR: Normal S1, S2. No rubs, murmurs, or gallops.  ABDOMEN: Bowel sounds normal. Not distended. Soft. No tenderness or masses. No organomegaly.  EXTREMITIES: No edema, cyanosis, or clubbing.    Review of Systems   Constitutional:  Negative for chills, fatigue and fever.   HENT: Negative.     Respiratory:  Negative for cough, shortness of breath and wheezing.    Cardiovascular: Negative.    Gastrointestinal: Negative.    Genitourinary: Negative.    Musculoskeletal: Negative.    Neurological: Negative.    Psychiatric/Behavioral: Negative.     All other systems reviewed and are negative.    IMPRESSION  1. Encounter to establish care with new doctor    2. Essential hypertension    3. Type 1 diabetes mellitus with hyperglycemia    4. Insulin pump in place    5. Class 3 severe obesity due to excess calories with serious comorbidity and body mass index (BMI) of 45.0 to 49.9 in adult            PLAN      1. Essential hypertension  Stable  Continue losartan 50 mg      2. Type 1 diabetes mellitus with hyperglycemia  HbA1C 11/1  Followed by endocrinology, on insulin pump    Up-to-date with eye exam in May    - Ambulatory referral/consult to Podiatry; Future      3. Insulin pump in place      4. Encounter to establish care with new doctor  - Lipid Panel; Future      5. Class 3 severe obesity due to excess calories with serious comorbidity and body mass index (BMI) of 45.0 to 49.9 in adult  BMI 43  Counseling provided on healthy lifestyle, encouraged to do moderate intensity regular exercise 30 minutes every day 5 days a week, to include vegetables and fruits and cut back on saturated fats and carbohydrates.          SCREENINGS     Immunizations:   UTD with Covid ans Tdap  Denies flu vaccine      Age/demographic appropriate health maintenance:    Health Maintenance Due   Topic Date Due    Hepatitis C Screening  Never done    Eye Exam  Never done    HIV Screening  Never done    Low Dose Statin  Never done     Foot Exam  07/10/2020    Lipid Panel  01/02/2021    Pneumococcal Vaccines (Age 0-64) (2 - PCV) 01/13/2021    COVID-19 Vaccine (3 - Booster for Pfizer series) 10/14/2021    Influenza Vaccine (1) 09/01/2022           Alvaro Reed   10/3/2022

## 2022-10-04 ENCOUNTER — TELEPHONE (OUTPATIENT)
Dept: FAMILY MEDICINE | Facility: CLINIC | Age: 42
End: 2022-10-04
Payer: COMMERCIAL

## 2022-10-05 ENCOUNTER — TELEPHONE (OUTPATIENT)
Dept: FAMILY MEDICINE | Facility: CLINIC | Age: 42
End: 2022-10-05
Payer: COMMERCIAL

## 2022-10-10 ENCOUNTER — PATIENT MESSAGE (OUTPATIENT)
Dept: ADMINISTRATIVE | Facility: HOSPITAL | Age: 42
End: 2022-10-10
Payer: COMMERCIAL

## 2022-10-12 LAB
ACID FAST MOD KINY STN SPEC: NORMAL
MYCOBACTERIUM SPEC QL CULT: NORMAL

## 2022-11-03 ENCOUNTER — OFFICE VISIT (OUTPATIENT)
Dept: URGENT CARE | Facility: CLINIC | Age: 42
End: 2022-11-03
Payer: COMMERCIAL

## 2022-11-03 VITALS
SYSTOLIC BLOOD PRESSURE: 128 MMHG | HEART RATE: 78 BPM | HEIGHT: 60 IN | BODY MASS INDEX: 61.84 KG/M2 | OXYGEN SATURATION: 95 % | RESPIRATION RATE: 16 BRPM | DIASTOLIC BLOOD PRESSURE: 79 MMHG | WEIGHT: 315 LBS | TEMPERATURE: 99 F

## 2022-11-03 DIAGNOSIS — J10.1 INFLUENZA A: Primary | ICD-10-CM

## 2022-11-03 LAB
CTP QC/QA: YES
CTP QC/QA: YES
POC MOLECULAR INFLUENZA A AGN: POSITIVE
POC MOLECULAR INFLUENZA B AGN: NEGATIVE
SARS-COV-2 AG RESP QL IA.RAPID: NEGATIVE

## 2022-11-03 PROCEDURE — 1159F MED LIST DOCD IN RCRD: CPT | Mod: CPTII,S$GLB,, | Performed by: FAMILY MEDICINE

## 2022-11-03 PROCEDURE — 3061F PR NEG MICROALBUMINURIA RESULT DOCUMENTED/REVIEW: ICD-10-PCS | Mod: CPTII,S$GLB,, | Performed by: FAMILY MEDICINE

## 2022-11-03 PROCEDURE — 3066F NEPHROPATHY DOC TX: CPT | Mod: CPTII,S$GLB,, | Performed by: FAMILY MEDICINE

## 2022-11-03 PROCEDURE — 4010F PR ACE/ARB THEARPY RXD/TAKEN: ICD-10-PCS | Mod: CPTII,S$GLB,, | Performed by: FAMILY MEDICINE

## 2022-11-03 PROCEDURE — 1159F PR MEDICATION LIST DOCUMENTED IN MEDICAL RECORD: ICD-10-PCS | Mod: CPTII,S$GLB,, | Performed by: FAMILY MEDICINE

## 2022-11-03 PROCEDURE — 87502 INFLUENZA DNA AMP PROBE: CPT | Mod: QW,S$GLB,, | Performed by: FAMILY MEDICINE

## 2022-11-03 PROCEDURE — 3066F PR DOCUMENTATION OF TREATMENT FOR NEPHROPATHY: ICD-10-PCS | Mod: CPTII,S$GLB,, | Performed by: FAMILY MEDICINE

## 2022-11-03 PROCEDURE — 3008F BODY MASS INDEX DOCD: CPT | Mod: CPTII,S$GLB,, | Performed by: FAMILY MEDICINE

## 2022-11-03 PROCEDURE — 87811 SARS CORONAVIRUS 2 ANTIGEN POCT, MANUAL READ: ICD-10-PCS | Mod: QW,S$GLB,, | Performed by: FAMILY MEDICINE

## 2022-11-03 PROCEDURE — 3074F SYST BP LT 130 MM HG: CPT | Mod: CPTII,S$GLB,, | Performed by: FAMILY MEDICINE

## 2022-11-03 PROCEDURE — 3046F HEMOGLOBIN A1C LEVEL >9.0%: CPT | Mod: CPTII,S$GLB,, | Performed by: FAMILY MEDICINE

## 2022-11-03 PROCEDURE — 1160F RVW MEDS BY RX/DR IN RCRD: CPT | Mod: CPTII,S$GLB,, | Performed by: FAMILY MEDICINE

## 2022-11-03 PROCEDURE — 1160F PR REVIEW ALL MEDS BY PRESCRIBER/CLIN PHARMACIST DOCUMENTED: ICD-10-PCS | Mod: CPTII,S$GLB,, | Performed by: FAMILY MEDICINE

## 2022-11-03 PROCEDURE — 87502 POCT INFLUENZA A/B MOLECULAR: ICD-10-PCS | Mod: QW,S$GLB,, | Performed by: FAMILY MEDICINE

## 2022-11-03 PROCEDURE — 3078F DIAST BP <80 MM HG: CPT | Mod: CPTII,S$GLB,, | Performed by: FAMILY MEDICINE

## 2022-11-03 PROCEDURE — 3074F PR MOST RECENT SYSTOLIC BLOOD PRESSURE < 130 MM HG: ICD-10-PCS | Mod: CPTII,S$GLB,, | Performed by: FAMILY MEDICINE

## 2022-11-03 PROCEDURE — 3061F NEG MICROALBUMINURIA REV: CPT | Mod: CPTII,S$GLB,, | Performed by: FAMILY MEDICINE

## 2022-11-03 PROCEDURE — 99214 OFFICE O/P EST MOD 30 MIN: CPT | Mod: S$GLB,,, | Performed by: FAMILY MEDICINE

## 2022-11-03 PROCEDURE — 3008F PR BODY MASS INDEX (BMI) DOCUMENTED: ICD-10-PCS | Mod: CPTII,S$GLB,, | Performed by: FAMILY MEDICINE

## 2022-11-03 PROCEDURE — 87811 SARS-COV-2 COVID19 W/OPTIC: CPT | Mod: QW,S$GLB,, | Performed by: FAMILY MEDICINE

## 2022-11-03 PROCEDURE — 99214 PR OFFICE/OUTPT VISIT, EST, LEVL IV, 30-39 MIN: ICD-10-PCS | Mod: S$GLB,,, | Performed by: FAMILY MEDICINE

## 2022-11-03 PROCEDURE — 3078F PR MOST RECENT DIASTOLIC BLOOD PRESSURE < 80 MM HG: ICD-10-PCS | Mod: CPTII,S$GLB,, | Performed by: FAMILY MEDICINE

## 2022-11-03 PROCEDURE — 3046F PR MOST RECENT HEMOGLOBIN A1C LEVEL > 9.0%: ICD-10-PCS | Mod: CPTII,S$GLB,, | Performed by: FAMILY MEDICINE

## 2022-11-03 PROCEDURE — 4010F ACE/ARB THERAPY RXD/TAKEN: CPT | Mod: CPTII,S$GLB,, | Performed by: FAMILY MEDICINE

## 2022-11-03 RX ORDER — BENZONATATE 100 MG/1
100 CAPSULE ORAL EVERY 6 HOURS PRN
Qty: 30 CAPSULE | Refills: 1 | Status: SHIPPED | OUTPATIENT
Start: 2022-11-03 | End: 2023-11-03

## 2022-11-03 RX ORDER — BENZONATATE 100 MG/1
100 CAPSULE ORAL 3 TIMES DAILY PRN
Qty: 30 CAPSULE | Refills: 0 | Status: SHIPPED | OUTPATIENT
Start: 2022-11-03 | End: 2022-11-13

## 2022-11-03 RX ORDER — ALBUTEROL SULFATE 90 UG/1
2 AEROSOL, METERED RESPIRATORY (INHALATION) EVERY 6 HOURS PRN
Qty: 18 G | Refills: 0 | Status: SHIPPED | OUTPATIENT
Start: 2022-11-03 | End: 2023-11-03

## 2022-11-03 NOTE — PROGRESS NOTES
Subjective:       Patient ID: Clay Waters is a 42 y.o. male.    Vitals:  height is 5' (1.524 m) and weight is 142.9 kg (315 lb) (abnormal). His oral temperature is 99.2 °F (37.3 °C). His blood pressure is 128/79 and his pulse is 78. His respiration is 16 and oxygen saturation is 95%.     Chief Complaint: Cough    This is a 42 y.o. male who presents today with a chief complaint of congestion, runny nose (clear), cough (causing lower rib px), fever (100 - highest) x 4 days. Sore throat only on first day. No H/A. Progressively getting better. Pt has been treating with Dayquil, Nyquil (last dose last night at 9:30pm).     Pt took at-home COVID test 2 days ago - negative.    Pt has hx of DM - Type I    Cough  The cough is Productive of sputum. Associated symptoms include ear congestion, postnasal drip and wheezing. Pertinent negatives include no ear pain. His past medical history is significant for asthma, bronchitis and pneumonia. childhood asthma     HENT:  Positive for postnasal drip. Negative for ear pain.    Respiratory:  Positive for cough and wheezing.      Objective:      Physical Exam   Constitutional: He does not appear ill. No distress. obesity  HENT:   Head: Normocephalic and atraumatic.   Nose: Congestion present.   Mouth/Throat: Mucous membranes are moist. Posterior oropharyngeal erythema present.   Eyes: Pupils are equal, round, and reactive to light. Extraocular movement intact   Neck: Neck supple.   Cardiovascular: Normal rate, regular rhythm, normal heart sounds and normal pulses.   Pulmonary/Chest: Effort normal. He has wheezes.   Abdominal: Normal appearance. Soft.   Neurological: He is alert.   Nursing note and vitals reviewed.      Results for orders placed or performed in visit on 11/03/22   SARS Coronavirus 2 Antigen, POCT Manual Read   Result Value Ref Range    SARS Coronavirus 2 Antigen Negative Negative     Acceptable Yes    POCT Influenza A/B MOLECULAR   Result Value  Ref Range    POC Molecular Influenza A Ag Positive (A) Negative, Not Reported    POC Molecular Influenza B Ag Negative Negative, Not Reported     Acceptable Yes       Assessment:       1. Influenza A          Plan:         Influenza A  -     SARS Coronavirus 2 Antigen, POCT Manual Read  -     Cancel: POCT Influenza A/B  -     POCT Influenza A/B MOLECULAR  -     albuterol (PROAIR HFA) 90 mcg/actuation inhaler; Inhale 2 puffs into the lungs every 6 (six) hours as needed for Wheezing. Rescue  Dispense: 18 g; Refill: 0  -     benzonatate (TESSALON PERLES) 100 MG capsule; Take 1 capsule (100 mg total) by mouth every 6 (six) hours as needed for Cough.  Dispense: 30 capsule; Refill: 1     Symptom monitoring and ER precautions discussed

## 2022-11-14 ENCOUNTER — PATIENT OUTREACH (OUTPATIENT)
Dept: ADMINISTRATIVE | Facility: HOSPITAL | Age: 42
End: 2022-11-14
Payer: COMMERCIAL

## 2022-12-15 LAB
HBA1C MFR BLD: 8.1 % (ref 4–6)
MICROALBUMIN/CREATININE RATIO, URINE (OHS): 2 UG/MG

## 2023-01-11 ENCOUNTER — PATIENT OUTREACH (OUTPATIENT)
Dept: ADMINISTRATIVE | Facility: HOSPITAL | Age: 43
End: 2023-01-11
Payer: COMMERCIAL

## 2023-01-17 ENCOUNTER — PATIENT MESSAGE (OUTPATIENT)
Dept: ADMINISTRATIVE | Facility: HOSPITAL | Age: 43
End: 2023-01-17
Payer: COMMERCIAL

## 2023-01-18 ENCOUNTER — PATIENT OUTREACH (OUTPATIENT)
Dept: ADMINISTRATIVE | Facility: HOSPITAL | Age: 43
End: 2023-01-18
Payer: COMMERCIAL

## 2023-01-20 ENCOUNTER — PATIENT MESSAGE (OUTPATIENT)
Dept: FAMILY MEDICINE | Facility: CLINIC | Age: 43
End: 2023-01-20
Payer: COMMERCIAL

## 2023-04-11 ENCOUNTER — PATIENT MESSAGE (OUTPATIENT)
Dept: ADMINISTRATIVE | Facility: HOSPITAL | Age: 43
End: 2023-04-11
Payer: COMMERCIAL

## 2023-04-26 DIAGNOSIS — E11.9 TYPE 2 DIABETES MELLITUS WITHOUT COMPLICATION: ICD-10-CM

## 2023-05-01 ENCOUNTER — PATIENT MESSAGE (OUTPATIENT)
Dept: ADMINISTRATIVE | Facility: HOSPITAL | Age: 43
End: 2023-05-01
Payer: COMMERCIAL

## 2023-05-18 DIAGNOSIS — I10 ESSENTIAL HYPERTENSION: ICD-10-CM

## 2023-05-18 RX ORDER — LOSARTAN POTASSIUM 50 MG/1
TABLET ORAL
Qty: 90 TABLET | Refills: 3 | OUTPATIENT
Start: 2023-05-18

## 2023-06-21 ENCOUNTER — PATIENT MESSAGE (OUTPATIENT)
Dept: ADMINISTRATIVE | Facility: HOSPITAL | Age: 43
End: 2023-06-21
Payer: COMMERCIAL

## 2023-06-28 ENCOUNTER — PATIENT OUTREACH (OUTPATIENT)
Dept: ADMINISTRATIVE | Facility: HOSPITAL | Age: 43
End: 2023-06-28
Payer: COMMERCIAL

## 2023-06-28 ENCOUNTER — PATIENT MESSAGE (OUTPATIENT)
Dept: ADMINISTRATIVE | Facility: HOSPITAL | Age: 43
End: 2023-06-28
Payer: COMMERCIAL

## 2023-07-05 LAB — HBA1C MFR BLD: 7.1 % (ref 4–6)

## 2023-07-07 ENCOUNTER — PATIENT OUTREACH (OUTPATIENT)
Dept: ADMINISTRATIVE | Facility: HOSPITAL | Age: 43
End: 2023-07-07
Payer: COMMERCIAL

## 2023-08-30 DIAGNOSIS — I10 ESSENTIAL HYPERTENSION: ICD-10-CM

## 2023-09-11 ENCOUNTER — PATIENT MESSAGE (OUTPATIENT)
Dept: ADMINISTRATIVE | Facility: HOSPITAL | Age: 43
End: 2023-09-11
Payer: COMMERCIAL

## 2023-10-05 ENCOUNTER — PATIENT MESSAGE (OUTPATIENT)
Dept: ADMINISTRATIVE | Facility: HOSPITAL | Age: 43
End: 2023-10-05
Payer: COMMERCIAL

## 2023-10-16 ENCOUNTER — OCCUPATIONAL HEALTH (OUTPATIENT)
Dept: URGENT CARE | Facility: CLINIC | Age: 43
End: 2023-10-16

## 2023-10-16 DIAGNOSIS — Z00.00 ENCOUNTER FOR PHYSICAL EXAMINATION: Primary | ICD-10-CM

## 2023-10-16 LAB
BREATH ALCOHOL: 0
GLUCOSE SERPL-MCNC: 230 MG/DL (ref 70–110)

## 2023-10-16 PROCEDURE — 82075 ASSAY OF BREATH ETHANOL: CPT | Mod: S$GLB,,, | Performed by: FAMILY MEDICINE

## 2023-10-16 PROCEDURE — 82962 GLUCOSE BLOOD TEST: CPT | Mod: S$GLB,,, | Performed by: FAMILY MEDICINE

## 2023-10-16 PROCEDURE — 80305 DRUG TEST PRSMV DIR OPT OBS: CPT | Mod: S$GLB,,, | Performed by: FAMILY MEDICINE

## 2023-10-16 PROCEDURE — 82962 POCT GLUCOSE, HAND-HELD DEVICE: ICD-10-PCS | Mod: S$GLB,,, | Performed by: FAMILY MEDICINE

## 2023-10-16 PROCEDURE — 99499 PHYSICAL, BASIC COMPLEXITY: ICD-10-PCS | Mod: S$GLB,,, | Performed by: FAMILY MEDICINE

## 2023-10-16 PROCEDURE — 80305 OOH COLLECTION ONLY DRUG SCREEN: ICD-10-PCS | Mod: S$GLB,,, | Performed by: FAMILY MEDICINE

## 2023-10-16 PROCEDURE — 99499 UNLISTED E&M SERVICE: CPT | Mod: S$GLB,,, | Performed by: FAMILY MEDICINE

## 2023-10-16 PROCEDURE — 82075 POCT BREATH ALCOHOL TEST: ICD-10-PCS | Mod: S$GLB,,, | Performed by: FAMILY MEDICINE

## 2023-10-16 RX ORDER — FLASH GLUCOSE SENSOR
KIT MISCELLANEOUS
COMMUNITY
Start: 2023-09-23

## 2023-10-23 ENCOUNTER — PATIENT MESSAGE (OUTPATIENT)
Dept: ADMINISTRATIVE | Facility: HOSPITAL | Age: 43
End: 2023-10-23
Payer: COMMERCIAL

## 2023-11-07 ENCOUNTER — PATIENT OUTREACH (OUTPATIENT)
Dept: ADMINISTRATIVE | Facility: HOSPITAL | Age: 43
End: 2023-11-07
Payer: COMMERCIAL

## 2023-11-07 NOTE — PROGRESS NOTES
Non-compliant [facility] report chart review - Chart review completed for the following HM test if overdue  (Mammogram, Colonoscopy, Cervical Cancer Screening,  Diabetic lab testing, and/or Dilated EYE EXAM)  11/07/2023    Care Everywhere and Media reports - updates requested and reviewed.          Health Maintenance Due   Topic Date Due    Hepatitis C Screening  Never done    HIV Screening  Never done    Low Dose Statin  Never done    Foot Exam  07/10/2020    Lipid Panel  01/02/2021    Pneumococcal Vaccines (Age 0-64) (2 - PCV) 01/13/2021    Eye Exam  05/05/2022    Influenza Vaccine (1) 09/01/2023    COVID-19 Vaccine (3 - 2023-24 season) 09/01/2023

## 2023-11-14 NOTE — PHARMACY MED REC
"[facility]           Pharmacy  Admission Medication Reconciliation     The home medication history was taken by Anson Santo PharmD.      Medication history obtained from Medications listed below were obtained from: Patient/family    Based on information gathered for medication list, you may go to "Admission" then "Reconcile Home Medications" tabs to review and/or act upon those items.      The home medication list has been updated by the Pharmacy department.    Please read ALL comments highlighted in yellow.    Please address this information as you see fit.     Feel free to contact us if you have any questions or require assistance.      No current facility-administered medications on file prior to encounter.     Current Outpatient Medications on File Prior to Encounter   Medication Sig Dispense Refill    HUMALOG U-100 INSULIN 100 unit/mL injection USE WITH INSULIN PUMP. MAX DAILY DOSE 150 UNITS DAILY 120 mL 3    INSULIN PUMP CARTRIDGE SUBQ Inject into the skin.      losartan (COZAAR) 50 MG tablet Take 1 tablet (50 mg total) by mouth once daily. 90 tablet 3    sulfamethoxazole-trimethoprim 800-160mg (BACTRIM DS) 800-160 mg Tab Take 1 tablet by mouth 2 (two) times daily. for 7 days 14 tablet 0    NOVOLOG U-100 INSULIN ASPART 100 unit/mL injection USE WITH INSULIN PUMP. MAX DAILY DOSE 140 UNITS/ mL 3    tadalafiL (CIALIS) 10 MG tablet TAKE 1 TABLET (10 MG TOTAL) BY MOUTH DAILY AS NEEDED FOR ERECTILE DYSFUNCTION. 30 tablet 5    [DISCONTINUED] insulin lispro 100 unit/mL injection Use with insulin pump. Max daily dose 150 units/day 120 mL 3       Please address this information as you see fit.  Feel free to contact us if you have any questions or require assistance.    Anson Santo, LoveD  [phone]                  .          " needed assist

## 2023-11-29 ENCOUNTER — PATIENT OUTREACH (OUTPATIENT)
Dept: ADMINISTRATIVE | Facility: HOSPITAL | Age: 43
End: 2023-11-29
Payer: COMMERCIAL

## 2023-12-01 ENCOUNTER — PATIENT OUTREACH (OUTPATIENT)
Dept: ADMINISTRATIVE | Facility: HOSPITAL | Age: 43
End: 2023-12-01
Payer: COMMERCIAL

## 2023-12-28 ENCOUNTER — CLINICAL SUPPORT (OUTPATIENT)
Dept: OTHER | Facility: CLINIC | Age: 43
End: 2023-12-28
Payer: COMMERCIAL

## 2023-12-28 DIAGNOSIS — Z00.8 ENCOUNTER FOR OTHER GENERAL EXAMINATION: ICD-10-CM

## 2023-12-28 PROCEDURE — 82947 ASSAY GLUCOSE BLOOD QUANT: CPT | Mod: QW,S$GLB,, | Performed by: INTERNAL MEDICINE

## 2023-12-28 PROCEDURE — 80061 LIPID PANEL: CPT | Mod: QW,S$GLB,, | Performed by: INTERNAL MEDICINE

## 2023-12-28 PROCEDURE — 99401 PREV MED CNSL INDIV APPRX 15: CPT | Mod: S$GLB,,, | Performed by: INTERNAL MEDICINE

## 2023-12-29 VITALS
HEIGHT: 72 IN | SYSTOLIC BLOOD PRESSURE: 130 MMHG | BODY MASS INDEX: 42.66 KG/M2 | WEIGHT: 315 LBS | DIASTOLIC BLOOD PRESSURE: 78 MMHG

## 2023-12-29 LAB
GLUCOSE SERPL-MCNC: 84 MG/DL (ref 60–140)
HDLC SERPL-MCNC: 40 MG/DL
POC CHOLESTEROL, LDL (DOCK): 130 MG/DL
POC CHOLESTEROL, TOTAL: 180 MG/DL
TRIGL SERPL-MCNC: 52 MG/DL

## 2024-02-06 ENCOUNTER — PATIENT OUTREACH (OUTPATIENT)
Dept: ADMINISTRATIVE | Facility: HOSPITAL | Age: 44
End: 2024-02-06
Payer: COMMERCIAL

## 2024-02-06 VITALS — DIASTOLIC BLOOD PRESSURE: 78 MMHG | SYSTOLIC BLOOD PRESSURE: 130 MMHG

## 2024-02-06 NOTE — PROGRESS NOTES
Population Health Chart Review & Patient Outreach Details    Outreach Performed: YES Telephone Successful    Additional Pop Health Notes:           Updates Requested / Reviewed:      Updated Care Coordination Note, Care Everywhere, , Care Team Updated, Removed  or Duplicate Orders, and Immunizations Reconciliation Completed or Queried: [place]         Health Maintenance Topics Overdue:    Health Maintenance Due   Topic Date Due    Hepatitis C Screening  Never done    HIV Screening  Never done    Low Dose Statin  Never done    Foot Exam  07/10/2020    Pneumococcal Vaccines (Age 0-64) (2 of 2 - PCV) 2021    Eye Exam  2022    Influenza Vaccine (1) 2023    COVID-19 Vaccine (3 - -24 season) 2023         Health Maintenance Topic(s) Outreach Outcomes & Actions Taken:    Primary Care Appt - Outreach Outcomes & Actions Taken  : Patient Declined Scheduling or Will Call Back to Schedule

## 2024-03-27 DIAGNOSIS — E11.9 TYPE 2 DIABETES MELLITUS WITHOUT COMPLICATION, UNSPECIFIED WHETHER LONG TERM INSULIN USE: ICD-10-CM

## 2024-04-09 NOTE — PLAN OF CARE
Addended by: JACEY QUINTANA on: 4/9/2024 01:21 PM     Modules accepted: Orders     Discharge instructions reviewed at this time, pt denied questions. PIV removed with catheter intact. Drinking water

## 2024-05-20 ENCOUNTER — CLINICAL SUPPORT (OUTPATIENT)
Dept: OTHER | Facility: CLINIC | Age: 44
End: 2024-05-20
Payer: COMMERCIAL

## 2024-05-20 DIAGNOSIS — Z00.8 ENCOUNTER FOR OTHER GENERAL EXAMINATION: ICD-10-CM

## 2024-05-20 PROCEDURE — 99401 PREV MED CNSL INDIV APPRX 15: CPT | Mod: S$GLB,,, | Performed by: INTERNAL MEDICINE

## 2024-05-20 PROCEDURE — 82947 ASSAY GLUCOSE BLOOD QUANT: CPT | Mod: QW,S$GLB,, | Performed by: INTERNAL MEDICINE

## 2024-05-20 PROCEDURE — 83036 HEMOGLOBIN GLYCOSYLATED A1C: CPT | Mod: QW,S$GLB,, | Performed by: INTERNAL MEDICINE

## 2024-05-20 PROCEDURE — 80061 LIPID PANEL: CPT | Mod: QW,S$GLB,, | Performed by: INTERNAL MEDICINE

## 2024-05-21 VITALS
HEIGHT: 71 IN | SYSTOLIC BLOOD PRESSURE: 128 MMHG | WEIGHT: 315 LBS | BODY MASS INDEX: 44.1 KG/M2 | DIASTOLIC BLOOD PRESSURE: 81 MMHG

## 2024-05-21 LAB
HBA1C MFR BLD: 6.5 %
HDLC SERPL-MCNC: 34 MG/DL
POC CHOLESTEROL, TOTAL: 118 MG/DL
POC GLUCOSE, FASTING: 202 MG/DL (ref 60–110)
TRIGL SERPL-MCNC: 44 MG/DL

## 2024-06-03 ENCOUNTER — PATIENT OUTREACH (OUTPATIENT)
Dept: ADMINISTRATIVE | Facility: HOSPITAL | Age: 44
End: 2024-06-03
Payer: COMMERCIAL

## 2024-06-03 NOTE — PROGRESS NOTES
Population Health Chart Review & Patient Outreach Details      Additional [facility] Notes:               Updates Requested / Reviewed:      Updated Care Coordination Note, Care Everywhere, and Immunizations Reconciliation Completed or Queried: [place]         Health Maintenance Topics Overdue:      [facility] Score: 3     Urine Screening  Eye Exam  Foot Exam                       Health Maintenance Topic(s) Outreach Outcomes & Actions Taken:    Eye Exam - Outreach Outcomes & Actions Taken  : Pt Declined Scheduling Eye Exam    Lab(s) - Outreach Outcomes & Actions Taken  : Patient Declined Scheduling Labs or Will Call Back to Schedule

## 2024-06-27 ENCOUNTER — PATIENT MESSAGE (OUTPATIENT)
Dept: ADMINISTRATIVE | Facility: HOSPITAL | Age: 44
End: 2024-06-27
Payer: COMMERCIAL

## 2024-10-07 ENCOUNTER — OCCUPATIONAL HEALTH (OUTPATIENT)
Dept: URGENT CARE | Facility: CLINIC | Age: 44
End: 2024-10-07

## 2024-10-07 ENCOUNTER — OFFICE VISIT (OUTPATIENT)
Dept: URGENT CARE | Facility: CLINIC | Age: 44
End: 2024-10-07
Payer: COMMERCIAL

## 2024-10-07 VITALS
TEMPERATURE: 99 F | SYSTOLIC BLOOD PRESSURE: 122 MMHG | BODY MASS INDEX: 44.1 KG/M2 | HEART RATE: 86 BPM | HEIGHT: 71 IN | OXYGEN SATURATION: 97 % | WEIGHT: 315 LBS | RESPIRATION RATE: 18 BRPM | DIASTOLIC BLOOD PRESSURE: 79 MMHG

## 2024-10-07 DIAGNOSIS — Z02.6 ENCOUNTER RELATED TO WORKER'S COMPENSATION CLAIM: ICD-10-CM

## 2024-10-07 DIAGNOSIS — Z02.83 ENCOUNTER FOR DRUG SCREENING: Primary | ICD-10-CM

## 2024-10-07 DIAGNOSIS — Z04.1 MOTOR VEHICLE ACCIDENT WITH NO INJURY: Primary | ICD-10-CM

## 2024-10-07 LAB — BREATH ALCOHOL: 0

## 2024-10-07 PROCEDURE — 80305 DRUG TEST PRSMV DIR OPT OBS: CPT | Mod: S$GLB,,, | Performed by: STUDENT IN AN ORGANIZED HEALTH CARE EDUCATION/TRAINING PROGRAM

## 2024-10-07 PROCEDURE — 82075 ASSAY OF BREATH ETHANOL: CPT | Mod: S$GLB,,, | Performed by: STUDENT IN AN ORGANIZED HEALTH CARE EDUCATION/TRAINING PROGRAM

## 2024-10-07 PROCEDURE — 99213 OFFICE O/P EST LOW 20 MIN: CPT | Mod: S$GLB,,, | Performed by: STUDENT IN AN ORGANIZED HEALTH CARE EDUCATION/TRAINING PROGRAM
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
## 2025-06-02 ENCOUNTER — CLINICAL SUPPORT (OUTPATIENT)
Dept: OTHER | Facility: CLINIC | Age: 45
End: 2025-06-02
Payer: COMMERCIAL

## 2025-06-02 DIAGNOSIS — Z00.8 ENCOUNTER FOR OTHER GENERAL EXAMINATION: ICD-10-CM

## 2025-06-03 VITALS
SYSTOLIC BLOOD PRESSURE: 122 MMHG | DIASTOLIC BLOOD PRESSURE: 68 MMHG | HEIGHT: 71 IN | BODY MASS INDEX: 31.64 KG/M2 | WEIGHT: 226 LBS

## 2025-06-03 LAB
GLUCOSE SERPL-MCNC: 149 MG/DL (ref 60–140)
HBA1C MFR BLD: 5 %
HDLC SERPL-MCNC: 39 MG/DL
POC CHOLESTEROL, LDL (DOCK): 96 MG/DL
POC CHOLESTEROL, TOTAL: 146 MG/DL
TRIGL SERPL-MCNC: 50 MG/DL

## 2025-06-05 ENCOUNTER — RESULTS FOLLOW-UP (OUTPATIENT)
Dept: OTHER | Facility: CLINIC | Age: 45
End: 2025-06-05

## (undated) DEVICE — PACK BASIC

## (undated) DEVICE — PAD ABDOMINAL 5X9 STERILE

## (undated) DEVICE — TOWEL OR DISP STRL BLUE 4/PK

## (undated) DEVICE — SEE MEDLINE ITEM 154981

## (undated) DEVICE — GOWN POLY REINF BRTH SLV LG

## (undated) DEVICE — PANTIES FEMININE NAPKIN LG/XLG

## (undated) DEVICE — GLOVE SURGICAL LATEX SZ 7

## (undated) DEVICE — ELECTRODE REM PLYHSV RETURN 9

## (undated) DEVICE — GAUZE SPONGE 4X4 12PLY

## (undated) DEVICE — SPONGE GAUZE 16PLY 4X4

## (undated) DEVICE — COVER OVERHEAD SURG LT BLUE

## (undated) DEVICE — SYR 10CC LUER LOCK

## (undated) DEVICE — NDL HYPO REG 25G X 1 1/2

## (undated) DEVICE — PAD PREP CUFFED NS 24X48IN

## (undated) DEVICE — PACK SURGERY START